# Patient Record
Sex: MALE | Race: WHITE | NOT HISPANIC OR LATINO | Employment: OTHER | URBAN - METROPOLITAN AREA
[De-identification: names, ages, dates, MRNs, and addresses within clinical notes are randomized per-mention and may not be internally consistent; named-entity substitution may affect disease eponyms.]

---

## 2019-10-16 ENCOUNTER — OFFICE VISIT (OUTPATIENT)
Dept: NEUROLOGY | Facility: CLINIC | Age: 49
End: 2019-10-16

## 2019-10-16 VITALS
DIASTOLIC BLOOD PRESSURE: 80 MMHG | SYSTOLIC BLOOD PRESSURE: 110 MMHG | HEIGHT: 64 IN | WEIGHT: 145 LBS | BODY MASS INDEX: 24.75 KG/M2 | OXYGEN SATURATION: 100 % | HEART RATE: 84 BPM

## 2019-10-16 DIAGNOSIS — Z46.2 ENCOUNTER FOR INTERROGATION OF NEUROSTIMULATOR: ICD-10-CM

## 2019-10-16 DIAGNOSIS — G40.119 PARTIAL SYMPTOMATIC EPILEPSY WITH SIMPLE PARTIAL SEIZURES, INTRACTABLE, WITHOUT STATUS EPILEPTICUS (HCC): Primary | ICD-10-CM

## 2019-10-16 PROBLEM — G40.909 EPILEPSY: Status: ACTIVE | Noted: 2019-10-16

## 2019-10-16 PROCEDURE — 95977 ALYS CPLX CN NPGT PRGRMG: CPT | Performed by: PSYCHIATRY & NEUROLOGY

## 2019-10-16 PROCEDURE — 99204 OFFICE O/P NEW MOD 45 MIN: CPT | Performed by: PSYCHIATRY & NEUROLOGY

## 2019-10-16 RX ORDER — MONTELUKAST SODIUM 10 MG/1
10 TABLET ORAL NIGHTLY
COMMUNITY
End: 2020-06-22 | Stop reason: SDUPTHER

## 2019-10-16 RX ORDER — LEVOCETIRIZINE DIHYDROCHLORIDE 5 MG/1
5 TABLET, FILM COATED ORAL EVERY EVENING
COMMUNITY
End: 2020-02-20 | Stop reason: SDUPTHER

## 2019-10-16 RX ORDER — ZONISAMIDE 100 MG/1
100 CAPSULE ORAL DAILY
COMMUNITY
End: 2020-05-05 | Stop reason: SDUPTHER

## 2019-10-16 RX ORDER — LAMOTRIGINE 100 MG/1
100 TABLET ORAL DAILY
COMMUNITY
End: 2020-03-19 | Stop reason: DRUGHIGH

## 2019-10-16 RX ORDER — LORAZEPAM 1 MG/1
1 TABLET ORAL EVERY 8 HOURS PRN
COMMUNITY
End: 2020-02-20 | Stop reason: SDUPTHER

## 2019-10-16 RX ORDER — PHENYTOIN SODIUM 100 MG/1
CAPSULE, EXTENDED RELEASE ORAL 2 TIMES DAILY
COMMUNITY
End: 2019-11-08

## 2019-10-16 NOTE — PROGRESS NOTES
Subjective:     Patient ID: Sreedhar Raines is a 49 y.o. male.    Kalpesh is a 49-year-old male with a history of allergies, developmental delay, epilepsy, and cerebral palsy who presents to the neurology clinic today as a new patient for the evaluation of seizures.  The patient has a vagus nerve stimulator.  I reviewed the patient's records.  I reviewed a neurology note from February 7, 2018.  He reports the patient has a history of hemiplegia.  His seizures went from 3/week to 1 every 3 to 4 weeks.  This was improved with zonisamide.  He is also on name brand only Dilantin and Lamictal.  He had an EEG done in 2017 that showed left disorganized cortical activity.  He has allergies to Depakote and Tegretol.  Is in this in mind was increased at the last visit.  The patient was seen by Dr. Morfin.  There is another note from May 2018.  There was not much change in his seizure frequency with the increase in the sonogram.  The plan was to refer the patient for a VNS.  He was last seen in March 2019.  No changes to his medications were made.  I reviewed the patient's labs.  On February 7, 2018 his Dilantin level was 22.2 I reviewed a report from an MRI of the brain from 2017.  It showed severe volume loss throughout the entire left cerebral hemisphere with associated ex vacuo dilatation Tatian of the atrium and occipital horn of the left lateral ventricle.  He was stable from 2013.  Cortical loss and gliosis superiorly within the right frontal lobe consistent with nonspecific insult.  Increased signal within the left mesial temporal region raising suspicion of mesial temporal sclerosis.    The patient is accompanied by his mother today who is also his legal guardian.  She reports that her delivery was complicated.  Her  was in the  and they were in [place] at the time.  She carried the patient for 10 months.  She had prolonged labor and apparently he got stuck and had a traumatic forceps removal.  He had his  first seizure 6 hours after delivery and was in a coma.  He is essentially in the NICU for about 6 days.  He was initially on phenobarbital.  At 14 days he was then flown back to the [place].  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  At the age of 7 he had status epilepticus and Dilantin was added.  His VNS was placed last year and has been very helpful.  He was seizure-free for 3 months and this is the longest he is ever been seizure-free.  His last seizure was a week ago.  The magnet swipes generally abort the seizure.  His last VNS change was in April.  He last got blood work in April.  He generally has seizures between 3 and 8 PM.  He does have some voice change with the VNS.  He is on name brand only Dilantin 300 mg at night.  He had more seizures with the generic.  He has got toxic on this in the past.  He has been on this medication since he was 7.  He is on lamotrigine 200 mg twice a day for the past 3 years and zonisamide 100 mg twice a day was added a year and half ago.  They deny any side effects to these medications.  He was taking over-the-counter CBD oil but not since the VNS was planted.  He also uses Ativan as needed for agitation.  They moved from [place] to [place].  He had hyperammonemia to Depakote, mouth blisters with carbamazepine, and continued seizures with levetiracetam and topiramate.  He usually feels his seizures coming on.  He will have focal motor seizures with right arm shaking lasting about 5 to 10 seconds.  The last one was about 90 seconds.  Sometimes he has difficulty breathing.  This may progress into a generalized tonic-clonic seizures.  They can be triggered by flashing lights.  3 times in his life he is had seizure clusters but the last time was 11 years ago.  Afterwards it takes him a while to perk up.  He apparently had a DEXA scan done 2 years ago that was normal but previously it showed osteoporosis and he was treated with Reclast.  He is also  on calcium with vitamin D.  He last had blood work done in April for drug monitoring.      The following portions of the patient's history were reviewed and updated as appropriate: allergies, current medications, past family history, past medical history, past social history, past surgical history and problem list.    Review of Systems   Eyes: Positive for photophobia.   Endocrine: Positive for heat intolerance.   Musculoskeletal: Positive for gait problem.   Allergic/Immunologic: Positive for environmental allergies.   Neurological: Positive for seizures, speech difficulty and weakness. Negative for dizziness, tremors, syncope, facial asymmetry, light-headedness, numbness and headaches.   Hematological: Does not bruise/bleed easily.   Psychiatric/Behavioral: Positive for agitation and behavioral problems. Negative for confusion, decreased concentration, dysphoric mood, hallucinations, self-injury, sleep disturbance and suicidal ideas. The patient is not nervous/anxious and is not hyperactive.     I reviewed the ROS documented by the MA.  All other systems negative.      Objective:    Neurologic Exam    Physical Exam   Constitutional:  Vital signs reviewed.  No apparent distress.  Well groomed.  Eyes:  No injection, no icterus.    Respiratory:  Normal effort.  Clear to auscultation bilaterally.  Cardiovascular:  Regular rate and rhythm.  No murmurs.  No carotid bruits. Symmetric radial pulses.  Musculoskeletal: Normal station.  Gait steady.  The patient has increased tone in his right upper extremity as well as decreased bulk.  Tone and bulk is normal in the left upper extremity and bilateral lower extremities.  There is a mild right hemiparesis in the right upper extremity more than the right lower extremity.  Skin:  No rashes.  Warm, dry, and intact.  Psychiatric:  Good mood.  Normal affect.    Neurologic:  Mental status-  The patient is alert and oriented to person only. Attention/concentration is decreased.   Speech is fluent without dysarthria.    Cranial nerves- Pupils equally round and reactive to light with intact accomodation.   Extraocular movements intact.  Facial sensation intact.  Smile symmetric.  Hearing intact to finger-rub bilaterally.  Palate elevates symmetrically.  SCM and trapezius are 5/5 bilaterally.  Tongue is midline.  Motor-  See musculoskeletal above.  No tremor.  Reflexes-reflexes are brisker in the right upper extremity compared to the left.  2+ in the bilateral lower extremities proximally and distally.  Sensation- Intact to light touch in the bilateral upper extremities.  Coordination- Intact to finger to nose and heel knee shin bilaterally.   Gait- See musculoskeletal exam above.     The patient's VNS was interrogated today.  Programming changes were made.  A total of 5 changes were made.  Below are his current settings.  He tolerated the procedure without any adverse side effects.          Assessment/Plan:  Kalpesh is a 49-year-old male with a history of allergies, developmental delay, cerebral palsy, and intractable epilepsy status post VNS who presents to the neurology clinic today as a new patient for evaluation.    1.  Intractable epilepsy-The patient likely has structural epilepsy with focal aware seizures with motor onset (clonic movements of the right upper extremity).  Sometimes these progressed to bilateral tonic-clonic seizures.  He has gotten a lot of benefit from his VNS.  I increased his duty cycle both during the daytime as well as during his nighttime settings.  I also lowered the auto stim threshold.  In the future, we can consider increasing the frequency and pulse width however this may have more impact on his voice.  I will try to request the records from his most recent blood work from drug monitoring.  I will continue his current medications with no changes.  In the future we may consider weaning off the phenytoin.  If that is done we may need to increase the zonisamide or  may be replacing phenytoin with Vimpat.     Problems Addressed this Visit        Nervous and Auditory    Epilepsy (CMS/HCC) - Primary    Relevant Medications    phenytoin (DILANTIN) 100 MG ER capsule    zonisamide (ZONEGRAN) 100 MG capsule    lamoTRIgine (LaMICtal) 100 MG tablet       Other    Encounter for interrogation of neurostimulator

## 2019-10-16 NOTE — PATIENT INSTRUCTIONS
[facility]  Payton Harrington MD  Neurology clinic  [phone]    With anti-seizure medications, you may initially notice side effects of fatigue, drowsiness, unsteadiness, and dizziness.  Other possible side effects include nausea, abdominal pain, headache, blurry or double vision, slurred speech and mood changes.  Generally, patients will noticed these symptoms when the medication is first started or with higher doses and will go away with time.    It is import to consistently take your medication every day.  Missing just one dose may put you at risk for a breakthrough seizure.  Consider using reminders on your phone or a pill box.    If you develop a rash, please call the neurology clinic immediately or notify another healthcare professional, as this may be potentially life-threatening.  If you are unable to reach a healthcare professional, go to the emergency room immediately for further evaluation.    If you develop thoughts of wanting to hurt yourself or others, please call the neurology clinic immediately to notify another healthcare professional.  If you are unable to reach a healthcare professional, go to the emergency room immediately for further evaluation.    Taking anti-seizure medications may increase the risk of birth defects.  If you are a female of child-bearing potential, it is recommended that you take folic acid (1-4 mg) daily.  This may reduce the risk of birth defects while pregnant and taking seizure medication.  If you become pregnant, contact our office immediately. You will need to be followed very closely (at least monthly appointments).  I also recommend contacting The North American Antiepileptic Drug Pregnancy Registry at www.aedpregnancyregistry.org or 1-148.549.8592.    It is the [place] state law that you cannot drive within 90 days of a seizure.    You should avoid certain activities that if you were to have a seizure, you could harm yourself or others. In  general, it is recommended that you avoid operating heavy machinery or power tools, swimming or taking baths by yourself (showers are ok), don't stand over open flames, don't get on high ladders or the roof.  I also recommend to avoid sleeping on your stomach.    For further information on epilepsy and resources available to patients and their families, please visit the [facility] at www.efky.org or call [phone].

## 2019-11-01 ENCOUNTER — TELEPHONE (OUTPATIENT)
Dept: NEUROLOGY | Facility: CLINIC | Age: 49
End: 2019-11-01

## 2019-11-01 NOTE — TELEPHONE ENCOUNTER
----- Message from Payton Harrington MD sent at 10/16/2019  5:55 PM EDT -----  Regarding: Labs  This patient reports that he had blood work done in April of last year by the referring provider.  Can we try to get those results?  Thanks!

## 2019-11-08 ENCOUNTER — TELEPHONE (OUTPATIENT)
Dept: NEUROLOGY | Facility: CLINIC | Age: 49
End: 2019-11-08

## 2019-11-08 RX ORDER — PHENYTOIN SODIUM 100 MG/1
100 CAPSULE, EXTENDED RELEASE ORAL 2 TIMES DAILY
Qty: 90 CAPSULE | Refills: 11 | OUTPATIENT
Start: 2019-11-08

## 2019-11-08 NOTE — TELEPHONE ENCOUNTER
Please call this medication in.  I want us to make sure that we refill the correct formulation of the drug.  I'm not sure if he is on ER.

## 2019-11-08 NOTE — TELEPHONE ENCOUNTER
----- Message from Viviana Washington sent at 11/7/2019 12:14 PM EST -----  Contact: Liudmila  Mother called requesting a refill of patients Dialantin. Patient uses [facility] in [place].  [phone] is the Store number, mom did not have the pharmacy direct number. PLEASE ADVISE PATIENT HAS ONLY ENOUGH UNTIL Saturday EVENING MOM ASKS WE CALL IN THE SCRIPT ASAP AS HE WILL HAVE NOTHING FOR Sunday. Thank you.

## 2019-11-08 NOTE — TELEPHONE ENCOUNTER
Patient is on name brand only (medically necessary) Dilantin 100 mg, sig: 3 tabs a day, #90, refills 11.  Can you call in?

## 2019-12-30 ENCOUNTER — TELEPHONE (OUTPATIENT)
Dept: NEUROLOGY | Facility: CLINIC | Age: 49
End: 2019-12-30

## 2019-12-30 ENCOUNTER — APPOINTMENT (OUTPATIENT)
Dept: LAB | Facility: HOSPITAL | Age: 49
End: 2019-12-30

## 2019-12-30 DIAGNOSIS — R56.9 GENERALIZED CONVULSIVE SEIZURES (HCC): Primary | ICD-10-CM

## 2019-12-30 PROCEDURE — 80186 ASSAY OF PHENYTOIN FREE: CPT | Performed by: PSYCHIATRY & NEUROLOGY

## 2019-12-30 PROCEDURE — 80185 ASSAY OF PHENYTOIN TOTAL: CPT | Performed by: PSYCHIATRY & NEUROLOGY

## 2019-12-30 PROCEDURE — 36415 COLL VENOUS BLD VENIPUNCTURE: CPT

## 2019-12-30 NOTE — TELEPHONE ENCOUNTER
Liudmila will take him to the lab to get his Dilantin Level checked. I informed her that the best way to do this is an hour before his dose. She stated understanding.

## 2019-12-30 NOTE — TELEPHONE ENCOUNTER
Liudmila (mom) was calling because around 11 the patient balance seemed to be off. She is wondering if it can be from his Dilantin. Please advise.

## 2019-12-30 NOTE — TELEPHONE ENCOUNTER
If she thinks that he is a fall risk, I recommend going to the ER.  We can check a Dilantin level.  The best way to do this is to get it drawn an hour before the dose.  I can place the orders in and they can have it done wherever is convenient.

## 2020-01-02 ENCOUNTER — TELEPHONE (OUTPATIENT)
Dept: NEUROLOGY | Facility: CLINIC | Age: 50
End: 2020-01-02

## 2020-01-02 LAB
PHENYTOIN FREE SERPL-MCNC: 1.6 UG/ML (ref 1–2)
PHENYTOIN SERPL-MCNC: 27.7 UG/ML (ref 10–20)

## 2020-01-02 NOTE — TELEPHONE ENCOUNTER
Can you let this patient's mom know that we got the results from his Dilantin level and it looks okay?  I want to make sure that he got the level drawn an hour before the dose.  Can we also confirm with them what he takes exactly?  I would be hesitant to make any changes to his dosing if he has been on a stable dosing for a long time.  I am not sure why his medication would all of a sudden be causing side effects.

## 2020-01-02 NOTE — TELEPHONE ENCOUNTER
Informed patients mom of lab results and she stated understanding. She stated that he did have the labs drown an hour before the dose. He is currently on 100 mg tablets, he takes 3 tablets at 5:00 PM Daily.  She did state that he is feeling better, still just a little wobbly.

## 2020-01-16 ENCOUNTER — OFFICE VISIT (OUTPATIENT)
Dept: NEUROLOGY | Facility: CLINIC | Age: 50
End: 2020-01-16

## 2020-01-16 VITALS
HEART RATE: 83 BPM | DIASTOLIC BLOOD PRESSURE: 70 MMHG | SYSTOLIC BLOOD PRESSURE: 110 MMHG | BODY MASS INDEX: 22.88 KG/M2 | WEIGHT: 134 LBS | HEIGHT: 64 IN | OXYGEN SATURATION: 98 %

## 2020-01-16 DIAGNOSIS — G40.119 PARTIAL SYMPTOMATIC EPILEPSY WITH SIMPLE PARTIAL SEIZURES, INTRACTABLE, WITHOUT STATUS EPILEPTICUS (HCC): Primary | ICD-10-CM

## 2020-01-16 DIAGNOSIS — Z46.2 ENCOUNTER FOR INTERROGATION OF NEUROSTIMULATOR: ICD-10-CM

## 2020-01-16 PROCEDURE — 95970 ALYS NPGT W/O PRGRMG: CPT | Performed by: PSYCHIATRY & NEUROLOGY

## 2020-01-16 PROCEDURE — 99214 OFFICE O/P EST MOD 30 MIN: CPT | Performed by: PSYCHIATRY & NEUROLOGY

## 2020-01-16 RX ORDER — PHENYTOIN SODIUM 100 MG/1
200 CAPSULE, EXTENDED RELEASE ORAL DAILY
COMMUNITY
Start: 2020-01-06 | End: 2020-11-30

## 2020-01-16 RX ORDER — PHENYTOIN 50 MG/1
50 TABLET, CHEWABLE ORAL DAILY
Qty: 30 TABLET | Refills: 11 | Status: SHIPPED | OUTPATIENT
Start: 2020-01-16 | End: 2020-02-21

## 2020-01-16 NOTE — PROGRESS NOTES
Subjective:     Patient ID: Sreedhar Raines is a 49 y.o. male.    Kalpesh is a 49-year-old male with a history of allergies, developmental delay, epilepsy, and cerebral palsy who presents to the neurology clinic today as an established patient for the evaluation of seizures.  The patient was last seen as a new patient on December 16, 2019.  The patient is accompanied by his mother which is his legal guardian.  They live together in [place].  She had a complicated delivery where she carried the patient for 10 months.  Labor was prolonged and he had a traumatic removal with forceps.  His first seizure was 6 hours after delivery and he was in a coma.  He was in the NICU for about a week.  Initially was on phenobarbital.  He would have eye twitching seizures when he was younger and then at age 7 he had status epilepticus.  After VNS was placed he went seizure-free for 3 months which is the longest he had ever been seizure-free.  The magnet swipes generally abort the seizure.  He generally has seizures between 3 and 8 PM.  He has had some hoarseness with the VNS.  He takes name brand only Dilantin 300 mg at 5 PM.  He also takes lamotrigine 200 mg twice a day and zonisamide 100 mg twice a day.  He also uses Ativan as needed for agitation.  Mom uses it once or twice a day.  Rarely 3 times.  He had hyperammonemia to Depakote in the past, mouth blisters with carbamazepine and he continued to have seizures with Keppra and Topamax.  He will have focal motor seizures involving his right arm lasting 5 to 10 seconds.  These may progress to generalized tonic-clonic seizures.  They may be triggered by flashing lights.  3 times in his life he had seizure clusters but this has not occurred in 11 years.  He had a DEXA scan 2 years ago that was okay but he previously had one that showed osteoporosis and he was treated.  Since our last visit the patient has had some intermittent unsteadiness.  It started 2 weeks ago.  The last time was  Tuesday.  He will look like he is weak in the knees and have to hold onto the walls.  The first time it happened it lasted all day but since then has been intermittent lasting just moments.  It is occurred 4 times.  It is generally upon standing.  Since his last visit he felt one seizure, on but it aborted prior to the magnet swipe.  We checked a Dilantin level on December 30 his total level was 27.7 his free level is 1.6.  He had a level done on February 7, 2018 that was 22.2.    The following portions of the patient's history were reviewed and updated as appropriate: allergies, current medications, past family history, past medical history, past social history, past surgical history and problem list.    Review of Systems     Objective:    Neurologic Exam    Physical Exam   The patient's VNS was interrogated.  No changes were made.  He tolerated the procedure well without any adverse side effects.  Below are his current settings.        Assessment/Plan:  Kalpesh is a 41-year-old male with history of allergies, developmental delay, intractable epilepsy status post VNS and cerebral palsy who presents to the neurology clinic today for follow-up.    1.  Intractable epilepsy- fortunately he had one small seizure since his last visit that aborted before he could even swiped his magnet.  Due to these episodes of unsteadiness is possibly due to Dilantin toxicity.  I do recommend decreasing his dose to 250 mg daily.  We will continue lamotrigine as an SMI.  I do not recommend changing his VNS at this time.  A total of 25 minutes of face-to-face time was spent with the patient and his mother and greater than 50% that time was spent on counseling regarding his symptoms and medication management.  If he does have any further seizures, we can repeat a level at that time.  I am happy to continue to prescribe Ativan as needed for agitation.  He has been on stable doses for 8 years and it seems to work well.  If it stops working  well for him then we may consider referral to psychiatry.     Problems Addressed this Visit        Nervous and Auditory    Epilepsy (CMS/HCC) - Primary    Relevant Medications    DILANTIN 100 MG ER capsule    phenytoin (DILANTIN INFATABS) 50 MG chewable tablet       Other    Encounter for interrogation of neurostimulator

## 2020-02-20 ENCOUNTER — OFFICE VISIT (OUTPATIENT)
Dept: FAMILY MEDICINE CLINIC | Facility: CLINIC | Age: 50
End: 2020-02-20

## 2020-02-20 VITALS
SYSTOLIC BLOOD PRESSURE: 116 MMHG | TEMPERATURE: 98.8 F | WEIGHT: 137.4 LBS | OXYGEN SATURATION: 99 % | HEIGHT: 65 IN | HEART RATE: 83 BPM | BODY MASS INDEX: 22.89 KG/M2 | DIASTOLIC BLOOD PRESSURE: 75 MMHG

## 2020-02-20 DIAGNOSIS — G40.119 PARTIAL SYMPTOMATIC EPILEPSY WITH SIMPLE PARTIAL SEIZURES, INTRACTABLE, WITHOUT STATUS EPILEPTICUS (HCC): ICD-10-CM

## 2020-02-20 DIAGNOSIS — J30.89 ENVIRONMENTAL AND SEASONAL ALLERGIES: ICD-10-CM

## 2020-02-20 DIAGNOSIS — R45.1 AGITATION: ICD-10-CM

## 2020-02-20 DIAGNOSIS — F39 MOOD DISORDER (HCC): ICD-10-CM

## 2020-02-20 DIAGNOSIS — G80.2 SPASTIC HEMIPLEGIC CEREBRAL PALSY (HCC): Primary | Chronic | ICD-10-CM

## 2020-02-20 PROCEDURE — 99204 OFFICE O/P NEW MOD 45 MIN: CPT | Performed by: FAMILY MEDICINE

## 2020-02-20 RX ORDER — LEVOCETIRIZINE DIHYDROCHLORIDE 5 MG/1
5 TABLET, FILM COATED ORAL EVERY EVENING
Qty: 30 TABLET | Refills: 5 | Status: SHIPPED | OUTPATIENT
Start: 2020-02-20 | End: 2020-02-21

## 2020-02-20 RX ORDER — LORAZEPAM 1 MG/1
1 TABLET ORAL EVERY 8 HOURS PRN
Qty: 86 TABLET | Refills: 0 | Status: SHIPPED | OUTPATIENT
Start: 2020-02-20 | End: 2020-07-21

## 2020-02-20 RX ORDER — RISPERIDONE 0.5 MG/1
0.5 TABLET ORAL NIGHTLY
Qty: 30 TABLET | Refills: 1 | Status: SHIPPED | OUTPATIENT
Start: 2020-02-20 | End: 2020-04-13

## 2020-02-21 RX ORDER — LEVOCETIRIZINE DIHYDROCHLORIDE 5 MG/1
5 TABLET, FILM COATED ORAL EVERY EVENING
Qty: 90 TABLET | Refills: 1 | Status: SHIPPED | OUTPATIENT
Start: 2020-02-21 | End: 2020-08-31

## 2020-02-21 RX ORDER — PHENYTOIN 50 MG/1
TABLET, CHEWABLE ORAL
Qty: 90 TABLET | Refills: 10 | Status: SHIPPED | OUTPATIENT
Start: 2020-02-21 | End: 2021-01-13

## 2020-03-16 ENCOUNTER — TELEPHONE (OUTPATIENT)
Dept: NEUROLOGY | Facility: CLINIC | Age: 50
End: 2020-03-16

## 2020-03-16 RX ORDER — LAMOTRIGINE 100 MG/1
TABLET ORAL
Qty: 450 TABLET | Refills: 3 | Status: SHIPPED | OUTPATIENT
Start: 2020-03-16 | End: 2021-02-24 | Stop reason: SDUPTHER

## 2020-03-19 ENCOUNTER — OFFICE VISIT (OUTPATIENT)
Dept: FAMILY MEDICINE CLINIC | Facility: CLINIC | Age: 50
End: 2020-03-19

## 2020-03-19 VITALS
BODY MASS INDEX: 22.16 KG/M2 | HEART RATE: 73 BPM | DIASTOLIC BLOOD PRESSURE: 74 MMHG | HEIGHT: 65 IN | SYSTOLIC BLOOD PRESSURE: 115 MMHG | OXYGEN SATURATION: 99 % | TEMPERATURE: 98.7 F | WEIGHT: 133 LBS

## 2020-03-19 DIAGNOSIS — Z11.59 NEED FOR HEPATITIS C SCREENING TEST: ICD-10-CM

## 2020-03-19 DIAGNOSIS — R63.4 UNINTENTIONAL WEIGHT LOSS: ICD-10-CM

## 2020-03-19 DIAGNOSIS — G80.2 SPASTIC HEMIPLEGIC CEREBRAL PALSY (HCC): Chronic | ICD-10-CM

## 2020-03-19 DIAGNOSIS — G40.119 PARTIAL SYMPTOMATIC EPILEPSY WITH SIMPLE PARTIAL SEIZURES, INTRACTABLE, WITHOUT STATUS EPILEPTICUS (HCC): Chronic | ICD-10-CM

## 2020-03-19 DIAGNOSIS — F39 MOOD DISORDER (HCC): Primary | ICD-10-CM

## 2020-03-19 DIAGNOSIS — H61.21 IMPACTED CERUMEN OF RIGHT EAR: ICD-10-CM

## 2020-03-19 PROCEDURE — 69210 REMOVE IMPACTED EAR WAX UNI: CPT | Performed by: FAMILY MEDICINE

## 2020-03-19 PROCEDURE — 99214 OFFICE O/P EST MOD 30 MIN: CPT | Performed by: FAMILY MEDICINE

## 2020-03-24 LAB
ALBUMIN SERPL-MCNC: 4.4 G/DL (ref 3.5–5.2)
ALBUMIN/GLOB SERPL: 1.8 G/DL
ALP SERPL-CCNC: 69 U/L (ref 39–117)
ALT SERPL W P-5'-P-CCNC: 13 U/L (ref 1–41)
ANION GAP SERPL CALCULATED.3IONS-SCNC: 11.3 MMOL/L (ref 5–15)
AST SERPL-CCNC: 11 U/L (ref 1–40)
BASOPHILS # BLD AUTO: 0.02 10*3/MM3 (ref 0–0.2)
BASOPHILS NFR BLD AUTO: 0.4 % (ref 0–1.5)
BILIRUB SERPL-MCNC: <0.2 MG/DL (ref 0.2–1.2)
BUN BLD-MCNC: 11 MG/DL (ref 6–20)
BUN/CREAT SERPL: 11 (ref 7–25)
CALCIUM SPEC-SCNC: 9.3 MG/DL (ref 8.6–10.5)
CHLORIDE SERPL-SCNC: 104 MMOL/L (ref 98–107)
CO2 SERPL-SCNC: 24.7 MMOL/L (ref 22–29)
CREAT BLD-MCNC: 1 MG/DL (ref 0.76–1.27)
DEPRECATED RDW RBC AUTO: 40.3 FL (ref 37–54)
EOSINOPHIL # BLD AUTO: 0.15 10*3/MM3 (ref 0–0.4)
EOSINOPHIL NFR BLD AUTO: 3.4 % (ref 0.3–6.2)
ERYTHROCYTE [DISTWIDTH] IN BLOOD BY AUTOMATED COUNT: 12.1 % (ref 12.3–15.4)
GFR SERPL CREATININE-BSD FRML MDRD: 80 ML/MIN/1.73
GLOBULIN UR ELPH-MCNC: 2.5 GM/DL
GLUCOSE BLD-MCNC: 83 MG/DL (ref 65–99)
HCT VFR BLD AUTO: 38.2 % (ref 37.5–51)
HCV AB SER DONR QL: NORMAL
HGB BLD-MCNC: 13.3 G/DL (ref 13–17.7)
IMM GRANULOCYTES # BLD AUTO: 0 10*3/MM3 (ref 0–0.05)
IMM GRANULOCYTES NFR BLD AUTO: 0 % (ref 0–0.5)
LYMPHOCYTES # BLD AUTO: 2.15 10*3/MM3 (ref 0.7–3.1)
LYMPHOCYTES NFR BLD AUTO: 48.2 % (ref 19.6–45.3)
MCH RBC QN AUTO: 32 PG (ref 26.6–33)
MCHC RBC AUTO-ENTMCNC: 34.8 G/DL (ref 31.5–35.7)
MCV RBC AUTO: 92 FL (ref 79–97)
MONOCYTES # BLD AUTO: 0.52 10*3/MM3 (ref 0.1–0.9)
MONOCYTES NFR BLD AUTO: 11.7 % (ref 5–12)
NEUTROPHILS # BLD AUTO: 1.62 10*3/MM3 (ref 1.7–7)
NEUTROPHILS NFR BLD AUTO: 36.3 % (ref 42.7–76)
NRBC BLD AUTO-RTO: 0 /100 WBC (ref 0–0.2)
PHENYTOIN SERPL-MCNC: 16.9 MCG/ML (ref 10–20)
PLATELET # BLD AUTO: 202 10*3/MM3 (ref 140–450)
PMV BLD AUTO: 10 FL (ref 6–12)
POTASSIUM BLD-SCNC: 3.8 MMOL/L (ref 3.5–5.2)
PROT SERPL-MCNC: 6.9 G/DL (ref 6–8.5)
RBC # BLD AUTO: 4.15 10*6/MM3 (ref 4.14–5.8)
SODIUM BLD-SCNC: 140 MMOL/L (ref 136–145)
TSH SERPL DL<=0.05 MIU/L-ACNC: 2.58 UIU/ML (ref 0.27–4.2)
WBC NRBC COR # BLD: 4.46 10*3/MM3 (ref 3.4–10.8)

## 2020-03-24 PROCEDURE — 80186 ASSAY OF PHENYTOIN FREE: CPT | Performed by: FAMILY MEDICINE

## 2020-03-24 PROCEDURE — 36415 COLL VENOUS BLD VENIPUNCTURE: CPT | Performed by: FAMILY MEDICINE

## 2020-03-24 PROCEDURE — 84305 ASSAY OF SOMATOMEDIN: CPT | Performed by: FAMILY MEDICINE

## 2020-03-24 PROCEDURE — 85025 COMPLETE CBC W/AUTO DIFF WBC: CPT | Performed by: FAMILY MEDICINE

## 2020-03-24 PROCEDURE — 86803 HEPATITIS C AB TEST: CPT | Performed by: FAMILY MEDICINE

## 2020-03-24 PROCEDURE — 80053 COMPREHEN METABOLIC PANEL: CPT | Performed by: FAMILY MEDICINE

## 2020-03-24 PROCEDURE — 84443 ASSAY THYROID STIM HORMONE: CPT | Performed by: FAMILY MEDICINE

## 2020-03-24 PROCEDURE — 80185 ASSAY OF PHENYTOIN TOTAL: CPT | Performed by: FAMILY MEDICINE

## 2020-03-25 LAB — IGF-I SERPL-MCNC: 111 NG/ML (ref 67–205)

## 2020-03-31 LAB — PHENYTOIN FREE SERPL-MCNC: 1.1 UG/ML (ref 1–2)

## 2020-04-12 DIAGNOSIS — F39 MOOD DISORDER (HCC): ICD-10-CM

## 2020-04-12 DIAGNOSIS — R45.1 AGITATION: ICD-10-CM

## 2020-04-13 RX ORDER — RISPERIDONE 0.5 MG/1
0.5 TABLET ORAL NIGHTLY
Qty: 30 TABLET | Refills: 5 | Status: SHIPPED | OUTPATIENT
Start: 2020-04-13 | End: 2020-09-30

## 2020-05-05 RX ORDER — ZONISAMIDE 100 MG/1
CAPSULE ORAL
Qty: 180 CAPSULE | Refills: 0 | Status: SHIPPED | OUTPATIENT
Start: 2020-05-05 | End: 2020-08-03

## 2020-05-05 RX ORDER — ZONISAMIDE 100 MG/1
100 CAPSULE ORAL 2 TIMES DAILY
Qty: 60 CAPSULE | Refills: 3 | Status: SHIPPED | OUTPATIENT
Start: 2020-05-05 | End: 2020-05-05

## 2020-06-22 ENCOUNTER — LAB (OUTPATIENT)
Dept: FAMILY MEDICINE CLINIC | Facility: CLINIC | Age: 50
End: 2020-06-22

## 2020-06-22 ENCOUNTER — OFFICE VISIT (OUTPATIENT)
Dept: FAMILY MEDICINE CLINIC | Facility: CLINIC | Age: 50
End: 2020-06-22

## 2020-06-22 VITALS
HEART RATE: 71 BPM | SYSTOLIC BLOOD PRESSURE: 128 MMHG | DIASTOLIC BLOOD PRESSURE: 85 MMHG | WEIGHT: 136 LBS | BODY MASS INDEX: 22.66 KG/M2 | TEMPERATURE: 98.6 F | OXYGEN SATURATION: 100 % | HEIGHT: 65 IN

## 2020-06-22 DIAGNOSIS — R41.89 IMPAIRMENT OF COGNITIVE FUNCTION: ICD-10-CM

## 2020-06-22 DIAGNOSIS — J30.89 ENVIRONMENTAL AND SEASONAL ALLERGIES: ICD-10-CM

## 2020-06-22 DIAGNOSIS — Z00.00 ENCOUNTER FOR MEDICARE ANNUAL WELLNESS EXAM: ICD-10-CM

## 2020-06-22 DIAGNOSIS — G40.119 PARTIAL SYMPTOMATIC EPILEPSY WITH SIMPLE PARTIAL SEIZURES, INTRACTABLE, WITHOUT STATUS EPILEPTICUS (HCC): ICD-10-CM

## 2020-06-22 DIAGNOSIS — G80.2 SPASTIC HEMIPLEGIC CEREBRAL PALSY (HCC): Primary | ICD-10-CM

## 2020-06-22 DIAGNOSIS — Z13.6 ENCOUNTER FOR SCREENING FOR CARDIOVASCULAR DISORDERS: ICD-10-CM

## 2020-06-22 DIAGNOSIS — F39 MOOD DISORDER (HCC): ICD-10-CM

## 2020-06-22 PROCEDURE — G0439 PPPS, SUBSEQ VISIT: HCPCS | Performed by: FAMILY MEDICINE

## 2020-06-22 PROCEDURE — 80061 LIPID PANEL: CPT | Performed by: FAMILY MEDICINE

## 2020-06-22 PROCEDURE — 36415 COLL VENOUS BLD VENIPUNCTURE: CPT | Performed by: FAMILY MEDICINE

## 2020-06-22 RX ORDER — MONTELUKAST SODIUM 10 MG/1
10 TABLET ORAL NIGHTLY
Qty: 90 TABLET | Refills: 1 | Status: SHIPPED | OUTPATIENT
Start: 2020-06-22 | End: 2020-12-28

## 2020-06-22 NOTE — PATIENT INSTRUCTIONS
Preventive Care 40-64 Years Old, Male  Preventive care refers to lifestyle choices and visits with your health care provider that can promote health and wellness. This includes:  · A yearly physical exam. This is also called an annual well check.  · Regular dental and eye exams.  · Immunizations.  · Screening for certain conditions.  · Healthy lifestyle choices, such as eating a healthy diet, getting regular exercise, not using drugs or products that contain nicotine and tobacco, and limiting alcohol use.  What can I expect for my preventive care visit?  Physical exam  Your health care provider will check:  · Height and weight. These may be used to calculate body mass index (BMI), which is a measurement that tells if you are at a healthy weight.  · Heart rate and blood pressure.  · Your skin for abnormal spots.  Counseling  Your health care provider may ask you questions about:  · Alcohol, tobacco, and drug use.  · Emotional well-being.  · Home and relationship well-being.  · Sexual activity.  · Eating habits.  · Work and work environment.  What immunizations do I need?    Influenza (flu) vaccine  · This is recommended every year.  Tetanus, diphtheria, and pertussis (Tdap) vaccine  · You may need a Td booster every 10 years.  Varicella (chickenpox) vaccine  · You may need this vaccine if you have not already been vaccinated.  Zoster (shingles) vaccine  · You may need this after age 60.  Measles, mumps, and rubella (MMR) vaccine  · You may need at least one dose of MMR if you were born in 1957 or later. You may also need a second dose.  Pneumococcal conjugate (PCV13) vaccine  · You may need this if you have certain conditions and were not previously vaccinated.  Pneumococcal polysaccharide (PPSV23) vaccine  · You may need one or two doses if you smoke cigarettes or if you have certain conditions.  Meningococcal conjugate (MenACWY) vaccine  · You may need this if you have certain conditions.  Hepatitis A  vaccine  · You may need this if you have certain conditions or if you travel or work in places where you may be exposed to hepatitis A.  Hepatitis B vaccine  · You may need this if you have certain conditions or if you travel or work in places where you may be exposed to hepatitis B.  Haemophilus influenzae type b (Hib) vaccine  · You may need this if you have certain risk factors.  Human papillomavirus (HPV) vaccine  · If recommended by your health care provider, you may need three doses over 6 months.  You may receive vaccines as individual doses or as more than one vaccine together in one shot (combination vaccines). Talk with your health care provider about the risks and benefits of combination vaccines.  What tests do I need?  Blood tests  · Lipid and cholesterol levels. These may be checked every 5 years, or more frequently if you are over 50 years old.  · Hepatitis C test.  · Hepatitis B test.  Screening  · Lung cancer screening. You may have this screening every year starting at age 55 if you have a 30-pack-year history of smoking and currently smoke or have quit within the past 15 years.  · Prostate cancer screening. Recommendations will vary depending on your family history and other risks.  · Colorectal cancer screening. All adults should have this screening starting at age 50 and continuing until age 75. Your health care provider may recommend screening at age 45 if you are at increased risk. You will have tests every 1-10 years, depending on your results and the type of screening test.  · Diabetes screening. This is done by checking your blood sugar (glucose) after you have not eaten for a while (fasting). You may have this done every 1-3 years.  · Sexually transmitted disease (STD) testing.  Follow these instructions at home:  Eating and drinking  · Eat a diet that includes fresh fruits and vegetables, whole grains, lean protein, and low-fat dairy products.  · Take vitamin and mineral supplements as  recommended by your health care provider.  · Do not drink alcohol if your health care provider tells you not to drink.  · If you drink alcohol:  ? Limit how much you have to 0-2 drinks a day.  ? Be aware of how much alcohol is in your drink. In the [place] one drink equals one 12 oz bottle of beer (355 mL), one 5 oz glass of wine (148 mL), or one 1½ oz glass of hard liquor (44 mL).  Lifestyle  · Take daily care of your teeth and gums.  · Stay active. Exercise for at least 30 minutes on 5 or more days each week.  · Do not use any products that contain nicotine or tobacco, such as cigarettes, e-cigarettes, and chewing tobacco. If you need help quitting, ask your health care provider.  · If you are sexually active, practice safe sex. Use a condom or other form of protection to prevent STIs (sexually transmitted infections).  · Talk with your health care provider about taking a low-dose aspirin every day starting at age 50.  What's next?  · Go to your health care provider once a year for a well check visit.  · Ask your health care provider how often you should have your eyes and teeth checked.  · Stay up to date on all vaccines.  This information is not intended to replace advice given to you by your health care provider. Make sure you discuss any questions you have with your health care provider.  Document Released: 01/13/2017 Document Revised: 12/12/2019 Document Reviewed: 12/12/2019  [employer] Patient Education © 2020 [employer]      Mediterranean Diet  A Mediterranean diet refers to food and lifestyle choices that are based on the traditions of countries located on the [place]. This way of eating has been shown to help prevent certain conditions and improve outcomes for people who have chronic diseases, like kidney disease and heart disease.  What are tips for following this plan?  Lifestyle  · Cook and eat meals together with your family, when possible.  · Drink enough fluid to keep your urine clear or  pale yellow.  · Be physically active every day. This includes:  ? Aerobic exercise like running or swimming.  ? Leisure activities like gardening, walking, or housework.  · Get 7-8 hours of sleep each night.  · If recommended by your health care provider, drink red wine in moderation. This means 1 glass a day for nonpregnant women and 2 glasses a day for men. A glass of wine equals 5 oz (150 mL).  Reading food labels    · Check the serving size of packaged foods. For foods such as rice and pasta, the serving size refers to the amount of cooked product, not dry.  · Check the total fat in packaged foods. Avoid foods that have saturated fat or trans fats.  · Check the ingredients list for added sugars, such as corn syrup.  Shopping  · At the grocery store, buy most of your food from the areas near the walls of the store. This includes:  ? Fresh fruits and vegetables (produce).  ? Grains, beans, nuts, and seeds. Some of these may be available in unpackaged forms or large amounts (in bulk).  ? Fresh seafood.  ? Poultry and eggs.  ? Low-fat dairy products.  · Buy whole ingredients instead of prepackaged foods.  · Buy fresh fruits and vegetables in-season from local [employer] markets.  · Buy frozen fruits and vegetables in resealable bags.  · If you do not have access to quality fresh seafood, buy precooked frozen shrimp or canned fish, such as tuna, salmon, or sardines.  · Buy small amounts of raw or cooked vegetables, salads, or olives from the deli or salad bar at your store.  · Stock your pantry so you always have certain foods on hand, such as olive oil, canned tuna, canned tomatoes, rice, pasta, and beans.  Cooking  · Cook foods with extra-virgin olive oil instead of using butter or other vegetable oils.  · Have meat as a side dish, and have vegetables or grains as your main dish. This means having meat in small portions or adding small amounts of meat to foods like pasta or stew.  · Use beans or vegetables instead of  meat in common dishes like chili or lasagna.  · [place] with different cooking methods. Try roasting or broiling vegetables instead of steaming or sautéeing them.  · Add frozen vegetables to soups, stews, pasta, or rice.  · Add nuts or seeds for added healthy fat at each meal. You can add these to yogurt, salads, or vegetable dishes.  · Marinate fish or vegetables using olive oil, lemon juice, garlic, and fresh herbs.  Meal planning    · Plan to eat 1 vegetarian meal one day each week. Try to work up to 2 vegetarian meals, if possible.  · Eat seafood 2 or more times a week.  · Have healthy snacks readily available, such as:  ? Vegetable sticks with hummus.  ? Greek yogurt.  ? Fruit and nut trail mix.  · Eat balanced meals throughout the week. This includes:  ? Fruit: 2-3 servings a day  ? Vegetables: 4-5 servings a day  ? Low-fat dairy: 2 servings a day  ? Fish, poultry, or lean meat: 1 serving a day  ? Beans and legumes: 2 or more servings a week  ? Nuts and seeds: 1-2 servings a day  ? Whole grains: 6-8 servings a day  ? Extra-virgin olive oil: 3-4 servings a day  · Limit red meat and sweets to only a few servings a month  What are my food choices?  · Mediterranean diet  ? Recommended  § Grains: Whole-grain pasta. Brown rice. Bulgar wheat. Polenta. Couscous. Whole-wheat bread. Oatmeal. Quinoa.  § Vegetables: Artichokes. Beets. Broccoli. Cabbage. Carrots. Eggplant. Green beans. Chard. Kale. Spinach. Onions. Leeks. Peas. Squash. Tomatoes. Peppers. Radishes.  § Fruits: Apples. Apricots. Avocado. Berries. Bananas. Cherries. Dates. Figs. Grapes. Isaías. Melon. Oranges. Peaches. Plums. Pomegranate.  § Meats and other protein foods: Beans. Almonds. Sunflower seeds. Pine nuts. Peanuts. Cod. [place]. Scallops. Shrimp. Tuna. Tilapia. Clams. Oysters. Eggs.  § Dairy: Low-fat milk. Cheese. Greek yogurt.  § Beverages: Water. Red wine. Herbal tea.  § Fats and oils: Extra virgin olive oil. Avocado oil. Grape seed oil.  § Sweets  and desserts: Greek yogurt with honey. Baked apples. Poached pears. Trail mix.  § Seasoning and other foods: Basil. Cilantro. Coriander. Cumin. Mint. Parsley. [place]. Rosemary. Tarragon. Garlic. Oregano. Thyme. Pepper. Balsalmic vinegar. Tahini. Hummus. Tomato sauce. Olives. Mushrooms.  ? Limit these  § Grains: Prepackaged pasta or rice dishes. Prepackaged cereal with added sugar.  § Vegetables: Deep fried potatoes (french fries).  § Fruits: Fruit canned in syrup.  § Meats and other protein foods: Beef. Pork. Lamb. Poultry with skin. Hot dogs. [place].  § Dairy: Ice cream. Sour cream. Whole milk.  § Beverages: Juice. Sugar-sweetened soft drinks. Beer. Liquor and spirits.  § Fats and oils: Butter. Canola oil. Vegetable oil. Beef fat (tallow). Lard.  § Sweets and desserts: Cookies. Cakes. Pies. Candy.  § Seasoning and other foods: Mayonnaise. Premade sauces and marinades.  The items listed may not be a complete list. Talk with your dietitian about what dietary choices are right for you.  Summary  · The Mediterranean diet includes both food and lifestyle choices.  · Eat a variety of fresh fruits and vegetables, beans, nuts, seeds, and whole grains.  · Limit the amount of red meat and sweets that you eat.  · Talk with your health care provider about whether it is safe for you to drink red wine in moderation. This means 1 glass a day for nonpregnant women and 2 glasses a day for men. A glass of wine equals 5 oz (150 mL).  This information is not intended to replace advice given to you by your health care provider. Make sure you discuss any questions you have with your health care provider.  Document Released: 08/10/2017 Document Revised: 08/17/2017 Document Reviewed: 08/10/2017  [email or web address] Patient Education © 2020 [employer]      Exercising to Stay Healthy  To become healthy and stay healthy, it is recommended that you do moderate-intensity and vigorous-intensity exercise. You can tell that you are exercising at a moderate  intensity if your heart starts beating faster and you start breathing faster but can still hold a conversation. You can tell that you are exercising at a vigorous intensity if you are breathing much harder and faster and cannot hold a conversation while exercising.  Exercising regularly is important. It has many health benefits, such as:  · Improving overall fitness, flexibility, and endurance.  · Increasing bone density.  · Helping with weight control.  · Decreasing body fat.  · Increasing muscle strength.  · Reducing stress and tension.  · Improving overall health.  How often should I exercise?  Choose an activity that you enjoy, and set realistic goals. Your health care provider can help you make an activity plan that works for you.  Exercise regularly as told by your health care provider. This may include:  · Doing strength training two times a week, such as:  ? Lifting weights.  ? Using resistance bands.  ? Push-ups.  ? Sit-ups.  ? Yoga.  · Doing a certain intensity of exercise for a given amount of time. Choose from these options:  ? A total of 150 minutes of moderate-intensity exercise every week.  ? A total of 75 minutes of vigorous-intensity exercise every week.  ? A mix of moderate-intensity and vigorous-intensity exercise every week.  Children, pregnant women, people who have not exercised regularly, people who are overweight, and older adults may need to talk with a health care provider about what activities are safe to do. If you have a medical condition, be sure to talk with your health care provider before you start a new exercise program.  What are some exercise ideas?  Moderate-intensity exercise ideas include:  · Walking 1 mile (1.6 km) in about 15 minutes.  · Biking.  · Hiking.  · Golfing.  · Dancing.  · Water aerobics.  Vigorous-intensity exercise ideas include:  · Walking 4.5 miles (7.2 km) or more in about 1 hour.  · Jogging or running 5 miles (8 km) in about 1 hour.  · Biking 10 miles (16.1  km) or more in about 1 hour.  · Lap swimming.  · Roller-skating or in-line skating.  · Cross-country skiing.  · Vigorous competitive sports, such as football, basketball, and soccer.  · Jumping rope.  · Aerobic dancing.  What are some everyday activities that can help me to get exercise?  · Yard work, such as:  ? Pushing a .  ? Raking and bagging leaves.  · Washing your car.  · Pushing a stroller.  · Shoveling snow.  · Gardening.  · Washing windows or floors.  How can I be more active in my day-to-day activities?  · Use stairs instead of an elevator.  · Take a walk during your lunch break.  · If you drive, park your car farther away from your work or school.  · If you take public transportation, get off one stop early and walk the rest of the way.  · Stand up or walk around during all of your indoor phone calls.  · Get up, stretch, and walk around every 30 minutes throughout the day.  · Enjoy exercise with a friend. Support to continue exercising will help you keep a regular routine of activity.  What guidelines can I follow while exercising?  · Before you start a new exercise program, talk with your health care provider.  · Do not exercise so much that you hurt yourself, feel dizzy, or get very short of breath.  · Wear comfortable clothes and wear shoes with good support.  · Drink plenty of water while you exercise to prevent dehydration or heat stroke.  · Work out until your breathing and your heartbeat get faster.  Where to find more information  · [place] Department of Health and Human Services: www.hhs.gov  · Centers for Disease Control and Prevention (CDC): www.cdc.gov  Summary  · Exercising regularly is important. It will improve your overall fitness, flexibility, and endurance.  · Regular exercise also will improve your overall health. It can help you control your weight, reduce stress, and improve your bone density.  · Do not exercise so much that you hurt yourself, feel dizzy, or get very short of  breath.  · Before you start a new exercise program, talk with your health care provider.  This information is not intended to replace advice given to you by your health care provider. Make sure you discuss any questions you have with your health care provider.  Document Released: 01/20/2012 Document Revised: 11/30/2018 Document Reviewed: 11/08/2018  [employer] Patient Education © 2020 [employer]       ADVANCE CARE PLANNING  Conversations that Matter  PLANNING GUIDE    LOOKING BACK ...  Who we are, what we believe, and what we value are all shaped by experiences we have had. Worship, family traditions, jobs and friends affect us deeply.    Has anything happened in your past that shaped your feelings about medical treatment?    Think about an experience you may have had with a family member or friend who was faced with a decision about medical care near the end of life. What was positive about that experience? What do you wish would have been done differently?    HERE AND NOW ...  Do you have any significant health problems now? What kinds of things bring you such doreen that, should a health problem prevent you from doing them any more, life would have little meaning? What short- or long-term goals do you have? How might medical treatment help you or hinder you in accomplishing those goals?    WHAT ABOUT TOMORROW?  What significant health problems do you fear may affect you in the future? How do you feel about the possibility of having to go to a nursing home? How would decisions be made if you could not make them?    WHO SHOULD MAKE DECISIONS?  An important part of planning is to consider whether or not you could appoint someone to make your healthcare decisions if you could not make them yourself. Many people select a close family member, but you are free to pick anyone you think could best represent you. Whoever you appoint should have all of the following qualifications:    • Can be trusted.  • Is willing to  "accept this responsibility.  • Is willing to follow the values and instructions you have discussed.  • Is able to make complex, difficult decisions.    It is helpful, but not required, to appoint one or more alternate persons in case your first choice becomes unable or unwilling to represent you. It is best if only one person has authority at a time, but you can instruct your representatives to discuss decisions together if time permits.    WHAT FUTURE DECISIONS NEED TO BE CONSIDERED?  Providing instructions for future healthcare decisions may seem like an impossible task. How can anyone plan for all the possibilities? You cannot … but you do not have to.    You need to plan for situations where you:  1. Become unexpectedly incapable of making your own decisions  2. It is clear you will have little or no recovery, and  3. The injury or loss of function is significant.    Such a situation might arise because of an injury to the brain from an accident, a stroke, or a slowly progressive disease such as Alzheimer's.    To plan for this type of situation, many people state, \"If I'm going to be a vegetable, let me go,\" or \"No heroics,\" or \"Don't keep me alive on machines.\" While these remarks are a beginning, they simply are too vague to guide decision-making.    You need to completely describe under what circumstances your goals for medical care should be changed from attempting to prolong life to being allowed to die. In some situations, certain treatments may not make sense because they will not help, but other treatments will be of important benefit.    Consider these three questions:  1. When would it make sense to continue certain treatments in an effort to prolong life and seek recovery?  2. When would it make sense to stop or withhold certain treatments and accept death when it comes?  3. Under any circumstance, what kind of comfort care would you want, including medication, spiritual and environmental " options?    Making these choices requires understanding the information, weighing the benefits and burdens from your perspective, and then discussing your choices with those closest to you.    WHAT'S NEXT?  How do you make sure that your choices are respected? First talk, about them with your family, friends, clergy and physician, then put your choices in writing. Information about putting your plans into writing -- in an advance directive -- is available from your healthcare organization or .    Do you have any significant health problems? What health problems do you fear in the future?     Consider what frightens you most about medical treatment. What role does Moravian, adali or spirituality play in how you live your life? How does cost influence your decisions about medical care?   In terms of future medical care, under what circumstances would you want the goals of medical treatment to switch from attempting to prolong life to focusing on comfort?     Describe these circumstances in as much detail as possible.   Ask yourself, what will most help me live well at this point in my life?     Share your views with the person or people who would make your medical decisions if you could not make them.     THERE'S NO EASY WAY TO PLAN FOR FUTURE HEALTHCARE CHOICES.  It's a process that involves thinking and talking about complex and sensitive issues.    The questions that follow will help in the advance care planning process. This is a guide for your own benefit; it's not a test, and there are no right or wrong answers. It does not need to be completed all at once. You may use it to share your feelings with healthcare providers, your family and your friends. The answers to these questions will help those you love make choices for you when you cannot make them yourself.    These are things I need to tell my loved ones:  What is your idea of comfort care? Describe how you would want medications to be used to  provide comfort. What type of spiritual care would you want?     I need to learn about: ____________________________  I need to ask my healthcare provider: ________________

## 2020-06-22 NOTE — PROGRESS NOTES
The ABCs of the Annual Wellness Visit  Subsequent Medicare Wellness Visit    Chief Complaint   Patient presents with   • Medicare Wellness-subsequent       Subjective   History of Present Illness:  Sreedhar Raines is a 50 y.o. male with a concurrent medical history of cerebral palsy and intellectual disability who presents for a Subsequent Medicare Wellness Visit.  History is provided by patient's mother who serves as patient's primary caregiver.  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      Patient has a concurrent medical history of epilepsy that is currently treated with a vagal nerve stimulator, Lamictal 250 mg twice daily, Zonegran 100 mg twice daily, and phenytoin 250 mg daily.  Patient is followed by neurology.  Mother reports for seizures since his last visit in March 2020 that were ceased by his vagal nerve stimulator.      HEALTH RISK ASSESSMENT    Recent Hospitalizations:  No hospitalization(s) within the last year.    Current Medical Providers:  Patient Care Team:  Concepción Sandra MD as PCP - General (Family Medicine)    Smoking Status:  Social History     Tobacco Use   Smoking Status Never Smoker   Smokeless Tobacco Never Used       Alcohol Consumption:  Social History     Substance and Sexual Activity   Alcohol Use Never   • Frequency: Never       Depression Screen:   PHQ-2/PHQ-9 Depression Screening 6/22/2020   Little interest or pleasure in doing things 0   Feeling down, depressed, or hopeless 1   Trouble falling or staying asleep, or sleeping too much 0   Feeling tired or having little energy 0   Poor appetite or overeating 0   Feeling bad about yourself - or that you are a failure or have let yourself or your family down 1   Trouble concentrating on things, such as reading the newspaper or watching television 0   Moving or speaking so slowly that other people could have noticed. Or the opposite - being so  fidgety or restless that you have been moving around a lot more than usual 0   Thoughts that you would be better off dead, or of hurting yourself in some way 0   Total Score 2   If you checked off any problems, how difficult have these problems made it for you to do your work, take care of things at home, or get along with other people? Not difficult at all       Fall Risk Screen:  KATELYN Fall Risk Assessment has not been completed.    Health Habits and Functional and Cognitive Screening:  Functional & Cognitive Status 6/22/2020   Do you have difficulty preparing food and eating? Yes   Do you have difficulty bathing yourself, getting dressed or grooming yourself? No   Do you have difficulty using the toilet? No   Do you have difficulty moving around from place to place? No   Do you have trouble with steps or getting out of a bed or a chair? Yes   Current Diet Well Balanced Diet   Dental Exam Not up to date   Eye Exam Not up to date   Exercise (times per week) 0 times per week   Current Exercise Activities Include None   Do you need help using the phone?  Yes   Are you deaf or do you have serious difficulty hearing?  No   Do you need help with transportation? Yes   Do you need help shopping? Yes   Do you need help preparing meals?  Yes   Do you need help with housework?  No   Do you need help with laundry? Yes   Do you need help taking your medications? Yes   Do you need help managing money? Yes   Do you ever drive or ride in a car without wearing a seat belt? No   Have you felt unusual stress, anger or loneliness in the last month? Yes   Who do you live with? Other   If you need help, do you have trouble finding someone available to you? No   Have you been bothered in the last four weeks by sexual problems? No   Do you have difficulty concentrating, remembering or making decisions? No         Does the patient have evidence of cognitive impairment? Yes    Asprin use counseling:Does not need ASA (and currently is not  on it)    Age-appropriate Screening Schedule:  Refer to the list below for future screening recommendations based on patient's age, sex and/or medical conditions. Orders for these recommended tests are listed in the plan section. The patient has been provided with a written plan.    Health Maintenance   Topic Date Due   • TDAP/TD VACCINES (1 - Tdap) 06/18/1982   • COLONOSCOPY  10/16/2019   • INFLUENZA VACCINE  08/01/2020          The following portions of the patient's history were reviewed and updated as appropriate: allergies, current medications, past family history, past medical history, past social history, past surgical history and problem list.    Outpatient Medications Prior to Visit   Medication Sig Dispense Refill   • DILANTIN 100 MG ER capsule Take 200 mg by mouth Daily.     • lamoTRIgine (LaMICtal) 100 MG tablet Take 2.5 tabs in the morning and 2 at night for 2 weeks, then increase to 2.5 tabs twice daily 450 tablet 3   • levocetirizine (XYZAL) 5 MG tablet Take 1 tablet by mouth Every Evening. 90 tablet 1   • LORazepam (ATIVAN) 1 MG tablet Take 1 tablet by mouth Every 8 (Eight) Hours As Needed for Anxiety (agitation). 86 tablet 0   • montelukast (SINGULAIR) 10 MG tablet Take 10 mg by mouth Every Night.     • PHENYTOIN INFATABS 50 MG chewable tablet CHEW 1 TABLET BY MOUTH EVERY DAY 90 tablet 10   • risperiDONE (risperDAL) 0.5 MG tablet Take 1 tablet by mouth Every Night. 30 tablet 5   • zonisamide (ZONEGRAN) 100 MG capsule TAKE ONE CAPSULE BY MOUTH TWICE DAILY 180 capsule 0     No facility-administered medications prior to visit.        Patient Active Problem List   Diagnosis   • Epilepsy (CMS/HCC)   • Encounter for interrogation of neurostimulator   • Spastic hemiplegic cerebral palsy (CMS/HCC)   • Mood disorder (CMS/HCC)   • Agitation   • Environmental and seasonal allergies       Advanced Care Planning:  ACP discussion was held with the patient during this visit. Patient does not have an advance  "directive, information provided.    Review of Systems    Compared to one year ago, the patient feels his physical health is better.  Compared to one year ago, the patient feels his mental health is better.    Reviewed chart for potential of high risk medication in the elderly: not applicable  Reviewed chart for potential of harmful drug interactions in the elderly:not applicable    Objective         Vitals:    06/22/20 1359   BP: 128/85   BP Location: Left arm   Patient Position: Sitting   Cuff Size: Small Adult   Pulse: 71   Temp: 98.6 °F (37 °C)   TempSrc: Infrared   SpO2: 100%   Weight: 61.7 kg (136 lb)   Height: 165.1 cm (65\")       Body mass index is 22.63 kg/m².  Discussed the patient's BMI with him. The BMI is in the acceptable range.    Physical Exam   Constitutional: He is oriented to person, place, and time. He appears well-developed and well-nourished. No distress.   HENT:   Head: Normocephalic and atraumatic.   Right Ear: Tympanic membrane and ear canal normal.   Left Ear: Tympanic membrane and ear canal normal.   Nose: Nose normal.   Mouth/Throat: Oropharynx is clear and moist and mucous membranes are normal.   Eyes: Pupils are equal, round, and reactive to light. Conjunctivae and EOM are normal. No scleral icterus.   Neck: Normal range of motion. Neck supple. No tracheal deviation present. No thyromegaly present.   Cardiovascular: Normal rate, regular rhythm, normal heart sounds and intact distal pulses.   Pulmonary/Chest: Effort normal and breath sounds normal. He has no wheezes. He has no rales.   Abdominal: Soft. Bowel sounds are normal.   Musculoskeletal: Normal range of motion. He exhibits no edema or tenderness.   Neurological: He is alert and oriented to person, place, and time.   Skin: Skin is warm and dry. No rash noted. He is not diaphoretic.   Psychiatric: He has a normal mood and affect. His behavior is normal. His speech is delayed.   Vitals reviewed.        Lab Results   Component Value " Date    WBC 4.46 03/24/2020    HGB 13.3 03/24/2020    HCT 38.2 03/24/2020    MCV 92.0 03/24/2020     03/24/2020     Lab Results   Component Value Date    GLUCOSE 83 03/24/2020    BUN 11 03/24/2020    CREATININE 1.00 03/24/2020    EGFRIFNONA 80 03/24/2020    BCR 11.0 03/24/2020    K 3.8 03/24/2020    CO2 24.7 03/24/2020    CALCIUM 9.3 03/24/2020    ALBUMIN 4.40 03/24/2020    AST 11 03/24/2020    ALT 13 03/24/2020     No results found for: CHOL, CHLPL, TRIG, HDL, LDL, LDLDIRECT    Assessment/Plan   Medicare Risks and Personalized Health Plan  CMS Preventative Services Quick Reference  Advance Directive Discussion  Cardiovascular risk  Colon Cancer Screening  Dementia/Memory   Depression/Dysphoria  Immunizations Discussed/Encouraged (specific immunizations; Td )  Inadequate Social Support, Isolation, Loneliness, Lack of Transportation, Financial Difficulties, or Caregiver Stress   Inactivity/Sedentary    The above risks/problems have been discussed with the patient.  Pertinent information has been shared with the patient in the After Visit Summary.  Follow up plans and orders are seen below in the Assessment/Plan Section.    Diagnoses and all orders for this visit:    1. Spastic hemiplegic cerebral palsy (CMS/HCC) (Primary)  Comments:  Associated with intellectual disability.  Does not have decision-making capacity.  Mother has guardianship.  Records provided in office.  Discussed ACP.    2. Partial symptomatic epilepsy with simple partial seizures, intractable, without status epilepticus (CMS/[facility])  Comments:  S/p vagal nerve stimulator implant.  Followed by neurology.  Lamictal 250 mg BID, dilantin 250 mg, & Zonesamide 100 mg BID.    3. Mood disorder (CMS/HCC)  Comments:  Associated with cerebral palsy.  Well-controlled with risperidone 0.5 mg p.o. nightly.  Using Ativan 1 mg PRN, approximately 2x/week.    4. Impairment of cognitive function  Comments:  Mother has guardianship.  Discussed advance care  planning.  Educational material provided in AVS.    5. Environmental and seasonal allergies  -     montelukast (SINGULAIR) 10 MG tablet; Take 1 tablet by mouth Every Night.  Dispense: 90 tablet; Refill: 1    6. Encounter for screening for cardiovascular disorders  -     Lipid panel    7. Encounter for Medicare annual wellness exam  -     Lipid panel      Follow Up:  Return in about 6 months (around 12/22/2020) for Recheck mood & epilepsy..     An After Visit Summary and PPPS were given to the patient.     Signature    Concepción Sandra MD  Family Medicine  [facility]        This document has been electronically signed by Concepción Sandra MD on June 22, 2020 14:39

## 2020-06-23 LAB
CHOLEST SERPL-MCNC: 210 MG/DL (ref 0–200)
HDLC SERPL-MCNC: 62 MG/DL (ref 40–60)
LDLC SERPL CALC-MCNC: 109 MG/DL (ref 0–100)
LDLC/HDLC SERPL: 1.76 {RATIO}
TRIGL SERPL-MCNC: 193 MG/DL (ref 0–150)
VLDLC SERPL-MCNC: 38.6 MG/DL (ref 5–40)

## 2020-07-20 ENCOUNTER — OFFICE VISIT (OUTPATIENT)
Dept: NEUROLOGY | Facility: CLINIC | Age: 50
End: 2020-07-20

## 2020-07-20 VITALS
HEIGHT: 65 IN | HEART RATE: 78 BPM | OXYGEN SATURATION: 99 % | BODY MASS INDEX: 22.66 KG/M2 | SYSTOLIC BLOOD PRESSURE: 122 MMHG | WEIGHT: 136 LBS | DIASTOLIC BLOOD PRESSURE: 80 MMHG

## 2020-07-20 DIAGNOSIS — G40.119 PARTIAL SYMPTOMATIC EPILEPSY WITH SIMPLE PARTIAL SEIZURES, INTRACTABLE, WITHOUT STATUS EPILEPTICUS (HCC): Primary | ICD-10-CM

## 2020-07-20 DIAGNOSIS — Z46.2 ENCOUNTER FOR INTERROGATION OF NEUROSTIMULATOR: ICD-10-CM

## 2020-07-20 PROCEDURE — 99213 OFFICE O/P EST LOW 20 MIN: CPT | Performed by: PSYCHIATRY & NEUROLOGY

## 2020-07-20 PROCEDURE — 95977 ALYS CPLX CN NPGT PRGRMG: CPT | Performed by: PSYCHIATRY & NEUROLOGY

## 2020-07-20 NOTE — PATIENT INSTRUCTIONS
[facility]  Payton Harrington MD  Neurology clinic  [phone]    With anti-seizure medications, you may initially notice side effects of fatigue, drowsiness, unsteadiness, and dizziness.  Other possible side effects include nausea, abdominal pain, headache, blurry or double vision, slurred speech and mood changes.  Generally, patients will noticed these symptoms when the medication is first started or with higher doses and will go away with time.    It is import to consistently take your medication every day.  Missing just one dose may put you at risk for a breakthrough seizure.  Consider using reminders on your phone or a pill box.    If you develop a rash, please call the neurology clinic immediately or notify another healthcare professional, as this may be potentially life-threatening.  If you are unable to reach a healthcare professional, go to the emergency room immediately for further evaluation.    If you develop thoughts of wanting to hurt yourself or others, please call the neurology clinic immediately to notify another healthcare professional.  If you are unable to reach a healthcare professional, go to the emergency room immediately for further evaluation.    It is the [place] state law that you cannot drive within 90 days of a seizure.    You should avoid certain activities that if you were to have a seizure, you could harm yourself or others. In general, it is recommended that you avoid operating heavy machinery or power tools, swimming or taking baths by yourself (showers are ok), don't stand over open flames, don't get on high ladders or the roof.  I also recommend to avoid sleeping on your stomach.    For further information on epilepsy and resources available to patients and their families, please visit the [facility] at www.efky.org or call [phone].    **Check out the [facility]'s monthly Art Group Gathering.  They are  located at [facility], [address] [place] [address].  Call Molly Hebert at [phone] or email her at [email or web address] for the dates of future gatherings.**      **If you have having memory problems, consider HOBSCOTCH (Home-Based Self-management and Cognitive Training Changes lives).  It is an 8 week self-management program for adults with epilepsy and memory problems.  The program is free at the [facility].  Contact Love Rojas at [phone] or [email or web address].**

## 2020-07-20 NOTE — PROGRESS NOTES
Subjective:     Patient ID: Sreedhar Raines is a 50 y.o. male.      Kalpesh is a 50-year-old male with history of allergies, developmental delay, intractable epilepsy status post VNS, and cerebral palsy who presents to the neurology clinic today as an established patient for follow-up for seizures.  The patient was a new patient back in December 2019.  He was last seen in January.  For details regarding his history please refer that note.  He has failed Depakote, carbamazepine, Keppra, and Topamax in the past.  He had mouth blisters with carbamazepine.  He is currently on name brand only Dilantin 250 mg at night, lamotrigine 250 mg twice a day, and zonisamide at 100 mg twice a day.  We had decreased his Dilantin due to episodic unsteadiness.  That seemed to help his symptoms.  Back in March we increase his Lamictal because of an increase in seizures.  Since then he has had 6 whole-body convulsions.  He also is having near daily right shoulder jerking.  It started initially in October of last year.  It occurs 8-10 times per day.  He also reports he has a hard time catching his breath and they feel like it is been more frequent the last 3 months.    The following portions of the patient's history were reviewed and updated as appropriate: allergies, current medications, past family history, past medical history, past social history, past surgical history and problem list.    Review of Systems   Respiratory: Negative for cough, chest tightness and shortness of breath.    Cardiovascular: Negative for chest pain, palpitations and leg swelling.   Neurological: Positive for tremors (tic in right shoulder pretty constant) and seizures (9 seizures since last visit). Negative for dizziness, syncope, facial asymmetry, speech difficulty, weakness, light-headedness, numbness and headaches.   Hematological: Negative for adenopathy. Does not bruise/bleed easily.   Psychiatric/Behavioral: Positive for sleep disturbance (due to tic in  shoulder). Negative for agitation, behavioral problems, confusion, decreased concentration, dysphoric mood, hallucinations, self-injury and suicidal ideas. The patient is not nervous/anxious and is not hyperactive.         Objective:    Neurologic Exam    Physical Exam     I interrogated the patient's VNS today.  They tolerated the procedure well without any adverse side effects.  Below are the current settings.        Assessment/Plan:    Kalpesh is a 50-year-old male with history of allergies, developmental delay, intractable epilepsy status post VNS, and cerebral palsy who presents today for follow-up.    1.  Intractable epilepsy-Unfortunately he has had 6 seizures since we increased his Lamictal and is also having more right shoulder jerking.  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  I did increase his VNS today.  I will contact mom in 2 weeks.  If the shoulder jerking continues we may want to increase his Zonegran.  He may also benefit from taking a little Onfi at night.  Briviact, Fycompa, or Vimpat may be also options.  Mom needs me to fill out paperwork to transition his guardianship from [place] to [place].    A total of 20 minutes of face-to-face time was spent with the patient and his mother greater than 50% that time was spent on counseling regarding VNS and medication management.       Problems Addressed this Visit        Nervous and Auditory    Epilepsy (CMS/HCC) - Primary       Other    Encounter for interrogation of neurostimulator

## 2020-07-21 ENCOUNTER — TELEPHONE (OUTPATIENT)
Dept: NEUROLOGY | Facility: CLINIC | Age: 50
End: 2020-07-21

## 2020-07-21 DIAGNOSIS — R45.1 AGITATION: ICD-10-CM

## 2020-07-21 DIAGNOSIS — F39 MOOD DISORDER (HCC): ICD-10-CM

## 2020-07-21 RX ORDER — LORAZEPAM 1 MG/1
1 TABLET ORAL EVERY 8 HOURS PRN
Qty: 60 TABLET | Refills: 0 | Status: SHIPPED | OUTPATIENT
Start: 2020-07-21 | End: 2020-10-26

## 2020-07-21 NOTE — TELEPHONE ENCOUNTER
Patient with a concurrent medical history of cerebral palsy and mood disorder requesting refill of Ativan 1 mg p.o. 3 times daily PRN for agitation and aggressive behavior.  Medication is controlled by patient's guardian, mother.  Prescription last filled on February 20, 2020 per inspect report.

## 2020-07-21 NOTE — TELEPHONE ENCOUNTER
Patient Name:  BLADE KILGORE   :  [id number]    MRN:  [id number]     Hub staff attempted to follow warm transfer process and was unsuccessful. Patient is needing: FRANCISCO J WAS RETURNING BRUCE'S CALL.     Best call back number: [phone]

## 2020-08-03 ENCOUNTER — PATIENT MESSAGE (OUTPATIENT)
Dept: NEUROLOGY | Facility: CLINIC | Age: 50
End: 2020-08-03

## 2020-08-03 RX ORDER — ZONISAMIDE 100 MG/1
CAPSULE ORAL
Qty: 180 CAPSULE | Refills: 0 | Status: SHIPPED | OUTPATIENT
Start: 2020-08-03 | End: 2020-10-28

## 2020-08-03 NOTE — TELEPHONE ENCOUNTER
From: Payton Harrington MD  To: Sreedhar Raines  Sent: 8/3/2020 10:06 AM EDT  Subject: Neurology follow up    I wanted to follow-up and see how Kalpesh's right shoulder jerking is doing. Is it any better?    Sincerely,  Payton Harrington MD  [facility] Neurology  [phone]

## 2020-08-04 RX ORDER — ZONISAMIDE 50 MG/1
CAPSULE ORAL
Qty: 180 CAPSULE | Refills: 4 | Status: SHIPPED | OUTPATIENT
Start: 2020-08-04 | End: 2021-02-11

## 2020-08-04 RX ORDER — ZONISAMIDE 50 MG/1
50 CAPSULE ORAL 2 TIMES DAILY
Qty: 60 CAPSULE | Refills: 11 | Status: SHIPPED | OUTPATIENT
Start: 2020-08-04 | End: 2020-08-04

## 2020-08-29 DIAGNOSIS — J30.89 ENVIRONMENTAL AND SEASONAL ALLERGIES: ICD-10-CM

## 2020-08-31 RX ORDER — LEVOCETIRIZINE DIHYDROCHLORIDE 5 MG/1
5 TABLET, FILM COATED ORAL EVERY EVENING
Qty: 90 TABLET | Refills: 1 | Status: SHIPPED | OUTPATIENT
Start: 2020-08-31 | End: 2021-02-19 | Stop reason: SDUPTHER

## 2020-09-29 ENCOUNTER — FLU SHOT (OUTPATIENT)
Dept: FAMILY MEDICINE CLINIC | Facility: CLINIC | Age: 50
End: 2020-09-29

## 2020-09-29 DIAGNOSIS — Z23 IMMUNIZATION DUE: Primary | ICD-10-CM

## 2020-09-29 PROCEDURE — 90686 IIV4 VACC NO PRSV 0.5 ML IM: CPT | Performed by: FAMILY MEDICINE

## 2020-09-29 PROCEDURE — G0008 ADMIN INFLUENZA VIRUS VAC: HCPCS | Performed by: FAMILY MEDICINE

## 2020-09-30 DIAGNOSIS — R45.1 AGITATION: ICD-10-CM

## 2020-09-30 DIAGNOSIS — F39 MOOD DISORDER (HCC): ICD-10-CM

## 2020-09-30 RX ORDER — RISPERIDONE 0.5 MG/1
0.5 TABLET ORAL NIGHTLY
Qty: 30 TABLET | Refills: 5 | Status: SHIPPED | OUTPATIENT
Start: 2020-09-30 | End: 2021-03-28

## 2020-10-25 DIAGNOSIS — R45.1 AGITATION: ICD-10-CM

## 2020-10-25 DIAGNOSIS — G80.2 SPASTIC HEMIPLEGIC CEREBRAL PALSY (HCC): Primary | ICD-10-CM

## 2020-10-25 DIAGNOSIS — F39 MOOD DISORDER (HCC): ICD-10-CM

## 2020-10-26 ENCOUNTER — OFFICE VISIT (OUTPATIENT)
Dept: NEUROLOGY | Facility: CLINIC | Age: 50
End: 2020-10-26

## 2020-10-26 ENCOUNTER — TELEPHONE (OUTPATIENT)
Dept: NEUROLOGY | Facility: CLINIC | Age: 50
End: 2020-10-26

## 2020-10-26 VITALS
OXYGEN SATURATION: 99 % | HEART RATE: 78 BPM | DIASTOLIC BLOOD PRESSURE: 84 MMHG | BODY MASS INDEX: 22.66 KG/M2 | SYSTOLIC BLOOD PRESSURE: 120 MMHG | WEIGHT: 136 LBS | HEIGHT: 65 IN

## 2020-10-26 DIAGNOSIS — G40.814 INTRACTABLE LENNOX-GASTAUT SYNDROME WITHOUT STATUS EPILEPTICUS (HCC): ICD-10-CM

## 2020-10-26 DIAGNOSIS — R56.9 GENERALIZED CONVULSIVE SEIZURES (HCC): Primary | ICD-10-CM

## 2020-10-26 PROCEDURE — 95970 ALYS NPGT W/O PRGRMG: CPT | Performed by: PSYCHIATRY & NEUROLOGY

## 2020-10-26 PROCEDURE — 99214 OFFICE O/P EST MOD 30 MIN: CPT | Performed by: PSYCHIATRY & NEUROLOGY

## 2020-10-26 RX ORDER — LORAZEPAM 1 MG/1
1 TABLET ORAL EVERY 8 HOURS PRN
Qty: 60 TABLET | Refills: 0 | Status: SHIPPED | OUTPATIENT
Start: 2020-10-26 | End: 2021-02-04

## 2020-10-26 RX ORDER — CLOBAZAM 10 MG/1
5 TABLET ORAL 2 TIMES DAILY
Qty: 30 TABLET | Refills: 3 | Status: SHIPPED | OUTPATIENT
Start: 2020-10-26 | End: 2021-01-13 | Stop reason: SINTOL

## 2020-10-26 NOTE — PROGRESS NOTES
Subjective:     Patient ID: Sreedhar Raines is a 50 y.o. male.    Kalpesh is a 50-year-old male with history of allergies, developmental delay, intractable epilepsy status post VNS, and cerebral palsy who presents to the neurology clinic today as an established patient for follow-up for seizures.  The patient was last seen on July 20, 2020.  The patient was a new patient back in December 2019.  For details regarding his history please refer to that note.  He has failed Depakote, carbamazepine, Keppra, and Topamax in the past.  He had mouth blisters with carbamazepine.  He is currently on name brand only Dilantin 250 mg at night, lamotrigine 250 mg twice a day, and zonisamide 100 mg twice a day.  We decreased Dilantin due to episodic unsteadiness to increase his Lamictal due to increase in seizures.  Starting last year he began having some right shoulder jerking that I felt was likely from myoclonus.  Mom feels like his seizures are better since his last visit.  He is only had 5.  The shoulder jerking is worse.  It can occur anytime of day.  Unfortunately they have been having trouble getting him an [place] license.    The following portions of the patient's history were reviewed and updated as appropriate: allergies, current medications, past family history, past medical history, past social history, past surgical history and problem list.    Review of Systems   Respiratory: Negative for cough, chest tightness and shortness of breath.    Cardiovascular: Negative for chest pain, palpitations and leg swelling.   Neurological: Positive for seizures.        Objective:    Neurologic Exam    Physical Exam     I interrogated the patient's VNS today.  They tolerated the procedure well without any adverse side effects.  Below are the current settings.        Assessment/Plan:    Kalpesh is a 50-year-old male with a history of allergies, developmental delay, intractable epilepsy status post VNS and cerebral palsy who presents today  for follow-up.    1.  Intractable epilepsy-although his seizures are better he continues to have what sounds like myoclonic jerking of his shoulder.  I would like to add on Onfi 5 mg twice a day.  We discussed trying to limit the use of lorazepam with Onfi.  If she needs to use it for behavior I recommend trying the lowest dose of 0.5 mg and monitoring for sedation.  We will see the patient back in 3 months time or sooner if needed.  His Onfi can be further titrated in the future.    A total of 25 minutes of face-to-face time was spent with the patient and his mother and greater than 50% of time spent on counseling regarding his symptoms and medication management.     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

## 2020-10-26 NOTE — TELEPHONE ENCOUNTER
Patient with CMHx of CP and mood disorder manifesting as agitation and violent outbursts requesting refill of Ativan 1 mg PO TID PRN. Medication administered by mother, primary caregiver. Outbursts well controlled since starting neuroleptic. Prescription last filled on 07/21/2020 per INSPECT report. Health risks associated with chronic sedative use discussed with mother.

## 2020-10-28 RX ORDER — ZONISAMIDE 100 MG/1
CAPSULE ORAL
Qty: 180 CAPSULE | Refills: 3 | Status: SHIPPED | OUTPATIENT
Start: 2020-10-28 | End: 2021-02-11 | Stop reason: SDUPTHER

## 2020-11-30 RX ORDER — PHENYTOIN SODIUM 100 MG/1
CAPSULE, EXTENDED RELEASE ORAL
Qty: 90 CAPSULE | Refills: 3 | Status: SHIPPED | OUTPATIENT
Start: 2020-11-30 | End: 2021-03-23

## 2020-12-10 ENCOUNTER — TELEPHONE (OUTPATIENT)
Dept: NEUROLOGY | Facility: CLINIC | Age: 50
End: 2020-12-10

## 2020-12-10 ENCOUNTER — PATIENT MESSAGE (OUTPATIENT)
Dept: NEUROLOGY | Facility: CLINIC | Age: 50
End: 2020-12-10

## 2020-12-10 NOTE — TELEPHONE ENCOUNTER
PT'S MOTHER FRANCISCO J IS CALLING SHE STATES SHE SPOKE WITH DR JONES EARLIER TODAY  ABOUT NEW SEIZURES . pT IS SCHEDULED FOR F/U ON 2/3/21 AT 1PM HOWEVER MOTHER IS CONCERNED THIS IS TOO FAR OUT SINCE PT HAS BEEN HAVING NEW SEIZURES    PLEASE ADVISE  CALL BACK @ [phone]

## 2020-12-10 NOTE — TELEPHONE ENCOUNTER
From: Sreedhar Raines  To: Payton Harrington MD  Sent: 12/10/2020 9:38 AM EST  Subject: Prescription Question    It's been one week since decreasing Kalpesh's Clobazam. He continues to have side effects but now he is also fearful and sarah everyday. PLUS, now the shoulder is jerking again. He is pretty miserable and we can't go anywhere because he can't walk without holding on to something. He only gets about two hours per day of feeling somewhat normal. HELP?

## 2020-12-14 ENCOUNTER — OFFICE VISIT (OUTPATIENT)
Dept: NEUROLOGY | Facility: CLINIC | Age: 50
End: 2020-12-14

## 2020-12-14 VITALS
SYSTOLIC BLOOD PRESSURE: 120 MMHG | WEIGHT: 137 LBS | BODY MASS INDEX: 22.82 KG/M2 | DIASTOLIC BLOOD PRESSURE: 82 MMHG | OXYGEN SATURATION: 99 % | HEIGHT: 65 IN | HEART RATE: 74 BPM

## 2020-12-14 DIAGNOSIS — G40.814 INTRACTABLE LENNOX-GASTAUT SYNDROME WITHOUT STATUS EPILEPTICUS (HCC): ICD-10-CM

## 2020-12-14 DIAGNOSIS — G40.119 PARTIAL SYMPTOMATIC EPILEPSY WITH SIMPLE PARTIAL SEIZURES, INTRACTABLE, WITHOUT STATUS EPILEPTICUS (HCC): Primary | ICD-10-CM

## 2020-12-14 DIAGNOSIS — Z46.2 ENCOUNTER FOR INTERROGATION OF NEUROSTIMULATOR: ICD-10-CM

## 2020-12-14 PROCEDURE — 99214 OFFICE O/P EST MOD 30 MIN: CPT | Performed by: PSYCHIATRY & NEUROLOGY

## 2020-12-14 PROCEDURE — 95976 ALYS SMPL CN NPGT PRGRMG: CPT | Performed by: PSYCHIATRY & NEUROLOGY

## 2020-12-14 NOTE — PROGRESS NOTES
Subjective:     Patient ID: Sreedhar Raines is a 50 y.o. male.    Kalpesh is a 50-year-old male with history of allergies, developmental delay, intractable epilepsy status post VNS, and cerebral palsy who presents today as established patient for follow-up for seizures.  The patient was a new patient back in December 2019 and was last seen in October 2020.  He has failed Depakote, carbamazepine, Keppra, Topamax in the past.  He is currently on name brand only Dilantin 250 mg at night, lamotrigine 250 mg twice a day, and Zonegran 100 mg twice a day.  In the past we decreased Dilantin due to episodic unsteadiness.  His seizures are better but he has continued to have some right shoulder jerking.  At his last visit we start Onfi 5 mg twice a day.  Within a week the jerking stopped but after 4 weeks he started having side effects including feeling wobbly, unsteady, speech slurring, anxiety, and depression.  When we decreased it once a day the side effects persisted but the jerking return.  He is back up to 5 mg twice a day for the past few days.  He has not had a big seizure since his last visit.    The following portions of the patient's history were reviewed and updated as appropriate: allergies, current medications, past family history, past medical history, past social history, past surgical history and problem list.    Review of Systems   Respiratory: Negative for cough, chest tightness and shortness of breath.    Cardiovascular: Negative for chest pain, palpitations and leg swelling.   Neurological: Positive for seizures.        Objective:    Neurologic Exam    Physical Exam   I interrogated the patient's VNS today.  They tolerated the procedure well without any adverse side effects.  Below are the current settings.        Assessment/Plan:    The patient is a 50-year-old male with history of allergies, developmental delay, intractable epilepsy status post VNS and cerebral palsy who presents today for follow-up.    1.   Intractable epilepsy-the patient seizures are better however he continues to have myoclonic jerking with side effects to Onfi.  He is on such a low dose I suspect that it may be a combination of his total medication burden.  I would like to decrease Dilantin to 200 mg nightly.  After 1 to 2 weeks if he is not having seizures but persistent side effects we can further decrease that to 150 mg.  Ultimately, we may be able to wean the medication off.  If he continues to have side effects to Onfi then we may want to try something like Briviact.    A total of 25 minutes of face-to-face time was spent with the patient and his mother and greater than 50% of time was spent on counseling regarding his symptoms and medication management.  We also adjusted his VNS as well.       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

## 2020-12-27 DIAGNOSIS — J30.89 ENVIRONMENTAL AND SEASONAL ALLERGIES: ICD-10-CM

## 2020-12-28 ENCOUNTER — PATIENT MESSAGE (OUTPATIENT)
Dept: NEUROLOGY | Facility: CLINIC | Age: 50
End: 2020-12-28

## 2020-12-28 DIAGNOSIS — G40.119 PARTIAL SYMPTOMATIC EPILEPSY WITH SIMPLE PARTIAL SEIZURES, INTRACTABLE, WITHOUT STATUS EPILEPTICUS (HCC): Primary | ICD-10-CM

## 2020-12-28 RX ORDER — BRIVARACETAM 50 MG/1
50 TABLET, FILM COATED ORAL 2 TIMES DAILY
Qty: 60 TABLET | Refills: 5 | Status: SHIPPED | OUTPATIENT
Start: 2020-12-28 | End: 2021-01-06

## 2020-12-28 RX ORDER — MONTELUKAST SODIUM 10 MG/1
10 TABLET ORAL NIGHTLY
Qty: 90 TABLET | Refills: 1 | Status: SHIPPED | OUTPATIENT
Start: 2020-12-28 | End: 2021-06-22

## 2020-12-28 NOTE — TELEPHONE ENCOUNTER
From: Payton Harrington MD  To: Sreedhar Raines  Sent: 12/28/2020 8:24 AM EST  Subject: medication follow up    Good morning! I wanted to see how Kalpesh is doing on onfi 5 mg twice daily and decreased dilantin. How are his seizures and jerking? How are his side effects?    Sincerely,  Payton Harrington MD  [facility]rology  [phone]

## 2020-12-30 ENCOUNTER — TELEPHONE (OUTPATIENT)
Dept: NEUROLOGY | Facility: CLINIC | Age: 50
End: 2020-12-30

## 2020-12-30 NOTE — TELEPHONE ENCOUNTER
PT'S MOTHER, FRANCISCO J KILGORE, CALLING TO CHECK ON STATUS OF BRIVIACT 50MG MEDICATION. SHE STATES THAT [employer] HAD FAXED OVER A PA REQUEST IN ORDER TO FILL PRESCRIPTION. PT IS EXPERIENCING SEIZURES WITHOUT THE MEDICATION AND NEEDS THE MEDICATION WITH THE HOLIDAY COMING UP.    PLEASE REACH OUT TO PT'S MOTHER TO CONFIRM THAT PA REQUEST WAS RECEIVED AND HAS BEEN APPROVED. HER NUMBER IS [phone].    PLEASE ADVISE.

## 2020-12-30 NOTE — TELEPHONE ENCOUNTER
Mother notified that PA has been completed and sent to plan. I will notify her as soon as insurance has sent back a determination.

## 2021-01-06 ENCOUNTER — PATIENT MESSAGE (OUTPATIENT)
Dept: NEUROLOGY | Facility: CLINIC | Age: 51
End: 2021-01-06

## 2021-01-06 DIAGNOSIS — G40.119 PARTIAL SYMPTOMATIC EPILEPSY WITH SIMPLE PARTIAL SEIZURES, INTRACTABLE, WITHOUT STATUS EPILEPTICUS (HCC): Primary | ICD-10-CM

## 2021-01-06 RX ORDER — BRIVARACETAM 25 MG/1
25 TABLET, FILM COATED ORAL 2 TIMES DAILY
Qty: 60 TABLET | Refills: 5 | Status: SHIPPED | OUTPATIENT
Start: 2021-01-06 | End: 2021-01-13 | Stop reason: SINTOL

## 2021-01-06 NOTE — TELEPHONE ENCOUNTER
From: Sreedhar Raines  To: Payton Harrington MD  Sent: 1/6/2021 8:52 AM EST  Subject: Visit Follow-Up Question    Kalpesh has been on the Briviact for seven days. He is back to his old self and no apparent side effects but the shoulder jerking continues and at times more intense.

## 2021-01-13 ENCOUNTER — OFFICE VISIT (OUTPATIENT)
Dept: FAMILY MEDICINE CLINIC | Facility: CLINIC | Age: 51
End: 2021-01-13

## 2021-01-13 VITALS
TEMPERATURE: 98.2 F | DIASTOLIC BLOOD PRESSURE: 74 MMHG | OXYGEN SATURATION: 100 % | BODY MASS INDEX: 22.16 KG/M2 | HEIGHT: 65 IN | WEIGHT: 133 LBS | HEART RATE: 79 BPM | SYSTOLIC BLOOD PRESSURE: 118 MMHG

## 2021-01-13 DIAGNOSIS — F39 MOOD DISORDER (HCC): Primary | ICD-10-CM

## 2021-01-13 DIAGNOSIS — M24.541 CONTRACTURE OF RIGHT HAND: ICD-10-CM

## 2021-01-13 DIAGNOSIS — G40.119 PARTIAL SYMPTOMATIC EPILEPSY WITH SIMPLE PARTIAL SEIZURES, INTRACTABLE, WITHOUT STATUS EPILEPTICUS (HCC): ICD-10-CM

## 2021-01-13 DIAGNOSIS — G80.2 SPASTIC HEMIPLEGIC CEREBRAL PALSY (HCC): ICD-10-CM

## 2021-01-13 PROCEDURE — 99213 OFFICE O/P EST LOW 20 MIN: CPT | Performed by: FAMILY MEDICINE
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
## 2021-01-14 ENCOUNTER — TELEPHONE (OUTPATIENT)
Dept: NEUROLOGY | Facility: CLINIC | Age: 51
End: 2021-01-14

## 2021-01-14 DIAGNOSIS — R56.9 GENERALIZED CONVULSIVE SEIZURES (HCC): Primary | ICD-10-CM

## 2021-01-14 NOTE — TELEPHONE ENCOUNTER
FRANCISCO J KILGORE (MOTHER)  [phone]    THE PT'S MOTHER CALLED IN BECAUSE SHE STATES SHE WAS MESSAGING DR JONES THROUGH MY CHART REGARDING HER SON'S HEALTH AND SHE HAS NOT HEARD ANYTHING BACK IN THE PAST COUPLE OF DAYS. THE LAST NOTE I SAW FROM THE DISCUSSION WAS FOR SOMEONE FROM THE OFFICE TO REACH OUT TO HER REGARDING IF THE PT'S INSURANCE WILL COVER AN EMU STAY. PLEASE GIVE HER A CALL TO DISCUSS THIS.

## 2021-01-15 NOTE — TELEPHONE ENCOUNTER
Spoke to pt's mother. Relayed Alexandria's information. She is okay with going forward with referral to EMU.     Please review.  Thank you

## 2021-01-19 ENCOUNTER — PATIENT MESSAGE (OUTPATIENT)
Dept: NEUROLOGY | Facility: CLINIC | Age: 51
End: 2021-01-19

## 2021-01-19 NOTE — TELEPHONE ENCOUNTER
From: Sreedhar Raines  To: Payton Harrington MD  Sent: 1/19/2021 11:18 AM EST  Subject: Non-Urgent Medical Question    Dr. Harrington just want to give you an update on Kalpesh's seizures. No shoulder/arm jerking for five days now (strange) but has had four grandmals in last six days at various times during the day.   
No

## 2021-02-03 DIAGNOSIS — G80.2 SPASTIC HEMIPLEGIC CEREBRAL PALSY (HCC): ICD-10-CM

## 2021-02-03 DIAGNOSIS — R45.1 AGITATION: ICD-10-CM

## 2021-02-03 DIAGNOSIS — F39 MOOD DISORDER (HCC): ICD-10-CM

## 2021-02-04 RX ORDER — LORAZEPAM 1 MG/1
1 TABLET ORAL EVERY 8 HOURS PRN
Qty: 60 TABLET | Refills: 0 | Status: SHIPPED | OUTPATIENT
Start: 2021-02-04 | End: 2021-06-09

## 2021-02-11 ENCOUNTER — OFFICE VISIT (OUTPATIENT)
Dept: NEUROLOGY | Facility: CLINIC | Age: 51
End: 2021-02-11

## 2021-02-11 VITALS
WEIGHT: 131 LBS | SYSTOLIC BLOOD PRESSURE: 126 MMHG | DIASTOLIC BLOOD PRESSURE: 80 MMHG | HEIGHT: 65 IN | HEART RATE: 74 BPM | BODY MASS INDEX: 21.83 KG/M2 | OXYGEN SATURATION: 99 %

## 2021-02-11 DIAGNOSIS — G40.814 INTRACTABLE LENNOX-GASTAUT SYNDROME WITHOUT STATUS EPILEPTICUS (HCC): Primary | ICD-10-CM

## 2021-02-11 DIAGNOSIS — Z46.2 ENCOUNTER FOR INTERROGATION OF NEUROSTIMULATOR: ICD-10-CM

## 2021-02-11 PROCEDURE — 95970 ALYS NPGT W/O PRGRMG: CPT | Performed by: PSYCHIATRY & NEUROLOGY

## 2021-02-11 PROCEDURE — 99214 OFFICE O/P EST MOD 30 MIN: CPT | Performed by: PSYCHIATRY & NEUROLOGY

## 2021-02-11 RX ORDER — ZONISAMIDE 100 MG/1
200 CAPSULE ORAL 2 TIMES DAILY
Qty: 120 CAPSULE | Refills: 11 | Status: SHIPPED | OUTPATIENT
Start: 2021-02-11 | End: 2021-02-15

## 2021-02-11 NOTE — PROGRESS NOTES
Subjective:     Patient ID: Sreedhar Raines is a 50 y.o. male.    Kalpesh is a 50-year-old male with history of allergies, developmental delay, intractable epilepsy status post VNS, and cerebral palsy who presents today as an established patient for follow-up for seizures.  The patient was last seen on December 14, 2020.  He was a new patient back in December 2019.  He has failed Depakote, carbon may be CPM, Keppra, and Topamax in the past.  He is currently on name brand only Dilantin 200 mg at night, lamotrigine 250 mg twice a day and senna some eyed 150 mg twice a day.  The patient had side effects to Onfi and Briviact since I last saw him.  Unfortunately his seizures have worsened.  He has had 12 generalized tonic-clonic seizures in the past month.  The magnet for his VNS use to stop the seizures but is no longer working.  His baseline frequency is 1 or 2 every 3 months.  He was having some shoulder jerking that we wanted to characterize in the epilepsy monitoring unit however that has stopped.  He had major behavior problems with Briviact.  The patient has not tried felbamate, Fycompa, Vimpat, or Epidiolex.          The following portions of the patient's history were reviewed and updated as appropriate: allergies, current medications, past family history, past medical history, past social history, past surgical history and problem list.    Review of Systems   Respiratory: Negative for cough, chest tightness and shortness of breath.    Cardiovascular: Negative for chest pain, palpitations and leg swelling.   Neurological: Positive for seizures.        Objective:    Neurologic Exam    Physical Exam   I interrogated the patient's VNS today.  They tolerated the procedure well without any adverse side effects.  Below are the current settings.        Assessment/Plan:    Kalpesh is a 50-year-old male with a history of allergies, developmental delay, intractable epilepsy status post VNS, and cerebral palsy who presents today  for follow-up.    1.  Intractable epilepsy-the patient does have multiple seizure types with developmental delay.  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  His seizures have worsened.  We decided to increase the Zonegran up to 200 mg twice a day.  Mom would like to avoid increasing Dilantin in the future.  Fycompa may be helpful but we are concerned about mood side effects.  Vimpat would also be nice however I had be concerned about drug drug interactions with other sodium channel modulators on board.  We may want to try Epidiolex.  Mom was given information about that today and seemed interested.    A total of 30 minutes of time was spent on this encounter today.  This includes chart review, face-to-face time, and documentation.       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

## 2021-02-11 NOTE — PATIENT INSTRUCTIONS
[facility]  Payton Harrington MD  Neurology clinic  [phone]    With anti-seizure medications, you may initially notice side effects of fatigue, drowsiness, unsteadiness, and dizziness.  Other possible side effects include nausea, abdominal pain, headache, blurry or double vision, slurred speech and mood changes.  Generally, patients will noticed these symptoms when the medication is first started or with higher doses and will go away with time.    It is import to consistently take your medication every day.  Missing just one dose may put you at risk for a breakthrough seizure.  Consider using reminders on your phone or a pill box.    If you develop a rash, please call the neurology clinic immediately or notify another healthcare professional, as this may be potentially life-threatening.  If you are unable to reach a healthcare professional, go to the emergency room immediately for further evaluation.    If you develop thoughts of wanting to hurt yourself or others, please call the neurology clinic immediately to notify another healthcare professional.  If you are unable to reach a healthcare professional, go to the emergency room immediately for further evaluation.    It is the [place] state law that you cannot drive within 90 days of a seizure.    You should avoid certain activities that if you were to have a seizure, you could harm yourself or others. In general, it is recommended that you avoid operating heavy machinery or power tools, swimming or taking baths by yourself (showers are ok), don't stand over open flames, don't get on high ladders or the roof.  I also recommend to avoid sleeping on your stomach.    For further information on epilepsy and resources available to patients and their families, please visit the [facility] \[facility]\"" at www.efky.org or call [phone].    **Check out the [facility] \[facility]\""'s monthly Art Group Gathering.  They are  located at [facility], [address] [place] [address].  Call Molly Hebert at [phone] or email her at [email or web address] for the dates of future gatherings.**      **If you have having memory problems, consider HOBSCOTCH (Home-Based Self-management and Cognitive Training Changes lives).  It is an 8 week self-management program for adults with epilepsy and memory problems.  The program is free at the [facility].  Contact Love Rojas at [phone] or [email or web address].**

## 2021-02-15 RX ORDER — ZONISAMIDE 100 MG/1
CAPSULE ORAL
Qty: 360 CAPSULE | Refills: 3 | Status: SHIPPED | OUTPATIENT
Start: 2021-02-15 | End: 2022-03-23

## 2021-02-19 ENCOUNTER — TELEPHONE (OUTPATIENT)
Dept: FAMILY MEDICINE CLINIC | Facility: CLINIC | Age: 51
End: 2021-02-19

## 2021-02-19 DIAGNOSIS — J30.89 ENVIRONMENTAL AND SEASONAL ALLERGIES: ICD-10-CM

## 2021-02-19 RX ORDER — LEVOCETIRIZINE DIHYDROCHLORIDE 5 MG/1
5 TABLET, FILM COATED ORAL EVERY EVENING
Qty: 90 TABLET | Refills: 1 | Status: SHIPPED | OUTPATIENT
Start: 2021-02-19 | End: 2021-08-20

## 2021-02-19 RX ORDER — LEVOCETIRIZINE DIHYDROCHLORIDE 5 MG/1
5 TABLET, FILM COATED ORAL EVERY EVENING
Qty: 90 TABLET | Refills: 1 | Status: SHIPPED | OUTPATIENT
Start: 2021-02-19 | End: 2021-02-19 | Stop reason: SDUPTHER

## 2021-02-19 NOTE — TELEPHONE ENCOUNTER
Caller: [id number] - [place], [place] - [address] OF [address] [place] [address] & [address] - [phone]  - [phone] FX    Relationship: Pharmacy    Best call back number:  [phone]    Medication needed:   Requested Prescriptions     Pending Prescriptions Disp Refills   • levocetirizine (XYZAL) 5 MG tablet 90 tablet 1     Sig: Take 1 tablet by mouth Every Evening.       When do you need the refill by: ASAP    What details did the patient provide when requesting the medication: [facility] SEND IN REFILL REQUEST, PHARMACY SAID OTHER ONE WAS ACCIDENTALLY DELETED.     Does the patient have less than a 3 day supply:  [x] Yes  [] No    What is the patient's preferred pharmacy: [facility] #[id number] - [place], [place] - [address] [address] & [address] - [phone] PH - [phone] FX

## 2021-02-24 RX ORDER — LAMOTRIGINE 100 MG/1
250 TABLET ORAL 2 TIMES DAILY
Qty: 450 TABLET | Refills: 3 | Status: SHIPPED | OUTPATIENT
Start: 2021-02-24 | End: 2022-02-16

## 2021-03-11 ENCOUNTER — LAB (OUTPATIENT)
Dept: LAB | Facility: HOSPITAL | Age: 51
End: 2021-03-11

## 2021-03-11 ENCOUNTER — OFFICE VISIT (OUTPATIENT)
Dept: NEUROLOGY | Facility: CLINIC | Age: 51
End: 2021-03-11

## 2021-03-11 VITALS
OXYGEN SATURATION: 99 % | HEART RATE: 76 BPM | SYSTOLIC BLOOD PRESSURE: 120 MMHG | DIASTOLIC BLOOD PRESSURE: 76 MMHG | BODY MASS INDEX: 21.99 KG/M2 | WEIGHT: 132 LBS | HEIGHT: 65 IN

## 2021-03-11 DIAGNOSIS — G40.814 INTRACTABLE LENNOX-GASTAUT SYNDROME WITHOUT STATUS EPILEPTICUS (HCC): Primary | ICD-10-CM

## 2021-03-11 DIAGNOSIS — Z79.899 HIGH RISK MEDICATION USE: ICD-10-CM

## 2021-03-11 DIAGNOSIS — G40.814 INTRACTABLE LENNOX-GASTAUT SYNDROME WITHOUT STATUS EPILEPTICUS (HCC): ICD-10-CM

## 2021-03-11 LAB
ALT SERPL W P-5'-P-CCNC: 11 U/L (ref 1–41)
AST SERPL-CCNC: 11 U/L (ref 1–40)
BILIRUB SERPL-MCNC: <0.2 MG/DL (ref 0–1.2)

## 2021-03-11 PROCEDURE — 84450 TRANSFERASE (AST) (SGOT): CPT | Performed by: PSYCHIATRY & NEUROLOGY

## 2021-03-11 PROCEDURE — 36415 COLL VENOUS BLD VENIPUNCTURE: CPT | Performed by: PSYCHIATRY & NEUROLOGY

## 2021-03-11 PROCEDURE — 82247 BILIRUBIN TOTAL: CPT

## 2021-03-11 PROCEDURE — 84460 ALANINE AMINO (ALT) (SGPT): CPT | Performed by: PSYCHIATRY & NEUROLOGY

## 2021-03-11 PROCEDURE — 99214 OFFICE O/P EST MOD 30 MIN: CPT | Performed by: PSYCHIATRY & NEUROLOGY

## 2021-03-11 PROCEDURE — 95970 ALYS NPGT W/O PRGRMG: CPT | Performed by: PSYCHIATRY & NEUROLOGY

## 2021-03-11 NOTE — PROGRESS NOTES
Subjective:     Patient ID: Sreedhar Raines is a 50 y.o. male.    Kalpesh is a 50-year-old male with history of allergies, developmental delay, intractable epilepsy status post VNS and cerebral palsy who presents to the neurology clinic today as an established patient for follow-up for seizures.  The patient was last seen on February 11, 2021.  He was a new patient back in December 2019.  The patient failed Depakote, carbamazepine, Keppra, and Topamax.  He is currently on name brand only Dilantin 200 mg at night, lamotrigine 250 mg twice a day, and Zonegran 200 mg twice a day.  He had side effects to Briviact and Onfi.  Due to multiple seizures types an epileptic encephalopathy is suspected like Lennox-Gastaut syndrome.  Since I last saw him he had 4 seizures in 4 weeks.  These are generalized tonic-clonic seizures.  He is also having some sporadic shoulder jerking that is a little better.  Swiping the magnet may have worked once.  All the seizures last less than 5 minutes.  They deny any side effects to the increase in Zonegran.    The following portions of the patient's history were reviewed and updated as appropriate: allergies, current medications, past family history, past medical history, past social history, past surgical history and problem list.    Review of Systems   Respiratory: Negative for cough, chest tightness and shortness of breath.    Cardiovascular: Negative for chest pain, palpitations and leg swelling.   Neurological: Positive for seizures.        Objective:    Neurologic Exam    Physical Exam     I interrogated the patient's VNS today.  They tolerated the procedure well without any adverse side effects.  Below are the current settings.        Assessment/Plan:    Kalpesh is a 50-year-old male with history of allergies, developmental delay, intractable epilepsy status post VNS and cerebral palsy who presents today for follow-up.    1.  Intractable Lennox-Gastaut syndrome-unfortunately the patient  continues to have a lot of seizures.  Mom is interested in trying Epidiolex.  We will get liver enzymes and total bilirubin at baseline and check at 1 months, 3 months, and 6 months.  We discussed side effects including drowsiness, diarrhea, and weight loss.  I recommend starting at 1.5 mL twice a day for a week and then going up to 3 mL twice a day.  Mom is to let us know how he does after 2 weeks.  After that, we may want to increase further to 4.5 mL twice a day for a week and then a maximum of 6 mL twice a day.  We will see the patient back in 6 weeks or sooner if needed.    A total of 30 minutes of time was spent on this encounter today.  This included reviewing the patient's records, face-to-face time, and documentation.       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

## 2021-03-11 NOTE — PATIENT INSTRUCTIONS
Start Epidiolex at 1.5 mL twice daily for a week.  Then increase to 3 mL twice daily for a week.  After being on the medication for 2 weeks, call and give update.

## 2021-03-12 ENCOUNTER — TELEPHONE (OUTPATIENT)
Dept: NEUROLOGY | Facility: CLINIC | Age: 51
End: 2021-03-12

## 2021-03-12 NOTE — TELEPHONE ENCOUNTER
Provider:   Caller: FRANCISCO J  Relationship to Patient: MOTHER  Pharmacy: [id number]  Phone Number: [phone]    Reason for Call: PT'S MOTHER CALLED IN STATING THE PHARMACY NEVER RECEIVED THE PRESCRIPTION FOR Epidiolex at 1.5 mL twice daily for a week.  Then increase to 3 mL twice daily for a week., PLEASE REVIEW AND ADVISE.    THANK YOU.

## 2021-03-22 NOTE — TELEPHONE ENCOUNTER
Provider: DR JONES    Caller: FRANCISCO J KILGORE    Relationship to Patient: MOTHER    Pharmacy: [facility]    Phone Number: [phone]    Reason for Call: THE PT'S MOTHER CALLED IN TODAY BECAUSE SHE RECEIVED A LETTER FROM [facility] STATING THAT EITHER SHE HAS TO SUBMIT AN ONLINE ORDER OR SHE NEEDS DR JONES TO SEND IN A PRESCRIPTION BEFORE THEY CAN FILL THE SCRIPT. SHE IS WANTING TO SPEAK TO BRUCE REGARDING THIS. PLEASE GIVE HER A CALL BACK TO DISCUSS THIS.

## 2021-03-23 RX ORDER — PHENYTOIN SODIUM 100 MG/1
200 CAPSULE, EXTENDED RELEASE ORAL DAILY
Qty: 60 CAPSULE | Refills: 5 | Status: SHIPPED | OUTPATIENT
Start: 2021-03-23 | End: 2021-12-21

## 2021-03-25 NOTE — TELEPHONE ENCOUNTER
.DELETE AFTER REVIEWING: Telephone encounter to be sent to the  pool     Caller: KILGOREFRANCISCO J    Relationship: Mother    Best call back number: [phone]    What is the best time to reach you: ANYTIME TODAY     Who are you requesting to speak with (clinical staff, provider,  specific staff member): MASON    Do you know the name of the person who called:  BRUCE    What was the call regarding:  RX  FOR EPIDILEX CALLER STATES SHE CHECKED WITH PHARMACY ORDER IS STILL NOT BEEN SENT OVER  TO [place] [id number] LISTED  CALLER STATES THEY HAVE BEEN WAITING FOR TWO WEEKS FOR THIS MEDICATION  PT NEEDS TO START THIS MEDICATION     Do you require a callback:YES       PLEASE ADVISE

## 2021-03-25 NOTE — TELEPHONE ENCOUNTER
Spoke to patient's mother. [facility]in states they do not dispense medication. We are currently both looking for alternative pharmacies.

## 2021-03-28 DIAGNOSIS — F39 MOOD DISORDER (HCC): ICD-10-CM

## 2021-03-28 DIAGNOSIS — R45.1 AGITATION: ICD-10-CM

## 2021-03-28 RX ORDER — RISPERIDONE 0.5 MG/1
0.5 TABLET ORAL NIGHTLY
Qty: 30 TABLET | Refills: 5 | Status: SHIPPED | OUTPATIENT
Start: 2021-03-28 | End: 2021-10-04

## 2021-03-31 NOTE — TELEPHONE ENCOUNTER
Attempted to call patient's mother. No answer. LM letting her know that the ppw was received and we will get it signed and faxed back today.

## 2021-03-31 NOTE — TELEPHONE ENCOUNTER
Provider: KAREN  Caller: FRANCISCO J KILGORE  Relationship to Patient: MOTHER  Phone Number: [phone]  Reason for Call: HAS FAX FROM [facility] BEEN RECEIVED RE: CO-PAY ASSISTANCE? NEEDS TO BE DONE QUICKLY.    PLEASE CALL & ADVISE.    THANK YOU.

## 2021-04-13 ENCOUNTER — TELEPHONE (OUTPATIENT)
Dept: NEUROLOGY | Facility: CLINIC | Age: 51
End: 2021-04-13

## 2021-04-13 NOTE — TELEPHONE ENCOUNTER
Caller: FRANCISCO J KILGORE    Relationship to patient: SELF    Best call back number: [phone]    Patient is needing: PT'S MOTHER RETURNING BRUCE'S PHONE CALL TO DISCUSS RESCHEDULING. UNABLE TO WARM TRANSFER AS BRUCE WAS ROOMING A NP AT THE TIME OF FRANCISCO J'S CALL.    PLEASE CONTACT PT AT EARLIEST CONVENIENCE.

## 2021-04-13 NOTE — TELEPHONE ENCOUNTER
Attempted to call patient's mother back. No answer. Left msg for her to return my call to discuss and reschedule.

## 2021-04-13 NOTE — TELEPHONE ENCOUNTER
Provider: DR JONES    Caller: FRANCISCO J    Relationship to Patient: MOTHER    Phone Number: [phone]    Reason for Call: THE PT'S MOTHER CALLED IN TODAY BECAUSE SHE WANTED TO LEAVE A MESSAGE FOR BRUCE. SHE WANTED TO SEE IF SHE SHOULD GO AHEAD AND KEEP HER SON'S APPT FOR 4/21 OR IF SHE SHOULD GO AHEAD AND SCHEDULE ANOTHER APPT DUE TO THE FACT THAT THE PT HAS NOT RECEIVE HIS [employer] CARD FOR THE EPIDIOLEX. PLEASE GIVE THEM A CALL TO DISCUSS THIS WITH HER.

## 2021-04-13 NOTE — TELEPHONE ENCOUNTER
Please review and advise. Patient has been waiting on [facility] to start epiodiolex. They are hoping to get the card and get it started with in the next week or so. Patient's mother would like to know if they should move out their 4/21 follow up about 6 weeks since he has been unable to start it yet?    Thank you

## 2021-05-07 ENCOUNTER — PATIENT MESSAGE (OUTPATIENT)
Dept: NEUROLOGY | Facility: CLINIC | Age: 51
End: 2021-05-07

## 2021-05-10 RX ORDER — CANNABIDIOL 100 MG/ML
4.5 SOLUTION ORAL 2 TIMES DAILY
Qty: 270 ML | Refills: 5 | Status: SHIPPED | OUTPATIENT
Start: 2021-05-10 | End: 2021-05-10 | Stop reason: SDUPTHER

## 2021-05-10 RX ORDER — CANNABIDIOL 100 MG/ML
4.5 SOLUTION ORAL 2 TIMES DAILY
Qty: 270 ML | Refills: 5 | Status: SHIPPED | OUTPATIENT
Start: 2021-05-10 | End: 2021-09-09

## 2021-05-10 NOTE — TELEPHONE ENCOUNTER
Medication was sent to [facility] pharmacy. Pharmacy updated to [facility] specialty pharmacy in [place].     Please review.   Thank you

## 2021-06-02 ENCOUNTER — LAB (OUTPATIENT)
Dept: LAB | Facility: HOSPITAL | Age: 51
End: 2021-06-02

## 2021-06-02 ENCOUNTER — OFFICE VISIT (OUTPATIENT)
Dept: NEUROLOGY | Facility: CLINIC | Age: 51
End: 2021-06-02

## 2021-06-02 VITALS
HEART RATE: 66 BPM | HEIGHT: 65 IN | DIASTOLIC BLOOD PRESSURE: 82 MMHG | WEIGHT: 131 LBS | OXYGEN SATURATION: 99 % | SYSTOLIC BLOOD PRESSURE: 120 MMHG | BODY MASS INDEX: 21.83 KG/M2

## 2021-06-02 DIAGNOSIS — Z79.899 HIGH RISK MEDICATION USE: ICD-10-CM

## 2021-06-02 DIAGNOSIS — G40.814 INTRACTABLE LENNOX-GASTAUT SYNDROME WITHOUT STATUS EPILEPTICUS (HCC): Primary | ICD-10-CM

## 2021-06-02 DIAGNOSIS — G40.814 INTRACTABLE LENNOX-GASTAUT SYNDROME WITHOUT STATUS EPILEPTICUS (HCC): ICD-10-CM

## 2021-06-02 DIAGNOSIS — Z46.2 ENCOUNTER FOR INTERROGATION OF NEUROSTIMULATOR: ICD-10-CM

## 2021-06-02 LAB
ALT SERPL W P-5'-P-CCNC: 17 U/L (ref 1–41)
AST SERPL-CCNC: 14 U/L (ref 1–40)
BILIRUB SERPL-MCNC: 0.3 MG/DL (ref 0–1.2)

## 2021-06-02 PROCEDURE — 95970 ALYS NPGT W/O PRGRMG: CPT | Performed by: PSYCHIATRY & NEUROLOGY

## 2021-06-02 PROCEDURE — 36415 COLL VENOUS BLD VENIPUNCTURE: CPT | Performed by: PSYCHIATRY & NEUROLOGY

## 2021-06-02 PROCEDURE — 84460 ALANINE AMINO (ALT) (SGPT): CPT | Performed by: PSYCHIATRY & NEUROLOGY

## 2021-06-02 PROCEDURE — 99214 OFFICE O/P EST MOD 30 MIN: CPT | Performed by: PSYCHIATRY & NEUROLOGY

## 2021-06-02 PROCEDURE — 82247 BILIRUBIN TOTAL: CPT

## 2021-06-02 PROCEDURE — 84450 TRANSFERASE (AST) (SGOT): CPT | Performed by: PSYCHIATRY & NEUROLOGY

## 2021-06-02 NOTE — PATIENT INSTRUCTIONS
[facility]  Payton Harrington MD  Neurology clinic  [phone]    With anti-seizure medications, you may initially notice side effects of fatigue, drowsiness, unsteadiness, and dizziness.  Other possible side effects include nausea, abdominal pain, headache, blurry or double vision, slurred speech and mood changes.  Generally, patients will noticed these symptoms when the medication is first started or with higher doses and will go away with time.    It is import to consistently take your medication every day.  Missing just one dose may put you at risk for a breakthrough seizure.  Consider using reminders on your phone or a pill box.    If you develop a rash, please call the neurology clinic immediately or notify another healthcare professional, as this may be potentially life-threatening.  If you are unable to reach a healthcare professional, go to the emergency room immediately for further evaluation.    If you develop thoughts of wanting to hurt yourself or others, please call the neurology clinic immediately to notify another healthcare professional.  If you are unable to reach a healthcare professional, go to the emergency room immediately for further evaluation.    It is the [place] state law that you cannot drive within 90 days of a seizure.    You should avoid certain activities that if you were to have a seizure, you could harm yourself or others. In general, it is recommended that you avoid operating heavy machinery or power tools, swimming or taking baths by yourself (showers are ok), don't stand over open flames, don't get on high ladders or the roof.  I also recommend to avoid sleeping on your stomach.    For further information on epilepsy and resources available to patients and their families, please visit the [facility] at www.efky.org or call [phone].    **Check out the [facility]'s monthly Art Group Gathering.  They are  located at [facility], [address] [place] [address].  Call Molly Hebert at [phone] or email her at [email or web address] for the dates of future gatherings.**      **If you have having memory problems, consider HOBSCOTCH (Home-Based Self-management and Cognitive Training Changes lives).  It is an 8 week self-management program for adults with epilepsy and memory problems.  The program is free at the [facility].  Contact Love Rojas at [phone] or [email or web address].**

## 2021-06-02 NOTE — PROGRESS NOTES
Subjective:     Patient ID: Sreedhar Raines is a 50 y.o. male.    Kalpesh is a 50-year-old male with history of allergies, developmental delay, intractable epilepsy status post VNS and cerebral palsy who presents to the neurology clinic today as an established patient for follow-up for seizures.  The patient was last seen on March 11, 2021.  He is a new patient back in December 2019.  The patient has failed Depakote, carbamazepine, Keppra, Topamax, Briviact, and Onfi.  He is currently on name brand only Dilantin 200 mg at night, lamotrigine 250 mg twice a day and Zonegran 200 mg twice a day.  The patient started Epidiolex in the end of April.  Since starting the medication he is only had 2 seizures.  He typically averages 3-5 seizures a month.  He has been on the higher dose of 4.5 mL for the past 3 weeks.  He had one episode of diarrhea.  He may be acting a little more spacey.  His last seizure was on May 24.  He had a generalized tonic-clonic seizure however swiping the magnet stop the seizure and he had less of a postictal period.  His shoulder jerking stopped however sometimes he still feels it.  Mom is very comfortable where he is right now.  She feels like he is so much better and is resting better at night as well.  At his last visit we did check baseline liver enzymes that were normal.    The following portions of the patient's history were reviewed and updated as appropriate: allergies, current medications, past family history, past medical history, past social history, past surgical history and problem list.    Review of Systems     Objective:    Neurologic Exam    Physical Exam   I interrogated the patient's VNS today.  They tolerated the procedure well without any adverse side effects.  Below are the current settings.        Assessment/Plan:    Kalpesh is a 50-year-old male with history of allergies, developmental delay, intractable epilepsy status post VNS and cerebral palsy who presents today for  follow-up.    1.  Intractable Lennox-Gastaut syndrome status post VNS-fortunately his seizures are much improved.  Mom is satisfied with his current regimen.  I do not recommend making any changes for now.  I would like to check his liver enzymes at this visit and at his next follow-up visit in 3 months and then we need to check them again in 6 months.  In the future we could optimize Epidiolex to a total of 6 mL twice a day if needed.  I will see the patient back in 3 months time or sooner if he is having any issues.    A total of 30 minutes of time was spent on this encounter today.  This includes reviewing the patient's records, face-to-face time, and documentation.       Problems Addressed this Visit        Neuro    Epilepsy (CMS/HCC) - Primary    Relevant Orders    ALT    AST    Bilirubin, Total    Encounter for interrogation of neurostimulator      Other Visit Diagnoses     High risk medication use        Relevant Orders    ALT    AST    Bilirubin, Total      Diagnoses       Codes Comments    Intractable Lennox-Gastaut syndrome without status epilepticus (CMS/HCC)    -  Primary ICD-10-CM: G40.814  ICD-9-CM: 345.10     High risk medication use     ICD-10-CM: Z79.899  ICD-9-CM: V58.69     Encounter for interrogation of neurostimulator     ICD-10-CM: Z46.2  ICD-9-CM: V53.02

## 2021-06-08 DIAGNOSIS — G80.2 SPASTIC HEMIPLEGIC CEREBRAL PALSY (HCC): ICD-10-CM

## 2021-06-08 DIAGNOSIS — F39 MOOD DISORDER (HCC): ICD-10-CM

## 2021-06-08 DIAGNOSIS — R45.1 AGITATION: ICD-10-CM

## 2021-06-09 RX ORDER — LORAZEPAM 1 MG/1
1 TABLET ORAL EVERY 8 HOURS PRN
Qty: 60 TABLET | Refills: 0 | Status: SHIPPED | OUTPATIENT
Start: 2021-06-09 | End: 2021-12-17

## 2021-06-21 DIAGNOSIS — J30.89 ENVIRONMENTAL AND SEASONAL ALLERGIES: ICD-10-CM

## 2021-06-22 RX ORDER — MONTELUKAST SODIUM 10 MG/1
10 TABLET ORAL NIGHTLY
Qty: 90 TABLET | Refills: 1 | Status: SHIPPED | OUTPATIENT
Start: 2021-06-22 | End: 2021-12-21

## 2021-08-10 ENCOUNTER — PATIENT MESSAGE (OUTPATIENT)
Dept: FAMILY MEDICINE CLINIC | Facility: CLINIC | Age: 51
End: 2021-08-10

## 2021-08-10 ENCOUNTER — HOSPITAL ENCOUNTER (OUTPATIENT)
Dept: GENERAL RADIOLOGY | Facility: HOSPITAL | Age: 51
Discharge: HOME | End: 2021-08-10

## 2021-08-10 ENCOUNTER — OFFICE VISIT (OUTPATIENT)
Dept: FAMILY MEDICINE CLINIC | Facility: CLINIC | Age: 51
End: 2021-08-10

## 2021-08-10 ENCOUNTER — LAB (OUTPATIENT)
Dept: FAMILY MEDICINE CLINIC | Facility: CLINIC | Age: 51
End: 2021-08-10

## 2021-08-10 VITALS
HEART RATE: 71 BPM | SYSTOLIC BLOOD PRESSURE: 153 MMHG | BODY MASS INDEX: 21.99 KG/M2 | TEMPERATURE: 97.3 F | HEIGHT: 65 IN | OXYGEN SATURATION: 99 % | DIASTOLIC BLOOD PRESSURE: 79 MMHG | WEIGHT: 132 LBS

## 2021-08-10 DIAGNOSIS — R10.32 LLQ ABDOMINAL PAIN: ICD-10-CM

## 2021-08-10 DIAGNOSIS — R10.32 LLQ ABDOMINAL PAIN: Primary | ICD-10-CM

## 2021-08-10 DIAGNOSIS — K59.00 CONSTIPATION, UNSPECIFIED CONSTIPATION TYPE: Primary | ICD-10-CM

## 2021-08-10 DIAGNOSIS — Z12.11 ENCOUNTER FOR SCREENING FOR MALIGNANT NEOPLASM OF COLON: ICD-10-CM

## 2021-08-10 DIAGNOSIS — G80.2 SPASTIC HEMIPLEGIC CEREBRAL PALSY (HCC): ICD-10-CM

## 2021-08-10 LAB
ALBUMIN SERPL-MCNC: 4.5 G/DL (ref 3.5–5.2)
ALBUMIN/GLOB SERPL: 1.5 G/DL
ALP SERPL-CCNC: 67 U/L (ref 39–117)
ALT SERPL W P-5'-P-CCNC: 15 U/L (ref 1–41)
ANION GAP SERPL CALCULATED.3IONS-SCNC: 13 MMOL/L (ref 5–15)
AST SERPL-CCNC: 15 U/L (ref 1–40)
BILIRUB SERPL-MCNC: 0.3 MG/DL (ref 0–1.2)
BUN SERPL-MCNC: 13 MG/DL (ref 6–20)
BUN/CREAT SERPL: 8.1 (ref 7–25)
CALCIUM SPEC-SCNC: 9.1 MG/DL (ref 8.6–10.5)
CHLORIDE SERPL-SCNC: 103 MMOL/L (ref 98–107)
CO2 SERPL-SCNC: 22 MMOL/L (ref 22–29)
CREAT SERPL-MCNC: 1.6 MG/DL (ref 0.76–1.27)
CRP SERPL-MCNC: 4.33 MG/DL (ref 0–0.5)
GFR SERPL CREATININE-BSD FRML MDRD: 46 ML/MIN/1.73
GLOBULIN UR ELPH-MCNC: 3.1 GM/DL
GLUCOSE SERPL-MCNC: 89 MG/DL (ref 65–99)
POTASSIUM SERPL-SCNC: 3.7 MMOL/L (ref 3.5–5.2)
PROT SERPL-MCNC: 7.6 G/DL (ref 6–8.5)
SODIUM SERPL-SCNC: 138 MMOL/L (ref 136–145)

## 2021-08-10 PROCEDURE — 74018 RADEX ABDOMEN 1 VIEW: CPT

## 2021-08-10 PROCEDURE — 87086 URINE CULTURE/COLONY COUNT: CPT | Performed by: FAMILY MEDICINE

## 2021-08-10 PROCEDURE — 86140 C-REACTIVE PROTEIN: CPT | Performed by: FAMILY MEDICINE

## 2021-08-10 PROCEDURE — 36415 COLL VENOUS BLD VENIPUNCTURE: CPT | Performed by: FAMILY MEDICINE

## 2021-08-10 PROCEDURE — 81001 URINALYSIS AUTO W/SCOPE: CPT | Performed by: FAMILY MEDICINE

## 2021-08-10 PROCEDURE — 73502 X-RAY EXAM HIP UNI 2-3 VIEWS: CPT

## 2021-08-10 PROCEDURE — 80053 COMPREHEN METABOLIC PANEL: CPT | Performed by: FAMILY MEDICINE

## 2021-08-10 PROCEDURE — 99213 OFFICE O/P EST LOW 20 MIN: CPT | Performed by: FAMILY MEDICINE

## 2021-08-10 RX ORDER — POLYETHYLENE GLYCOL 3350 17 G/17G
17 POWDER, FOR SOLUTION ORAL DAILY
Qty: 850 G | Refills: 1 | Status: SHIPPED | OUTPATIENT
Start: 2021-08-10

## 2021-08-10 RX ORDER — DICYCLOMINE HYDROCHLORIDE 10 MG/1
10 CAPSULE ORAL 3 TIMES DAILY PRN
Qty: 30 CAPSULE | Refills: 0 | Status: SHIPPED | OUTPATIENT
Start: 2021-08-10 | End: 2023-02-08

## 2021-08-10 NOTE — TELEPHONE ENCOUNTER
From: Sreedhar Raines  To: Concepción Sandra MD  Sent: 8/10/2021 5:27 PM EDT  Subject: Prescription Question    Is the miralax OTC? Please send escript to [facility].

## 2021-08-10 NOTE — PROGRESS NOTES
Subjective:     Sreedhar Raines is a 51 y.o. male who presents for  Chief Complaint   Patient presents with   • Abdominal Pain     6 days. getting worse since Sunday. lower left close to belly button      Present with mother who provides history.      male with CMHx of CP & epilepsy presents with complaints of LLQ abdominal pain, left of the umbilicus, for the last week. Patient has a high pain tolerance but approached mother last week with concerns. Mother reports diminished appetite though increased burping. Normally has one BM per day. Had an episode of diarrhea last week and episodic constipation that improved with greens. Exacerbated by flexion and weight bearing on left foot though he did shout out in pain while supine a few nights ago. No therapy initiated at home. Denies any recent travel or new foods. Denies any associated fever.     Past Medical Hx:  Past Medical History:   Diagnosis Date   • Cerebral palsy (CMS/HCC)    • Hiatal hernia    • Mood disorder (CMS/HCC)    • Seizures (CMS/HCC)        Past Surgical Hx:  Past Surgical History:   Procedure Laterality Date   • FOOT SURGERY Right    • PLANTAR FASCIA SURGERY Left    • VAGUS NERVE STIMULATOR IMPLANTATION         Family Hx:  Family History   Problem Relation Age of Onset   • Hypertension Mother    • Hyperlipidemia Mother    • Cancer Father         stomach   • Diabetes Maternal Uncle    • Cancer Maternal Grandmother         multiple myeloma   • Dementia Maternal Grandmother    • Hyperlipidemia Maternal Grandmother    • Alzheimer's disease Paternal Grandfather 70        Social History:  Social History     Socioeconomic History   • Marital status:      Spouse name: Not on file   • Number of children: Not on file   • Years of education: Not on file   • Highest education level: Not on file   Tobacco Use   • Smoking status: Never Smoker   • Smokeless tobacco: Never Used   Vaping Use   • Vaping Use: Never used   Substance and Sexual Activity   •  "Alcohol use: Never   • Drug use: Never   • Sexual activity: Defer       Allergies:  Briviact [brivaracetam], Clobazam, Depakote [valproic acid], and Tegretol [carbamazepine]    Current Meds:    Current Outpatient Medications:   •  Cannabidiol (Epidiolex) 100 MG/ML solution, Take 4.5 mL by mouth 2 (two) times a day., Disp: 270 mL, Rfl: 5  •  Dilantin 100 MG ER capsule, Take 2 capsules by mouth Daily., Disp: 60 capsule, Rfl: 5  •  lamoTRIgine (LaMICtal) 100 MG tablet, Take 2.5 tablets by mouth 2 (Two) Times a Day., Disp: 450 tablet, Rfl: 3  •  levocetirizine (XYZAL) 5 MG tablet, Take 1 tablet by mouth Every Evening., Disp: 90 tablet, Rfl: 1  •  LORazepam (ATIVAN) 1 MG tablet, Take 1 tablet by mouth Every 8 (Eight) Hours As Needed for Anxiety (Agitation)., Disp: 60 tablet, Rfl: 0  •  montelukast (SINGULAIR) 10 MG tablet, Take 1 tablet by mouth Every Night., Disp: 90 tablet, Rfl: 1  •  risperiDONE (risperDAL) 0.5 MG tablet, TAKE 1 TABLET BY MOUTH EVERY NIGHT, Disp: 30 tablet, Rfl: 5  •  zonisamide (ZONEGRAN) 100 MG capsule, TAKE 2 CAPSULES BY MOUTH TWICE DAILY, Disp: 360 capsule, Rfl: 3    The following portions of the patient's history were reviewed and updated as appropriate: allergies, current medications, past family history, past medical history, past social history, past surgical history and problem list.    Review of Systems  Review of Systems   Unable to perform ROS: Acuity of condition       Objective:     /79 (BP Location: Left arm, Patient Position: Sitting, Cuff Size: Small Adult)   Pulse 71   Temp 97.3 °F (36.3 °C) (Infrared)   Ht 165.1 cm (65\")   Wt 59.9 kg (132 lb)   SpO2 99%   BMI 21.97 kg/m²       Physical Exam   Constitutional: He appears well-developed and well-nourished. No distress.   Normally talkative.  Reserved today.   HENT:   Head: Normocephalic and atraumatic.   Cardiovascular: Normal rate, regular rhythm and normal heart sounds.   Pulmonary/Chest: Effort normal and breath sounds " normal. He has no wheezes.   Abdominal: Soft. Bowel sounds are normal. There is no abdominal tenderness.   Musculoskeletal: Deformity (right wrist & elbow contracture) present.      Right hip: He exhibits normal range of motion and normal strength.      Left hip: He exhibits normal range of motion and normal strength.      Comments: Right wrist drop.   Right foot smaller than left foot.   Neurological: He is alert.   Skin: Skin is warm and dry. He is not diaphoretic.   Psychiatric: His speech is delayed. He is slowed.   Vitals reviewed.    Lab Results   Component Value Date    WBC 4.46 03/24/2020    HGB 13.3 03/24/2020    HCT 38.2 03/24/2020    MCV 92.0 03/24/2020     03/24/2020     Lab Results   Component Value Date    GLUCOSE 83 03/24/2020    BUN 11 03/24/2020    CREATININE 1.00 03/24/2020    EGFRIFNONA 80 03/24/2020    BCR 11.0 03/24/2020    K 3.8 03/24/2020    CO2 24.7 03/24/2020    CALCIUM 9.3 03/24/2020    ALBUMIN 4.40 03/24/2020    AST 14 06/02/2021    ALT 17 06/02/2021        Assessment/Plan:     Problem List Items Addressed This Visit        Neuro    Spastic hemiplegic cerebral palsy (CMS/HCC)    Overview     Right sided hemiplegia.  Under the full time care of mother, guardian.  Followed by neurology for associated seizures.           Other Visit Diagnoses     LLQ abdominal pain    -  Primary    GI vs MSK etiology. Difficult to acertain given patient's underlying CP.  Will obtain labs & imaging.  Consider antispasmodic vs NSAIDs.    Relevant Orders    XR Abdomen KUB    XR Hip With or Without Pelvis 2 - 3 View Left    Urinalysis With Culture If Indicated - Urine, Clean Catch    C-reactive protein    Comprehensive metabolic panel    Encounter for screening for malignant neoplasm of colon        Relevant Orders    Ambulatory Referral For Screening Colonoscopy (Completed)        Follow-up:     Return in about 4 weeks (around 9/7/2021) for Medicare Wellness.      Signature    Concepción Sandra MD  [facility]  [facility]

## 2021-08-11 ENCOUNTER — PATIENT MESSAGE (OUTPATIENT)
Dept: FAMILY MEDICINE CLINIC | Facility: CLINIC | Age: 51
End: 2021-08-11

## 2021-08-11 DIAGNOSIS — K59.00 CONSTIPATION, UNSPECIFIED CONSTIPATION TYPE: Primary | ICD-10-CM

## 2021-08-11 LAB
BACTERIA UR QL AUTO: ABNORMAL /HPF
BILIRUB UR QL STRIP: NEGATIVE
CLARITY UR: CLEAR
COD CRY URNS QL: ABNORMAL /HPF
COLOR UR: YELLOW
GLUCOSE UR STRIP-MCNC: NEGATIVE MG/DL
HGB UR QL STRIP.AUTO: NEGATIVE
HYALINE CASTS UR QL AUTO: ABNORMAL /LPF
KETONES UR QL STRIP: NEGATIVE
LEUKOCYTE ESTERASE UR QL STRIP.AUTO: ABNORMAL
NITRITE UR QL STRIP: NEGATIVE
PH UR STRIP.AUTO: 6.5 [PH] (ref 5–8)
PROT UR QL STRIP: ABNORMAL
RBC # UR: ABNORMAL /HPF
REF LAB TEST METHOD: ABNORMAL
SP GR UR STRIP: 1.02 (ref 1–1.03)
SQUAMOUS #/AREA URNS HPF: ABNORMAL /HPF
UROBILINOGEN UR QL STRIP: ABNORMAL
WBC UR QL AUTO: ABNORMAL /HPF

## 2021-08-12 ENCOUNTER — TELEPHONE (OUTPATIENT)
Dept: FAMILY MEDICINE CLINIC | Facility: CLINIC | Age: 51
End: 2021-08-12

## 2021-08-12 DIAGNOSIS — R10.32 LLQ ABDOMINAL PAIN: ICD-10-CM

## 2021-08-12 DIAGNOSIS — R82.998 CALCIUM OXALATE CRYSTALS IN URINE: Primary | ICD-10-CM

## 2021-08-12 LAB — BACTERIA SPEC AEROBE CULT: NO GROWTH

## 2021-08-12 NOTE — TELEPHONE ENCOUNTER
Caller: FRANCISCO J KILGORE    Relationship: Mother    Best call back number: [phone]    What is the best time to reach you: ANY     Who are you requesting to speak with (clinical staff, provider,  specific staff member): N/A    What was the call regarding: PATIENTS MOTHER WOULD LIKE TO KNOW IF THE CT SCAN OF PATIENTS ABDOMEN HAS BEEN APPROVED YET AND IF SO WHEN SHE WILL HEAR FROM SOMEONE TO HAVE IT SCHEDULED     Do you require a callback: YES

## 2021-08-13 ENCOUNTER — PATIENT MESSAGE (OUTPATIENT)
Dept: FAMILY MEDICINE CLINIC | Facility: CLINIC | Age: 51
End: 2021-08-13

## 2021-08-13 RX ORDER — LACTULOSE 10 G/15ML
20 SOLUTION ORAL 2 TIMES DAILY PRN
Qty: 473 ML | Refills: 1 | Status: SHIPPED | OUTPATIENT
Start: 2021-08-13

## 2021-08-13 RX ORDER — TAMSULOSIN HYDROCHLORIDE 0.4 MG/1
1 CAPSULE ORAL DAILY
Qty: 30 CAPSULE | Refills: 0 | Status: SHIPPED | OUTPATIENT
Start: 2021-08-13 | End: 2021-09-15 | Stop reason: SDUPTHER

## 2021-08-13 NOTE — TELEPHONE ENCOUNTER
From: Sreedhar Raines  To: Concepción Sandra MD  Sent: 8/11/2021 5:28 PM EDT  Subject: Test Results Question    Dr. Beebe I am concerned about Kalpesh's lab results. Would you please explain what these two high results mean. Thank you.

## 2021-08-13 NOTE — TELEPHONE ENCOUNTER
From: Sreedhar Raines  To: Concepción Sandra MD  Sent: 8/10/2021 9:40 PM EDT  Subject: Test Results Question    What does this mean? Is this blood or urine?

## 2021-08-13 NOTE — TELEPHONE ENCOUNTER
From: Sreedhar Raines  To: Concepción Sandra MD  Sent: 8/13/2021 12:34 PM EDT  Subject: Prescription Question    Pharmacy has one prescription ready which is for his bowels. Are you also sending script for urinary tract? I only want to make one trip to [facility]. Ty

## 2021-08-16 RX ORDER — TAMSULOSIN HYDROCHLORIDE 0.4 MG/1
1 CAPSULE ORAL DAILY
Qty: 90 CAPSULE | OUTPATIENT
Start: 2021-08-16

## 2021-08-16 NOTE — TELEPHONE ENCOUNTER
Please call patient's mother and let her know that we received a 90-day refill request for tamsulosin.  I do not anticipate him needing the medication for longer than 30 days if he does not fact have a stone small enough that he can pass on his own.

## 2021-08-17 ENCOUNTER — APPOINTMENT (OUTPATIENT)
Dept: CT IMAGING | Facility: HOSPITAL | Age: 51
End: 2021-08-17

## 2021-08-19 ENCOUNTER — HOSPITAL ENCOUNTER (OUTPATIENT)
Dept: CT IMAGING | Facility: HOSPITAL | Age: 51
Discharge: HOME | End: 2021-08-19
Admitting: FAMILY MEDICINE

## 2021-08-19 DIAGNOSIS — R82.998 CALCIUM OXALATE CRYSTALS IN URINE: ICD-10-CM

## 2021-08-19 DIAGNOSIS — R10.32 LLQ ABDOMINAL PAIN: ICD-10-CM

## 2021-08-19 PROCEDURE — 74176 CT ABD & PELVIS W/O CONTRAST: CPT

## 2021-08-20 ENCOUNTER — PATIENT MESSAGE (OUTPATIENT)
Dept: FAMILY MEDICINE CLINIC | Facility: CLINIC | Age: 51
End: 2021-08-20

## 2021-08-20 DIAGNOSIS — J30.89 ENVIRONMENTAL AND SEASONAL ALLERGIES: ICD-10-CM

## 2021-08-20 DIAGNOSIS — N13.2 HYDRONEPHROSIS CONCURRENT WITH AND DUE TO CALCULI OF KIDNEY AND URETER: Primary | ICD-10-CM

## 2021-08-20 DIAGNOSIS — Q62.11 HYDRONEPHROSIS WITH URETEROPELVIC JUNCTION (UPJ) OBSTRUCTION: ICD-10-CM

## 2021-08-20 RX ORDER — LEVOCETIRIZINE DIHYDROCHLORIDE 5 MG/1
5 TABLET, FILM COATED ORAL EVERY EVENING
Qty: 90 TABLET | Refills: 1 | Status: SHIPPED | OUTPATIENT
Start: 2021-08-20 | End: 2022-02-16

## 2021-08-20 NOTE — TELEPHONE ENCOUNTER
From: Sreedhar Raines  To: Concepción Sandra MD  Sent: 8/20/2021 7:23 AM EDT  Subject: Test Results Question    Omg! Yes please make the referral! Is this an emergency? Do we need a Nephrologist because of the pooling of urine in the kidney from the blockage?

## 2021-08-26 ENCOUNTER — TRANSCRIBE ORDERS (OUTPATIENT)
Dept: ADMINISTRATIVE | Facility: HOSPITAL | Age: 51
End: 2021-08-26

## 2021-08-26 ENCOUNTER — LAB (OUTPATIENT)
Dept: LAB | Facility: HOSPITAL | Age: 51
End: 2021-08-26

## 2021-08-26 DIAGNOSIS — Z01.818 PREOP TESTING: ICD-10-CM

## 2021-08-26 DIAGNOSIS — Z01.818 PREOP TESTING: Primary | ICD-10-CM

## 2021-08-26 PROCEDURE — U0004 COV-19 TEST NON-CDC HGH THRU: HCPCS

## 2021-08-26 PROCEDURE — C9803 HOPD COVID-19 SPEC COLLECT: HCPCS

## 2021-08-27 ENCOUNTER — TELEPHONE (OUTPATIENT)
Dept: FAMILY MEDICINE CLINIC | Facility: CLINIC | Age: 51
End: 2021-08-27

## 2021-08-27 LAB — SARS-COV-2 ORF1AB RESP QL NAA+PROBE: DETECTED

## 2021-08-27 NOTE — TELEPHONE ENCOUNTER
Caller: MAGUIFRANCISCO J    Relationship to patient: Mother    Best call back number: [phone]    Patient is needing: PATIENT'S MOTHER FRANCISCO J IS CALLING TO ASK FOR ADVICE.  SHE STATES PATIENT TESTED POSITIVE FOR COVID YESTERDAY.  SHE IS WANTING TO KNOW WHAT SHE IS NEEDS TO DO FOR THE PATIENT.  SHE STATES PATIENT WAS SUPPOSED TO HAVE SURGERY FOR A KIDNEY STONE, BUT THAT HAS BEEN CANCELED.  SHE STATES SHE HAS A NUMBER OF QUESTIONS AND WOULD LIKE A CALL TO DISCUSS.    PLEASE ADVISE.

## 2021-09-07 NOTE — PRE-PROCEDURE INSTRUCTIONS
Patient is mentally challenged, mother will need to be brought in as soon as procedure is over to assist with his post op, he has communication issues

## 2021-09-09 ENCOUNTER — LAB (OUTPATIENT)
Dept: LAB | Facility: HOSPITAL | Age: 51
End: 2021-09-09

## 2021-09-09 ENCOUNTER — OFFICE VISIT (OUTPATIENT)
Dept: NEUROLOGY | Facility: CLINIC | Age: 51
End: 2021-09-09

## 2021-09-09 VITALS
WEIGHT: 133 LBS | OXYGEN SATURATION: 99 % | HEART RATE: 60 BPM | DIASTOLIC BLOOD PRESSURE: 82 MMHG | BODY MASS INDEX: 22.71 KG/M2 | HEIGHT: 64 IN | SYSTOLIC BLOOD PRESSURE: 136 MMHG

## 2021-09-09 DIAGNOSIS — G40.814 INTRACTABLE LENNOX-GASTAUT SYNDROME WITHOUT STATUS EPILEPTICUS (HCC): ICD-10-CM

## 2021-09-09 DIAGNOSIS — Z46.2 ENCOUNTER FOR INTERROGATION OF NEUROSTIMULATOR: ICD-10-CM

## 2021-09-09 DIAGNOSIS — Z79.899 HIGH RISK MEDICATION USE: ICD-10-CM

## 2021-09-09 DIAGNOSIS — G40.814 INTRACTABLE LENNOX-GASTAUT SYNDROME WITHOUT STATUS EPILEPTICUS (HCC): Primary | ICD-10-CM

## 2021-09-09 LAB
ALT SERPL W P-5'-P-CCNC: 12 U/L (ref 1–41)
AST SERPL-CCNC: 12 U/L (ref 1–40)
BILIRUB SERPL-MCNC: <0.2 MG/DL (ref 0–1.2)

## 2021-09-09 PROCEDURE — 84460 ALANINE AMINO (ALT) (SGPT): CPT | Performed by: PSYCHIATRY & NEUROLOGY

## 2021-09-09 PROCEDURE — 99214 OFFICE O/P EST MOD 30 MIN: CPT | Performed by: PSYCHIATRY & NEUROLOGY

## 2021-09-09 PROCEDURE — 84450 TRANSFERASE (AST) (SGOT): CPT | Performed by: PSYCHIATRY & NEUROLOGY

## 2021-09-09 PROCEDURE — 82247 BILIRUBIN TOTAL: CPT

## 2021-09-09 PROCEDURE — 36415 COLL VENOUS BLD VENIPUNCTURE: CPT | Performed by: PSYCHIATRY & NEUROLOGY

## 2021-09-09 PROCEDURE — 95977 ALYS CPLX CN NPGT PRGRMG: CPT | Performed by: PSYCHIATRY & NEUROLOGY

## 2021-09-09 RX ORDER — CANNABIDIOL 100 MG/ML
6 SOLUTION ORAL 2 TIMES DAILY
Qty: 360 ML | Refills: 5 | Status: SHIPPED | OUTPATIENT
Start: 2021-09-09 | End: 2022-03-21

## 2021-09-09 NOTE — PATIENT INSTRUCTIONS
[facility]  Payton Harrington MD  Neurology clinic  [phone]    With anti-seizure medications, you may initially notice side effects of fatigue, drowsiness, unsteadiness, and dizziness.  Other possible side effects include nausea, abdominal pain, headache, blurry or double vision, slurred speech and mood changes.  Generally, patients will noticed these symptoms when the medication is first started or with higher doses and will go away with time.    It is import to consistently take your medication every day.  Missing just one dose may put you at risk for a breakthrough seizure.  Consider using reminders on your phone or a pill box.    If you develop a rash, please call the neurology clinic immediately or notify another healthcare professional, as this may be potentially life-threatening.  If you are unable to reach a healthcare professional, go to the emergency room immediately for further evaluation.    If you develop thoughts of wanting to hurt yourself or others, please call the neurology clinic immediately to notify another healthcare professional.  If you are unable to reach a healthcare professional, go to the emergency room immediately for further evaluation.    It is the [place] state law that you cannot drive within 90 days of a seizure.    You should avoid certain activities that if you were to have a seizure, you could harm yourself or others. In general, it is recommended that you avoid operating heavy machinery or power tools, swimming or taking baths by yourself (showers are ok), don't stand over open flames, don't get on high ladders or the roof.  I also recommend to avoid sleeping on your stomach.    For further information on epilepsy and resources available to patients and their families, please visit the [facility] at www.efky.org or call [phone].    **Check out the [facility]'s monthly Art Group Gathering.  They are  located at [facility], [address] [place] [address].  Call Molly Hebert at [phone] or email her at [email or web address] for the dates of future gatherings.**      **If you have having memory problems, consider HOBSCOTCH (Home-Based Self-management and Cognitive Training Changes lives).  It is an 8 week self-management program for adults with epilepsy and memory problems.  The program is free at the [facility].  Contact Love Rojas at [phone] or [email or web address].**

## 2021-09-09 NOTE — PROGRESS NOTES
Subjective:     Patient ID: Sreedhar Raines is a 51 y.o. male.    Kalpesh is a 51-year-old male with a history of allergies, developmental delay, intractable epilepsy status post VNS, and cerebral palsy who presents to the neurology clinic today as established patient for follow-up for seizures.  The patient was last seen on June 2 of 2021.  Is a new patient back in October 2019.  He has failed Depakote, carbamazepine, Keppra, Topamax, Briviact, and Onfi.  He is currently on name brand only Dilantin 200 mg at night.  He was on 300 mg already tried to decrease the dose to get him off the medication.  Reportedly has a history of osteoporosis and after receiving 1 dose of an IV infusion it went away.  He is also on lamotrigine 250 mg twice a day and Zonegran 200 mg twice a day.  We started Epidiolex and he is currently on 4.5 mL twice a day.  Initially he got significant relief.  He was previously having 3-5 seizures a month.  He then only had 2 seizures between March and June.  Unfortunately he got Covid since I last saw him.  He was asymptomatic however.  He also had a kidney stone and a bowel blockage last month.  He had a generalized tonic-clonic seizures.  When they swiped the magnet and stops the seizures.  He also had 4 days of arm jerking.  His increase in seizure start about 2 months ago.  His last seizure was on September 7.    The following portions of the patient's history were reviewed and updated as appropriate: allergies, current medications, past family history, past medical history, past social history, past surgical history and problem list.    Review of Systems     Objective:    Neurologic Exam    Physical Exam   I interrogated the patient's VNS today.  They tolerated the procedure well without any adverse side effects.  Below are the current settings.        Assessment/Plan:    Kalpesh is a 51-year-old male with a history of allergies, developmental delay, intractable epilepsy status post VNS, and cerebral  kaley who presents today for follow-up.    1.  Intractable Lennox-Gastaut syndrome status post VNS-I did increase his output current for his nighttime settings.  We will also increase his Epidiolex to 6 mL twice a day.  We will check liver enzymes today and then again in 6 months.  In the future I would like to try to wean off the zonisamide due to the history of kidney stones.  He may also benefit from being off Dilantin long-term as well.  I will see the patient back in 3 months time or sooner if needed.    A total of 30 minutes of time was spent on this encounter today.  This includes reviewing patient's records, face-to-face time, and documentation.       Problems Addressed this Visit        Neuro    Epilepsy (CMS/HCC) - Primary    Relevant Medications    Cannabidiol (Epidiolex) 100 MG/ML solution    Other Relevant Orders    ALT    AST    Bilirubin, Total    Encounter for interrogation of neurostimulator      Other Visit Diagnoses     High risk medication use        Relevant Orders    ALT    AST    Bilirubin, Total      Diagnoses       Codes Comments    Intractable Lennox-Gastaut syndrome without status epilepticus (CMS/HCC)    -  Primary ICD-10-CM: G40.814  ICD-9-CM: 345.10     High risk medication use     ICD-10-CM: Z79.899  ICD-9-CM: V58.69     Encounter for interrogation of neurostimulator     ICD-10-CM: Z46.2  ICD-9-CM: V53.02

## 2021-09-13 ENCOUNTER — ON CAMPUS - OUTPATIENT (OUTPATIENT)
Dept: URBAN - METROPOLITAN AREA HOSPITAL 85 | Facility: HOSPITAL | Age: 51
End: 2021-09-13
Payer: MEDICARE

## 2021-09-13 ENCOUNTER — ANESTHESIA (OUTPATIENT)
Dept: GASTROENTEROLOGY | Facility: HOSPITAL | Age: 51
End: 2021-09-13

## 2021-09-13 ENCOUNTER — ANESTHESIA EVENT (OUTPATIENT)
Dept: GASTROENTEROLOGY | Facility: HOSPITAL | Age: 51
End: 2021-09-13

## 2021-09-13 ENCOUNTER — HOSPITAL ENCOUNTER (OUTPATIENT)
Facility: HOSPITAL | Age: 51
Setting detail: HOSPITAL OUTPATIENT SURGERY
Discharge: HOME | End: 2021-09-13
Attending: INTERNAL MEDICINE | Admitting: INTERNAL MEDICINE

## 2021-09-13 VITALS
HEIGHT: 64 IN | BODY MASS INDEX: 21.91 KG/M2 | WEIGHT: 128.31 LBS | DIASTOLIC BLOOD PRESSURE: 87 MMHG | RESPIRATION RATE: 12 BRPM | OXYGEN SATURATION: 100 % | SYSTOLIC BLOOD PRESSURE: 132 MMHG | TEMPERATURE: 98.8 F | HEART RATE: 54 BPM

## 2021-09-13 DIAGNOSIS — Z12.11 SCREENING FOR COLON CANCER: ICD-10-CM

## 2021-09-13 DIAGNOSIS — Z12.11 ENCOUNTER FOR SCREENING FOR MALIGNANT NEOPLASM OF COLON: ICD-10-CM

## 2021-09-13 DIAGNOSIS — D12.0 BENIGN NEOPLASM OF CECUM: ICD-10-CM

## 2021-09-13 PROCEDURE — 45385 COLONOSCOPY W/LESION REMOVAL: CPT | Mod: 33 | Performed by: INTERNAL MEDICINE

## 2021-09-13 PROCEDURE — 88305 TISSUE EXAM BY PATHOLOGIST: CPT | Performed by: INTERNAL MEDICINE

## 2021-09-13 PROCEDURE — 25010000002 PROPOFOL 10 MG/ML EMULSION: Performed by: ANESTHESIOLOGY

## 2021-09-13 RX ORDER — ONDANSETRON 2 MG/ML
4 INJECTION INTRAMUSCULAR; INTRAVENOUS ONCE AS NEEDED
Status: DISCONTINUED | OUTPATIENT
Start: 2021-09-13 | End: 2021-09-13 | Stop reason: HOSPADM

## 2021-09-13 RX ORDER — SODIUM CHLORIDE 0.9 % (FLUSH) 0.9 %
3 SYRINGE (ML) INJECTION EVERY 12 HOURS SCHEDULED
Status: DISCONTINUED | OUTPATIENT
Start: 2021-09-13 | End: 2021-09-13 | Stop reason: HOSPADM

## 2021-09-13 RX ORDER — MAGNESIUM CARB/ALUMINUM HYDROX 105-160MG
296 TABLET,CHEWABLE ORAL ONCE
Status: DISCONTINUED | OUTPATIENT
Start: 2021-09-13 | End: 2021-09-13 | Stop reason: HOSPADM

## 2021-09-13 RX ORDER — SODIUM CHLORIDE 9 MG/ML
9 INJECTION, SOLUTION INTRAVENOUS ONCE
Status: COMPLETED | OUTPATIENT
Start: 2021-09-13 | End: 2021-09-13

## 2021-09-13 RX ORDER — PROPOFOL 10 MG/ML
VIAL (ML) INTRAVENOUS AS NEEDED
Status: DISCONTINUED | OUTPATIENT
Start: 2021-09-13 | End: 2021-09-13 | Stop reason: SURG

## 2021-09-13 RX ORDER — SODIUM CHLORIDE 0.9 % (FLUSH) 0.9 %
3-10 SYRINGE (ML) INJECTION AS NEEDED
Status: DISCONTINUED | OUTPATIENT
Start: 2021-09-13 | End: 2021-09-13 | Stop reason: HOSPADM

## 2021-09-13 RX ADMIN — SODIUM CHLORIDE 9 ML/HR: 9 INJECTION, SOLUTION INTRAVENOUS at 09:57

## 2021-09-13 RX ADMIN — PROPOFOL 170 MG: 10 INJECTION, EMULSION INTRAVENOUS at 10:23

## 2021-09-13 NOTE — ANESTHESIA PREPROCEDURE EVALUATION
Anesthesia Evaluation     Patient summary reviewed and Nursing notes reviewed   NPO Solid Status: > 8 hours  NPO Liquid Status: > 8 hours           Airway   Mallampati: II  TM distance: >3 FB  Neck ROM: full  No difficulty expected  Dental - normal exam     Pulmonary    Cardiovascular         Neuro/Psych  (+) seizures,     GI/Hepatic/Renal/Endo    (+)  hiatal hernia,  renal disease,     Musculoskeletal     Abdominal    Substance History      OB/GYN          Other        ROS/Med Hx Other: Cerebral palsy                  Anesthesia Plan    ASA 3     MAC     intravenous induction     Anesthetic plan, all risks, benefits, and alternatives have been provided, discussed and informed consent has been obtained with: patient.

## 2021-09-13 NOTE — ANESTHESIA POSTPROCEDURE EVALUATION
Patient: Sreedhar Raines    Procedure Summary     Date: 09/13/21 Room / Location: [facility] ENDOSCOPY 1 / [facility] ENDOSCOPY    Anesthesia Start: 1020 Anesthesia Stop: 1042    Procedure: COLONOSCOPY with polypectomy x 1 (N/A ) Diagnosis:       Screening for colon cancer      (Screening for colon cancer [Z12.11])    Surgeons: Phoenix Melissa MD Provider: Raul Garcia MD    Anesthesia Type: MAC ASA Status: 3          Anesthesia Type: MAC    Vitals  Vitals Value Taken Time   /87 09/13/21 1054   Temp     Pulse 55 09/13/21 1055   Resp 12 09/13/21 1054   SpO2 100 % 09/13/21 1055   Vitals shown include unvalidated device data.        Post Anesthesia Care and Evaluation    Patient location during evaluation: PACU  Patient participation: complete - patient participated  Level of consciousness: awake  Pain scale: See nurse's notes for pain score.  Pain management: adequate  Airway patency: patent  Anesthetic complications: No anesthetic complications  PONV Status: none  Cardiovascular status: acceptable  Respiratory status: acceptable  Hydration status: acceptable    Comments: Patient seen and examined postoperatively; vital signs stable; SpO2 greater than or equal to 90%; cardiopulmonary status stable; nausea/vomiting adequately controlled; pain adequately controlled; no apparent anesthesia complications; patient discharged from anesthesia care when discharge criteria were met

## 2021-09-13 NOTE — OP NOTE
COLONOSCOPY Procedure Report    Patient Name:  Sreedhar Raines  YOB: 1970    Date of Surgery:  9/13/2021     Pre-Op Diagnosis:  Screening for colon cancer [Z12.11]    Post-Op Diagnosis:  1.  Cecal polyp       Procedure/CPT® Codes:      Procedure(s):  COLONOSCOPY    Staff:  Surgeon(s):  Phoenix Melissa MD      Anesthesia: Monitored Anesthesia Care    Description of Procedure:  A description of the procedure as well as risks, benefits and alternative methods were explained to the patient who voiced understanding and signed the corresponding consent form. A physical exam was performed and vital signs were monitored throughout the procedure.    After informed consent was obtained the patient was placed in the left lateral decubitus position and sedated as above.  Digital rectal examination was performed and was normal.  The Olympus video colonoscope was inserted into the rectum and advanced to the cecum without difficulty.  A careful examination of the colon was performed as the colonoscope was slowly withdrawn.  The bowel prep was excellent.  The cecum and appendiceal orifice were identified. The scope was then retroflexed and the distal rectum was visualized. The procedure was not difficult and there were no immediate complications.  There was no blood loss.    Findings:   5 mm cecal polyp removed with cold snare polypectomy.  Otherwise normal examination.    Impression:  5 mm cecal polyp    Recommendations:  1.  High-fiber diet  2.  Repeat colonoscopy in 5 years  3.  Follow my office as needed      Phoenix Melissa MD     Date: 9/13/2021    Time: 10:42 EDT

## 2021-09-13 NOTE — H&P
GI PREOPERATIVE HISTORY AND PHYSICAL:    Referring Provider:    Concepción Sandra MD    Chief complaint: Screening colonoscopy    Subjective .     History of present illness:      Sreedhar Raines is a 51 y.o. male who presents today for Procedure(s):  COLONOSCOPY for the indications listed below.     Screening colonoscopy    The updated Patient Profile was reviewed prior to the procedure, in conjunction with the Physical Exam, including medical conditions, surgical procedures, medications, allergies, family history and social history.     Pre-operatively, I reviewed the indication(s) for the procedure, the risks of the procedure [including but not limited to: unexpected bleeding possibly requiring hospitalization and/or unplanned repeat procedures, perforation possibly requiring surgical treatment, missed lesions and complications of sedation/MAC (also explained by anesthesia staff)].     I have evaluated the patient for risks associated with the planned anesthesia and the procedure to be performed and find the patient an acceptable candidate for IV sedation.    Multiple opportunities were provided for any questions or concerns, and all questions were answered satisfactorily before any anesthesia was administered. We will proceed with the planned procedure.    Past Medical History:  Past Medical History:   Diagnosis Date   • Cerebral palsy (CMS/HCC)    • Hiatal hernia    • Kidney stone    • Mood disorder (CMS/HCC)    • Seizures (CMS/HCC)        Past Surgical History:  Past Surgical History:   Procedure Laterality Date   • FOOT SURGERY Right    • PLANTAR FASCIA SURGERY Left    • VAGUS NERVE STIMULATOR IMPLANTATION         Social History:  Social History     Tobacco Use   • Smoking status: Never Smoker   • Smokeless tobacco: Never Used   Vaping Use   • Vaping Use: Never used   Substance Use Topics   • Alcohol use: Never   • Drug use: Never       Family History:  Family History   Problem Relation Age of Onset   •  "Hypertension Mother    • Hyperlipidemia Mother    • Cancer Father         stomach   • Diabetes Maternal Uncle    • Cancer Maternal Grandmother         multiple myeloma   • Dementia Maternal Grandmother    • Hyperlipidemia Maternal Grandmother    • Alzheimer's disease Paternal Grandfather 70       Medications:  No medications prior to admission.       Scheduled Meds:  Continuous Infusions:No current facility-administered medications for this encounter.    PRN Meds:.    ALLERGIES:  Briviact [brivaracetam], Clobazam, Depakote [valproic acid], and Tegretol [carbamazepine]    ROS:  The following systems were reviewed and negative;   Constitution:  No fevers, chills, no unintentional weight loss  Skin: no rash, no jaundice  Eyes:  No blurry vision, no eye pain  HENT:  No change in hearing or smell  Resp:  No dyspnea or cough  CV:  No chest pain or palpitations  :  No dysuria, hematuria  Musculoskeletal:  No leg cramps or arthralgias  Neuro:  No tremor, no numbness  Psych:  No depression or confsuion    Objective     Vital Signs:   Vitals:    09/07/21 1532   Weight: 59.9 kg (132 lb)   Height: 162.6 cm (64\")       Physical Exam:       General Appearance:    Awake and alert, in no acute distress   Head:    Normocephalic, without obvious abnormality, atraumatic   Throat:   No oral lesions, no thrush, oral mucosa moist   Lungs  Cardiac:  Abdomen:  Extremities:     Respirations regular, even and unlabored    Regular rate and rhythm, no murmur, gallop, rub    Non-distended, good bowel sounds, non tender, no masses     No edema, pulses 2+   Skin:   No rash, no jaundice       Results Review:  Lab Results (last 24 hours)     ** No results found for the last 24 hours. **          Imaging Results (Last 24 Hours)     ** No results found for the last 24 hours. **           I reviewed the patient's labs and imaging.    ASSESSMENT AND PLAN:  Screening colonoscopy    Active Problems:    * No active hospital problems. *   "     Procedure(s):  COLONOSCOPY      I discussed the patients findings and my recommendations with the patient.    Phoenix Melissa MD  09/13/21  07:13 EDT

## 2021-09-13 NOTE — DISCHARGE INSTRUCTIONS
A responsible adult should stay with you and you should rest quietly for the rest of the day.    Do not drink alcohol, drive, operate any heavy machinery or power tools or make any legal/important decisions for the next 24 hours.     Progress your diet as tolerated.  If you begin to experience severe pain, increased shortness of breath, racing heartbeat or a fever above 101 F, seek immediate medical attention.     Follow up with MD as instructed. Call office for results in 3 to 5 days if needed. [phone]    Impression:  5 mm cecal polyp     Recommendations:  1.  High-fiber diet  2.  Repeat colonoscopy in 5 years  3.  Follow my office as needed

## 2021-09-14 LAB
LAB AP CASE REPORT: NORMAL
PATH REPORT.FINAL DX SPEC: NORMAL
PATH REPORT.GROSS SPEC: NORMAL

## 2021-09-15 ENCOUNTER — TRANSCRIBE ORDERS (OUTPATIENT)
Dept: PULMONOLOGY | Facility: HOSPITAL | Age: 51
End: 2021-09-15

## 2021-09-15 ENCOUNTER — OFFICE VISIT (OUTPATIENT)
Dept: FAMILY MEDICINE CLINIC | Facility: CLINIC | Age: 51
End: 2021-09-15

## 2021-09-15 VITALS
SYSTOLIC BLOOD PRESSURE: 154 MMHG | HEART RATE: 57 BPM | TEMPERATURE: 98.4 F | DIASTOLIC BLOOD PRESSURE: 88 MMHG | HEIGHT: 64 IN | BODY MASS INDEX: 22.88 KG/M2 | WEIGHT: 134 LBS | OXYGEN SATURATION: 100 %

## 2021-09-15 DIAGNOSIS — G40.814 INTRACTABLE LENNOX-GASTAUT SYNDROME WITHOUT STATUS EPILEPTICUS (HCC): ICD-10-CM

## 2021-09-15 DIAGNOSIS — G80.2 SPASTIC HEMIPLEGIC CEREBRAL PALSY (HCC): Primary | ICD-10-CM

## 2021-09-15 DIAGNOSIS — R03.0 ELEVATED BP WITHOUT DIAGNOSIS OF HYPERTENSION: ICD-10-CM

## 2021-09-15 DIAGNOSIS — Z01.818 OTHER SPECIFIED PRE-OPERATIVE EXAMINATION: Primary | ICD-10-CM

## 2021-09-15 DIAGNOSIS — R41.89 IMPAIRMENT OF COGNITIVE FUNCTION: ICD-10-CM

## 2021-09-15 DIAGNOSIS — Z13.6 ENCOUNTER FOR LIPID SCREENING FOR CARDIOVASCULAR DISEASE: ICD-10-CM

## 2021-09-15 DIAGNOSIS — Z00.00 ENCOUNTER FOR MEDICARE ANNUAL WELLNESS EXAM: ICD-10-CM

## 2021-09-15 DIAGNOSIS — F39 MOOD DISORDER (HCC): ICD-10-CM

## 2021-09-15 DIAGNOSIS — N20.0 NEPHROLITHIASIS: ICD-10-CM

## 2021-09-15 DIAGNOSIS — Z13.220 ENCOUNTER FOR LIPID SCREENING FOR CARDIOVASCULAR DISEASE: ICD-10-CM

## 2021-09-15 PROCEDURE — 1170F FXNL STATUS ASSESSED: CPT | Performed by: FAMILY MEDICINE

## 2021-09-15 PROCEDURE — G0439 PPPS, SUBSEQ VISIT: HCPCS | Performed by: FAMILY MEDICINE

## 2021-09-15 PROCEDURE — 1160F RVW MEDS BY RX/DR IN RCRD: CPT | Performed by: FAMILY MEDICINE

## 2021-09-15 RX ORDER — TAMSULOSIN HYDROCHLORIDE 0.4 MG/1
1 CAPSULE ORAL DAILY
Qty: 90 CAPSULE | Refills: 1 | Status: SHIPPED | OUTPATIENT
Start: 2021-09-15 | End: 2021-12-17

## 2021-09-15 NOTE — PATIENT INSTRUCTIONS
Mediterranean Diet  A Mediterranean diet refers to food and lifestyle choices that are based on the traditions of countries located on the [place]. This way of eating has been shown to help prevent certain conditions and improve outcomes for people who have chronic diseases, like kidney disease and heart disease.  What are tips for following this plan?  Lifestyle  · Cook and eat meals together with your family, when possible.  · Drink enough fluid to keep your urine clear or pale yellow.  · Be physically active every day. This includes:  ? Aerobic exercise like running or swimming.  ? Leisure activities like gardening, walking, or housework.  · Get 7-8 hours of sleep each night.  · If recommended by your health care provider, drink red wine in moderation. This means 1 glass a day for nonpregnant women and 2 glasses a day for men. A glass of wine equals 5 oz (150 mL).  Reading food labels    · Check the serving size of packaged foods. For foods such as rice and pasta, the serving size refers to the amount of cooked product, not dry.  · Check the total fat in packaged foods. Avoid foods that have saturated fat or trans fats.  · Check the ingredients list for added sugars, such as corn syrup.    Shopping  · At the grocery store, buy most of your food from the areas near the walls of the store. This includes:  ? Fresh fruits and vegetables (produce).  ? Grains, beans, nuts, and seeds. Some of these may be available in unpackaged forms or large amounts (in bulk).  ? Fresh seafood.  ? Poultry and eggs.  ? Low-fat dairy products.  · Buy whole ingredients instead of prepackaged foods.  · Buy fresh fruits and vegetables in-season from local farmers markets.  · Buy frozen fruits and vegetables in resealable bags.  · If you do not have access to quality fresh seafood, buy precooked frozen shrimp or canned fish, such as tuna, salmon, or sardines.  · Buy small amounts of raw or cooked vegetables, salads, or olives from  the deli or salad bar at your store.  · Stock your pantry so you always have certain foods on hand, such as olive oil, canned tuna, canned tomatoes, rice, pasta, and beans.  Cooking  · Cook foods with extra-virgin olive oil instead of using butter or other vegetable oils.  · Have meat as a side dish, and have vegetables or grains as your main dish. This means having meat in small portions or adding small amounts of meat to foods like pasta or stew.  · Use beans or vegetables instead of meat in common dishes like chili or lasagna.  · [place] with different cooking methods. Try roasting or broiling vegetables instead of steaming or sautéeing them.  · Add frozen vegetables to soups, stews, pasta, or rice.  · Add nuts or seeds for added healthy fat at each meal. You can add these to yogurt, salads, or vegetable dishes.  · Marinate fish or vegetables using olive oil, lemon juice, garlic, and fresh herbs.  Meal planning    · Plan to eat 1 vegetarian meal one day each week. Try to work up to 2 vegetarian meals, if possible.  · Eat seafood 2 or more times a week.  · Have healthy snacks readily available, such as:  ? Vegetable sticks with hummus.  ? Greek yogurt.  ? Fruit and nut trail mix.  · Eat balanced meals throughout the week. This includes:  ? Fruit: 2-3 servings a day  ? Vegetables: 4-5 servings a day  ? Low-fat dairy: 2 servings a day  ? Fish, poultry, or lean meat: 1 serving a day  ? Beans and legumes: 2 or more servings a week  ? Nuts and seeds: 1-2 servings a day  ? Whole grains: 6-8 servings a day  ? Extra-virgin olive oil: 3-4 servings a day  · Limit red meat and sweets to only a few servings a month    What are my food choices?  · Mediterranean diet  ? Recommended  § Grains: Whole-grain pasta. Brown rice. Bulgar wheat. Polenta. Couscous. Whole-wheat bread. Oatmeal. Quinoa.  § Vegetables: Artichokes. Beets. Broccoli. Cabbage. Carrots. Eggplant. Green beans. Chard. Kale. Spinach. Onions. Leeks. Peas. Squash.  Tomatoes. Peppers. Radishes.  § Fruits: Apples. Apricots. Avocado. Berries. Bananas. Cherries. Dates. Figs. Grapes. Isaías. Melon. Oranges. Peaches. Plums. Pomegranate.  § Meats and other protein foods: Beans. Almonds. Sunflower seeds. Pine nuts. Peanuts. Cod. [place]. Scallops. Shrimp. Tuna. Tilapia. Clams. Oysters. Eggs.  § Dairy: Low-fat milk. Cheese. Greek yogurt.  § Beverages: Water. Red wine. Herbal tea.  § Fats and oils: Extra virgin olive oil. Avocado oil. Grape seed oil.  § Sweets and desserts: Greek yogurt with honey. Baked apples. Poached pears. Trail mix.  § Seasoning and other foods: Basil. Cilantro. Coriander. Cumin. Mint. Parsley. [place]. Rosemary. Tarragon. Garlic. Oregano. Thyme. Pepper. Balsalmic vinegar. Tahini. Hummus. Tomato sauce. Olives. Mushrooms.  ? Limit these  § Grains: Prepackaged pasta or rice dishes. Prepackaged cereal with added sugar.  § Vegetables: Deep fried potatoes (french fries).  § Fruits: Fruit canned in syrup.  § Meats and other protein foods: Beef. Pork. Lamb. Poultry with skin. Hot dogs. [place].  § Dairy: Ice cream. Sour cream. Whole milk.  § Beverages: Juice. Sugar-sweetened soft drinks. Beer. Liquor and spirits.  § Fats and oils: Butter. Canola oil. Vegetable oil. Beef fat (tallow). Lard.  § Sweets and desserts: Cookies. Cakes. Pies. Candy.  § Seasoning and other foods: Mayonnaise. Premade sauces and marinades.  The items listed may not be a complete list. Talk with your dietitian about what dietary choices are right for you.  Summary  · The Mediterranean diet includes both food and lifestyle choices.  · Eat a variety of fresh fruits and vegetables, beans, nuts, seeds, and whole grains.  · Limit the amount of red meat and sweets that you eat.  · Talk with your health care provider about whether it is safe for you to drink red wine in moderation. This means 1 glass a day for nonpregnant women and 2 glasses a day for men. A glass of wine equals 5 oz (150 mL).  This information  is not intended to replace advice given to you by your health care provider. Make sure you discuss any questions you have with your health care provider.  Document Revised: 08/17/2017 Document Reviewed: 08/10/2017  Elsevier Patient Education © 2020 [employer]       ADVANCE CARE PLANNING  Conversations that Matter  PLANNING GUIDE    LOOKING BACK ...  Who we are, what we believe, and what we value are all shaped by experiences we have had. Taoist, family traditions, jobs and friends affect us deeply.    Has anything happened in your past that shaped your feelings about medical treatment?    Think about an experience you may have had with a family member or friend who was faced with a decision about medical care near the end of life. What was positive about that experience? What do you wish would have been done differently?    HERE AND NOW ...  Do you have any significant health problems now? What kinds of things bring you such doreen that, should a health problem prevent you from doing them any more, life would have little meaning? What short- or long-term goals do you have? How might medical treatment help you or hinder you in accomplishing those goals?    WHAT ABOUT TOMORROW?  What significant health problems do you fear may affect you in the future? How do you feel about the possibility of having to go to a nursing home? How would decisions be made if you could not make them?    WHO SHOULD MAKE DECISIONS?  An important part of planning is to consider whether or not you could appoint someone to make your healthcare decisions if you could not make them yourself. Many people select a close family member, but you are free to pick anyone you think could best represent you. Whoever you appoint should have all of the following qualifications:    • Can be trusted.  • Is willing to accept this responsibility.  • Is willing to follow the values and instructions you have discussed.  • Is able to make complex, difficult  "decisions.    It is helpful, but not required, to appoint one or more alternate persons in case your first choice becomes unable or unwilling to represent you. It is best if only one person has authority at a time, but you can instruct your representatives to discuss decisions together if time permits.    WHAT FUTURE DECISIONS NEED TO BE CONSIDERED?  Providing instructions for future healthcare decisions may seem like an impossible task. How can anyone plan for all the possibilities? You cannot … but you do not have to.    You need to plan for situations where you:  1. Become unexpectedly incapable of making your own decisions  2. It is clear you will have little or no recovery, and  3. The injury or loss of function is significant.    Such a situation might arise because of an injury to the brain from an accident, a stroke, or a slowly progressive disease such as Alzheimer's.    To plan for this type of situation, many people state, \"If I'm going to be a vegetable, let me go,\" or \"No heroics,\" or \"Don't keep me alive on machines.\" While these remarks are a beginning, they simply are too vague to guide decision-making.    You need to completely describe under what circumstances your goals for medical care should be changed from attempting to prolong life to being allowed to die. In some situations, certain treatments may not make sense because they will not help, but other treatments will be of important benefit.    Consider these three questions:  1. When would it make sense to continue certain treatments in an effort to prolong life and seek recovery?  2. When would it make sense to stop or withhold certain treatments and accept death when it comes?  3. Under any circumstance, what kind of comfort care would you want, including medication, spiritual and environmental options?    Making these choices requires understanding the information, weighing the benefits and burdens from your perspective, and then discussing " your choices with those closest to you.    WHAT'S NEXT?  How do you make sure that your choices are respected? First talk, about them with your family, friends, clergy and physician, then put your choices in writing. Information about putting your plans into writing -- in an advance directive -- is available from your healthcare organization or .    Do you have any significant health problems? What health problems do you fear in the future?     Consider what frightens you most about medical treatment. What role does Scientology, adali or spirituality play in how you live your life? How does cost influence your decisions about medical care?   In terms of future medical care, under what circumstances would you want the goals of medical treatment to switch from attempting to prolong life to focusing on comfort?     Describe these circumstances in as much detail as possible.   Ask yourself, what will most help me live well at this point in my life?     Share your views with the person or people who would make your medical decisions if you could not make them.     THERE'S NO EASY WAY TO PLAN FOR FUTURE HEALTHCARE CHOICES.  It's a process that involves thinking and talking about complex and sensitive issues.    The questions that follow will help in the advance care planning process. This is a guide for your own benefit; it's not a test, and there are no right or wrong answers. It does not need to be completed all at once. You may use it to share your feelings with healthcare providers, your family and your friends. The answers to these questions will help those you love make choices for you when you cannot make them yourself.    These are things I need to tell my loved ones:  What is your idea of comfort care? Describe how you would want medications to be used to provide comfort. What type of spiritual care would you want?     I need to learn about: ____________________________  I need to ask my healthcare provider:  ________________

## 2021-09-15 NOTE — PROGRESS NOTES
The ABCs of the Annual Wellness Visit  Subsequent Medicare Wellness Visit    Chief Complaint   Patient presents with   • Medicare Wellness-subsequent      Subjective    History of Present Illness:  Sreedhar Raines is a 51 y.o. male who presents for a Subsequent Medicare Wellness Visit.    Present with mother who serves as historian and primary caregiver.    The following portions of the patient's history were reviewed and   updated as appropriate: allergies, current medications, past family history, past medical history, past social history, past surgical history and problem list.    Compared to one year ago, the patient feels his physical   health is the same.    Compared to one year ago, the patient feels his mental   health is the same.    Recent Hospitalizations:  This patient has had a [facility] admission record on file within the last 365 days.    Current Medical Providers:  Patient Care Team:  Concepción Sandra MD as PCP - General (Family Medicine)    Outpatient Medications Prior to Visit   Medication Sig Dispense Refill   • Cannabidiol (Epidiolex) 100 MG/ML solution Take 6 mL by mouth 2 (two) times a day. 360 mL 5   • dicyclomine (Bentyl) 10 MG capsule Take 1 capsule by mouth 3 (Three) Times a Day As Needed (Abdominal Pain). 30 capsule 0   • Dilantin 100 MG ER capsule Take 2 capsules by mouth Daily. (Patient taking differently: Take 200 mg by mouth Every Night.) 60 capsule 5   • lactulose (CHRONULAC) 10 GM/15ML solution Take 30 mL by mouth 2 (Two) Times a Day As Needed (Constipation). 473 mL 1   • lamoTRIgine (LaMICtal) 100 MG tablet Take 2.5 tablets by mouth 2 (Two) Times a Day. 450 tablet 3   • levocetirizine (XYZAL) 5 MG tablet Take 1 tablet by mouth Every Evening. 90 tablet 1   • LORazepam (ATIVAN) 1 MG tablet Take 1 tablet by mouth Every 8 (Eight) Hours As Needed for Anxiety (Agitation). 60 tablet 0   • montelukast (SINGULAIR) 10 MG tablet Take 1 tablet by mouth Every Night. 90 tablet 1   •  "polyethylene glycol (MIRALAX) 17 GM/SCOOP powder Take 17 g by mouth Daily. 850 g 1   • risperiDONE (risperDAL) 0.5 MG tablet TAKE 1 TABLET BY MOUTH EVERY NIGHT 30 tablet 5   • tamsulosin (FLOMAX) 0.4 MG capsule 24 hr capsule Take 1 capsule by mouth Daily. 30 capsule 0   • zonisamide (ZONEGRAN) 100 MG capsule TAKE 2 CAPSULES BY MOUTH TWICE DAILY 360 capsule 3     No facility-administered medications prior to visit.       No opioid medication identified on active medication list. I have reviewed chart for other potential  high risk medication/s and harmful drug interactions in the elderly.          Aspirin is not on active medication list.  Aspirin use is not indicated based on review of current medical condition/s. Risk of harm outweighs potential benefits.  .    Patient Active Problem List   Diagnosis   • Epilepsy (CMS/HCC)   • Encounter for interrogation of neurostimulator   • Spastic hemiplegic cerebral palsy (CMS/HCC)   • Mood disorder (CMS/HCC)   • Agitation   • Environmental and seasonal allergies   • Impairment of cognitive function     Advance Care Planning  Advance Directive is not on file.  ACP discussion was held with the patient during this visit. Patient has an advance directive (not in EMR), copy requested.          Objective    Vitals:    09/15/21 1242   BP: 154/88   BP Location: Left arm   Patient Position: Sitting   Cuff Size: Small Adult   Pulse: 57   Temp: 98.4 °F (36.9 °C)   TempSrc: Infrared   SpO2: 100%   Weight: 60.8 kg (134 lb)   Height: 162.6 cm (64\")     BMI Readings from Last 1 Encounters:   09/15/21 23.00 kg/m²   BMI is within normal parameters. No follow-up required.    Does the patient have evidence of cognitive impairment? Yes    Physical Exam  Vitals reviewed.   Constitutional:       General: He is not in acute distress.     Appearance: He is well-developed. He is not diaphoretic.   HENT:      Head: Normocephalic and atraumatic.      Right Ear: Tympanic membrane and ear canal normal. "      Left Ear: Tympanic membrane and ear canal normal.      Mouth/Throat:      Mouth: Mucous membranes are moist.      Pharynx: Oropharynx is clear.   Eyes:      Extraocular Movements: Extraocular movements intact.      Right eye: Nystagmus (brief) present.      Left eye: Nystagmus (brief) present.      Pupils: Pupils are equal, round, and reactive to light.   Cardiovascular:      Rate and Rhythm: Normal rate and regular rhythm.      Heart sounds: Normal heart sounds.   Pulmonary:      Effort: Pulmonary effort is normal.      Breath sounds: Normal breath sounds. No wheezing.   Abdominal:      General: Bowel sounds are normal.   Musculoskeletal:         General: Deformity (smaller right foot) present.      Right hand: Deformity (contracture of right hand; wearing brace) present.      Right lower leg: No edema.      Left lower leg: No edema.   Skin:     General: Skin is warm and dry.   Neurological:      Mental Status: He is alert and oriented to person, place, and time. Mental status is at baseline.   Psychiatric:         Mood and Affect: Mood normal.         Behavior: Behavior normal.                 HEALTH RISK ASSESSMENT    Smoking Status:  Social History     Tobacco Use   Smoking Status Never Smoker   Smokeless Tobacco Never Used     Alcohol Consumption:  Social History     Substance and Sexual Activity   Alcohol Use Never     Fall Risk Screen:    [facility] Fall Risk Assessment has not been completed.    Depression Screening:  PHQ-2/PHQ-9 Depression Screening 9/15/2021   Little interest or pleasure in doing things 0   Feeling down, depressed, or hopeless 0   Trouble falling or staying asleep, or sleeping too much -   Feeling tired or having little energy -   Poor appetite or overeating -   Feeling bad about yourself - or that you are a failure or have let yourself or your family down -   Trouble concentrating on things, such as reading the newspaper or watching television -   Moving or speaking so slowly that other  people could have noticed. Or the opposite - being so fidgety or restless that you have been moving around a lot more than usual -   Thoughts that you would be better off dead, or of hurting yourself in some way -   Total Score 0   If you checked off any problems, how difficult have these problems made it for you to do your work, take care of things at home, or get along with other people? -       Health Habits and Functional and Cognitive Screening:  Functional & Cognitive Status 9/15/2021   Do you have difficulty preparing food and eating? Yes   Do you have difficulty bathing yourself, getting dressed or grooming yourself? No   Do you have difficulty using the toilet? No   Do you have difficulty moving around from place to place? No   Do you have trouble with steps or getting out of a bed or a chair? Yes   Current Diet Well Balanced Diet   Dental Exam Up to date   Eye Exam Not up to date   Exercise (times per week) 0 times per week   Current Exercises Include No Regular Exercise   Current Exercise Activities Include -   Do you need help using the phone?  No   Are you deaf or do you have serious difficulty hearing?  No   Do you need help with transportation? Yes   Do you need help shopping? Yes   Do you need help preparing meals?  Yes   Do you need help with housework?  Yes   Do you need help with laundry? Yes   Do you need help taking your medications? Yes   Do you need help managing money? Yes   Do you ever drive or ride in a car without wearing a seat belt? No   Have you felt unusual stress, anger or loneliness in the last month? No   Who do you live with? (No Data)   If you need help, do you have trouble finding someone available to you? No   Have you been bothered in the last four weeks by sexual problems? -   Do you have difficulty concentrating, remembering or making decisions? Yes       Age-appropriate Screening Schedule:  Refer to the list below for future screening recommendations based on patient's age,  sex and/or medical conditions. Orders for these recommended tests are listed in the plan section. The patient has been provided with a written plan.    Health Maintenance   Topic Date Due   • TDAP/TD VACCINES (1 - Tdap) Never done   • ZOSTER VACCINE (1 of 2) Never done   • LIPID PANEL  09/15/2021   • INFLUENZA VACCINE  10/01/2021              Assessment/Plan   CMS Preventative Services Quick Reference  Risk Factors Identified During Encounter  Cardiovascular Disease  Dementia/Memory   Depression/Dysphoria  Fall Risk-High or Moderate  Immunizations Discussed/Encouraged (specific Immunizations; Tdap and Shingrix  The above risks/problems have been discussed with the patient.  Follow up actions/plans if indicated are seen below in the Assessment/Plan Section.  Pertinent information has been shared with the patient in the After Visit Summary.    Problem List Items Addressed This Visit        Mental Health    Mood disorder (CMS/[facility])    Overview     Exacerbated by cerebral palsy and intellectual disability.  Continue risperidone 0.5 mg nightly.  Continue as needed Ativan 1 mg.  Discussed health associate with chronic benzodiazepine use.  Monitoring for metabolic dysfunction with mood stabilizer.  RTO if symptoms worsen.         Relevant Orders    Lipid Panel (Completed)    Comprehensive metabolic panel (Completed)    CBC w AUTO Differential (Completed)       Neuro    Epilepsy (CMS/[facility])    Overview     Status post neurostimulator implant.  Continue cannabinoid oil 600 mg twice daily, Dilantin 200 mg daily, Lamictal 250 mg twice daily, and Zonegran 200 mg twice daily.  Monitoring for organ dysfunction.  Followed by neurology.         Relevant Orders    Lipid Panel (Completed)    Comprehensive metabolic panel (Completed)    CBC w AUTO Differential (Completed)    Spastic hemiplegic cerebral palsy (CMS/[facility]) - Primary    Overview     Right sided hemiplegia.  Under the full time care of mother, guardian.  Followed by neurology  for associated seizures.         Relevant Orders    Lipid Panel (Completed)    Comprehensive metabolic panel (Completed)    CBC w AUTO Differential (Completed)    Impairment of cognitive function    Overview     Mother has guardianship.  Discussed advance care planning.  Educational material provided in AVS.           Other Visit Diagnoses     Nephrolithiasis        Continue Flomax.  Followed by urology.  Scheduled to undergo lithotripsy.    Relevant Medications    tamsulosin (FLOMAX) 0.4 MG capsule 24 hr capsule    Elevated BP without diagnosis of hypertension        BP not at goal, <140/90.  Encouraged low-Na+ diet & 150 min exercise/week.   Patient to monitor ambulatory BP.  Monitoring renal function.    Relevant Orders    Lipid Panel (Completed)    Encounter for lipid screening for cardiovascular disease        Relevant Orders    Lipid Panel (Completed)    Encounter for Medicare annual wellness exam        Relevant Orders    Lipid Panel (Completed)    Comprehensive metabolic panel (Completed)    CBC w AUTO Differential (Completed)            Follow Up:   Return in about 3 months (around 12/15/2021) for Recheck BP.     An After Visit Summary and PPPS were made available to the patient.              AST (09/09/2021 14:15)  Bilirubin, Total (09/09/2021 14:15)  ALT (09/09/2021 14:15)  Tissue Pathology Exam (09/13/2021 10:31)  Comprehensive metabolic panel (08/10/2021 11:52)  Lipid panel (06/22/2020 15:29)  CBC Auto Differential (03/24/2020 08:46)

## 2021-09-17 ENCOUNTER — LAB (OUTPATIENT)
Dept: FAMILY MEDICINE CLINIC | Facility: CLINIC | Age: 51
End: 2021-09-17

## 2021-09-17 LAB
ALBUMIN SERPL-MCNC: 4.4 G/DL (ref 3.5–5.2)
ALBUMIN/GLOB SERPL: 1.6 G/DL
ALP SERPL-CCNC: 90 U/L (ref 39–117)
ALT SERPL W P-5'-P-CCNC: 9 U/L (ref 1–41)
ANION GAP SERPL CALCULATED.3IONS-SCNC: 12.5 MMOL/L (ref 5–15)
AST SERPL-CCNC: 7 U/L (ref 1–40)
BASOPHILS # BLD AUTO: 0.02 10*3/MM3 (ref 0–0.2)
BASOPHILS NFR BLD AUTO: 0.5 % (ref 0–1.5)
BILIRUB SERPL-MCNC: <0.2 MG/DL (ref 0–1.2)
BUN SERPL-MCNC: 20 MG/DL (ref 6–20)
BUN/CREAT SERPL: 13 (ref 7–25)
CALCIUM SPEC-SCNC: 9.3 MG/DL (ref 8.6–10.5)
CHLORIDE SERPL-SCNC: 105 MMOL/L (ref 98–107)
CHOLEST SERPL-MCNC: 200 MG/DL (ref 0–200)
CO2 SERPL-SCNC: 22.5 MMOL/L (ref 22–29)
CREAT SERPL-MCNC: 1.54 MG/DL (ref 0.76–1.27)
DEPRECATED RDW RBC AUTO: 38.6 FL (ref 37–54)
EOSINOPHIL # BLD AUTO: 0.06 10*3/MM3 (ref 0–0.4)
EOSINOPHIL NFR BLD AUTO: 1.5 % (ref 0.3–6.2)
ERYTHROCYTE [DISTWIDTH] IN BLOOD BY AUTOMATED COUNT: 11.7 % (ref 12.3–15.4)
GFR SERPL CREATININE-BSD FRML MDRD: 48 ML/MIN/1.73
GLOBULIN UR ELPH-MCNC: 2.7 GM/DL
GLUCOSE SERPL-MCNC: 77 MG/DL (ref 65–99)
HCT VFR BLD AUTO: 35.4 % (ref 37.5–51)
HDLC SERPL-MCNC: 67 MG/DL (ref 40–60)
HGB BLD-MCNC: 12 G/DL (ref 13–17.7)
IMM GRANULOCYTES # BLD AUTO: 0.01 10*3/MM3 (ref 0–0.05)
IMM GRANULOCYTES NFR BLD AUTO: 0.2 % (ref 0–0.5)
LDLC SERPL CALC-MCNC: 121 MG/DL (ref 0–100)
LDLC/HDLC SERPL: 1.79 {RATIO}
LYMPHOCYTES # BLD AUTO: 1.28 10*3/MM3 (ref 0.7–3.1)
LYMPHOCYTES NFR BLD AUTO: 31.1 % (ref 19.6–45.3)
MCH RBC QN AUTO: 31.1 PG (ref 26.6–33)
MCHC RBC AUTO-ENTMCNC: 33.9 G/DL (ref 31.5–35.7)
MCV RBC AUTO: 91.7 FL (ref 79–97)
MONOCYTES # BLD AUTO: 0.48 10*3/MM3 (ref 0.1–0.9)
MONOCYTES NFR BLD AUTO: 11.7 % (ref 5–12)
NEUTROPHILS NFR BLD AUTO: 2.26 10*3/MM3 (ref 1.7–7)
NEUTROPHILS NFR BLD AUTO: 55 % (ref 42.7–76)
NRBC BLD AUTO-RTO: 0 /100 WBC (ref 0–0.2)
PLATELET # BLD AUTO: 272 10*3/MM3 (ref 140–450)
PMV BLD AUTO: 9.4 FL (ref 6–12)
POTASSIUM SERPL-SCNC: 4 MMOL/L (ref 3.5–5.2)
PROT SERPL-MCNC: 7.1 G/DL (ref 6–8.5)
RBC # BLD AUTO: 3.86 10*6/MM3 (ref 4.14–5.8)
SODIUM SERPL-SCNC: 140 MMOL/L (ref 136–145)
TRIGL SERPL-MCNC: 67 MG/DL (ref 0–150)
VLDLC SERPL-MCNC: 12 MG/DL (ref 5–40)
WBC # BLD AUTO: 4.11 10*3/MM3 (ref 3.4–10.8)

## 2021-09-17 PROCEDURE — 36415 COLL VENOUS BLD VENIPUNCTURE: CPT | Performed by: FAMILY MEDICINE

## 2021-09-17 PROCEDURE — 85025 COMPLETE CBC W/AUTO DIFF WBC: CPT | Performed by: FAMILY MEDICINE

## 2021-09-17 PROCEDURE — 80061 LIPID PANEL: CPT | Performed by: FAMILY MEDICINE

## 2021-09-17 PROCEDURE — 80053 COMPREHEN METABOLIC PANEL: CPT | Performed by: FAMILY MEDICINE

## 2021-09-22 ENCOUNTER — LAB (OUTPATIENT)
Dept: LAB | Facility: HOSPITAL | Age: 51
End: 2021-09-22

## 2021-09-22 DIAGNOSIS — Z01.818 OTHER SPECIFIED PRE-OPERATIVE EXAMINATION: ICD-10-CM

## 2021-09-22 LAB — SARS-COV-2 ORF1AB RESP QL NAA+PROBE: NOT DETECTED

## 2021-09-22 PROCEDURE — U0004 COV-19 TEST NON-CDC HGH THRU: HCPCS

## 2021-09-22 PROCEDURE — C9803 HOPD COVID-19 SPEC COLLECT: HCPCS

## 2021-09-24 ENCOUNTER — ANESTHESIA EVENT (OUTPATIENT)
Dept: PERIOP | Facility: HOSPITAL | Age: 51
End: 2021-09-24

## 2021-09-24 ENCOUNTER — HOSPITAL ENCOUNTER (OUTPATIENT)
Facility: HOSPITAL | Age: 51
Setting detail: HOSPITAL OUTPATIENT SURGERY
Discharge: HOME | End: 2021-09-24
Attending: UROLOGY | Admitting: UROLOGY

## 2021-09-24 ENCOUNTER — ANESTHESIA (OUTPATIENT)
Dept: PERIOP | Facility: HOSPITAL | Age: 51
End: 2021-09-24

## 2021-09-24 VITALS
OXYGEN SATURATION: 99 % | SYSTOLIC BLOOD PRESSURE: 139 MMHG | DIASTOLIC BLOOD PRESSURE: 91 MMHG | HEART RATE: 73 BPM | RESPIRATION RATE: 16 BRPM | TEMPERATURE: 98 F

## 2021-09-24 DIAGNOSIS — N20.1 LEFT URETERAL CALCULUS: Primary | ICD-10-CM

## 2021-09-24 PROCEDURE — 25010000002 MIDAZOLAM PER 1 MG: Performed by: NURSE ANESTHETIST, CERTIFIED REGISTERED

## 2021-09-24 PROCEDURE — 25010000002 PROPOFOL 10 MG/ML EMULSION: Performed by: NURSE ANESTHETIST, CERTIFIED REGISTERED

## 2021-09-24 PROCEDURE — C1758 CATHETER, URETERAL: HCPCS | Performed by: UROLOGY

## 2021-09-24 PROCEDURE — 25010000002 FENTANYL CITRATE (PF) 50 MCG/ML SOLUTION: Performed by: NURSE ANESTHETIST, CERTIFIED REGISTERED

## 2021-09-24 PROCEDURE — 25010000002 HYDROMORPHONE PER 4 MG: Performed by: NURSE ANESTHETIST, CERTIFIED REGISTERED

## 2021-09-24 PROCEDURE — 25010000003 CEFAZOLIN IN DEXTROSE 2-4 GM/100ML-% SOLUTION: Performed by: UROLOGY

## 2021-09-24 PROCEDURE — C2617 STENT, NON-COR, TEM W/O DEL: HCPCS | Performed by: UROLOGY

## 2021-09-24 PROCEDURE — C1769 GUIDE WIRE: HCPCS | Performed by: UROLOGY

## 2021-09-24 PROCEDURE — 25010000002 ONDANSETRON PER 1 MG: Performed by: NURSE ANESTHETIST, CERTIFIED REGISTERED

## 2021-09-24 DEVICE — URETERAL STENT
Type: IMPLANTABLE DEVICE | Site: URETHRA | Status: FUNCTIONAL
Brand: PERCUFLEX™ PLUS

## 2021-09-24 RX ORDER — DIPHENHYDRAMINE HCL 25 MG
25 CAPSULE ORAL
Status: DISCONTINUED | OUTPATIENT
Start: 2021-09-24 | End: 2021-09-24 | Stop reason: HOSPADM

## 2021-09-24 RX ORDER — FENTANYL CITRATE 50 UG/ML
50 INJECTION, SOLUTION INTRAMUSCULAR; INTRAVENOUS
Status: DISCONTINUED | OUTPATIENT
Start: 2021-09-24 | End: 2021-09-24 | Stop reason: HOSPADM

## 2021-09-24 RX ORDER — MIDAZOLAM HYDROCHLORIDE 1 MG/ML
1 INJECTION INTRAMUSCULAR; INTRAVENOUS
Status: DISCONTINUED | OUTPATIENT
Start: 2021-09-24 | End: 2021-09-24 | Stop reason: HOSPADM

## 2021-09-24 RX ORDER — ONDANSETRON 2 MG/ML
INJECTION INTRAMUSCULAR; INTRAVENOUS AS NEEDED
Status: DISCONTINUED | OUTPATIENT
Start: 2021-09-24 | End: 2021-09-24 | Stop reason: SURG

## 2021-09-24 RX ORDER — HYDROMORPHONE HYDROCHLORIDE 1 MG/ML
0.5 INJECTION, SOLUTION INTRAMUSCULAR; INTRAVENOUS; SUBCUTANEOUS
Status: DISCONTINUED | OUTPATIENT
Start: 2021-09-24 | End: 2021-09-24 | Stop reason: HOSPADM

## 2021-09-24 RX ORDER — HYDROCODONE BITARTRATE AND ACETAMINOPHEN 7.5; 325 MG/1; MG/1
1 TABLET ORAL EVERY 6 HOURS PRN
Qty: 20 TABLET | Refills: 0 | Status: ON HOLD | OUTPATIENT
Start: 2021-09-24 | End: 2021-10-08 | Stop reason: SDUPTHER

## 2021-09-24 RX ORDER — NALOXONE HCL 0.4 MG/ML
0.2 VIAL (ML) INJECTION AS NEEDED
Status: DISCONTINUED | OUTPATIENT
Start: 2021-09-24 | End: 2021-09-24 | Stop reason: HOSPADM

## 2021-09-24 RX ORDER — LABETALOL HYDROCHLORIDE 5 MG/ML
5 INJECTION, SOLUTION INTRAVENOUS
Status: DISCONTINUED | OUTPATIENT
Start: 2021-09-24 | End: 2021-09-24 | Stop reason: HOSPADM

## 2021-09-24 RX ORDER — PROMETHAZINE HYDROCHLORIDE 25 MG/1
25 TABLET ORAL ONCE AS NEEDED
Status: DISCONTINUED | OUTPATIENT
Start: 2021-09-24 | End: 2021-09-24 | Stop reason: HOSPADM

## 2021-09-24 RX ORDER — IBUPROFEN 600 MG/1
600 TABLET ORAL ONCE AS NEEDED
Status: DISCONTINUED | OUTPATIENT
Start: 2021-09-24 | End: 2021-09-24 | Stop reason: HOSPADM

## 2021-09-24 RX ORDER — CEPHALEXIN 500 MG/1
500 CAPSULE ORAL 3 TIMES DAILY
Qty: 12 CAPSULE | Refills: 0 | Status: SHIPPED | OUTPATIENT
Start: 2021-09-24 | End: 2021-09-28

## 2021-09-24 RX ORDER — OXYCODONE AND ACETAMINOPHEN 10; 325 MG/1; MG/1
1 TABLET ORAL EVERY 4 HOURS PRN
Status: DISCONTINUED | OUTPATIENT
Start: 2021-09-24 | End: 2021-09-24 | Stop reason: HOSPADM

## 2021-09-24 RX ORDER — GLYCOPYRROLATE 0.2 MG/ML
INJECTION INTRAMUSCULAR; INTRAVENOUS AS NEEDED
Status: DISCONTINUED | OUTPATIENT
Start: 2021-09-24 | End: 2021-09-24 | Stop reason: SURG

## 2021-09-24 RX ORDER — ONDANSETRON 2 MG/ML
4 INJECTION INTRAMUSCULAR; INTRAVENOUS ONCE AS NEEDED
Status: DISCONTINUED | OUTPATIENT
Start: 2021-09-24 | End: 2021-09-24 | Stop reason: HOSPADM

## 2021-09-24 RX ORDER — HYDROCODONE BITARTRATE AND ACETAMINOPHEN 7.5; 325 MG/1; MG/1
1 TABLET ORAL ONCE AS NEEDED
Status: COMPLETED | OUTPATIENT
Start: 2021-09-24 | End: 2021-09-24

## 2021-09-24 RX ORDER — DIPHENHYDRAMINE HYDROCHLORIDE 50 MG/ML
12.5 INJECTION INTRAMUSCULAR; INTRAVENOUS
Status: DISCONTINUED | OUTPATIENT
Start: 2021-09-24 | End: 2021-09-24 | Stop reason: HOSPADM

## 2021-09-24 RX ORDER — EPHEDRINE SULFATE 50 MG/ML
5 INJECTION, SOLUTION INTRAVENOUS ONCE AS NEEDED
Status: DISCONTINUED | OUTPATIENT
Start: 2021-09-24 | End: 2021-09-24 | Stop reason: HOSPADM

## 2021-09-24 RX ORDER — SODIUM CHLORIDE, SODIUM LACTATE, POTASSIUM CHLORIDE, CALCIUM CHLORIDE 600; 310; 30; 20 MG/100ML; MG/100ML; MG/100ML; MG/100ML
100 INJECTION, SOLUTION INTRAVENOUS CONTINUOUS
Status: DISCONTINUED | OUTPATIENT
Start: 2021-09-24 | End: 2021-09-24 | Stop reason: HOSPADM

## 2021-09-24 RX ORDER — MIDAZOLAM HYDROCHLORIDE 1 MG/ML
INJECTION INTRAMUSCULAR; INTRAVENOUS AS NEEDED
Status: DISCONTINUED | OUTPATIENT
Start: 2021-09-24 | End: 2021-09-24 | Stop reason: SURG

## 2021-09-24 RX ORDER — PROPOFOL 10 MG/ML
VIAL (ML) INTRAVENOUS AS NEEDED
Status: DISCONTINUED | OUTPATIENT
Start: 2021-09-24 | End: 2021-09-24 | Stop reason: SURG

## 2021-09-24 RX ORDER — PROMETHAZINE HYDROCHLORIDE 25 MG/1
25 SUPPOSITORY RECTAL ONCE AS NEEDED
Status: DISCONTINUED | OUTPATIENT
Start: 2021-09-24 | End: 2021-09-24 | Stop reason: HOSPADM

## 2021-09-24 RX ORDER — IPRATROPIUM BROMIDE AND ALBUTEROL SULFATE 2.5; .5 MG/3ML; MG/3ML
3 SOLUTION RESPIRATORY (INHALATION) ONCE AS NEEDED
Status: DISCONTINUED | OUTPATIENT
Start: 2021-09-24 | End: 2021-09-24 | Stop reason: HOSPADM

## 2021-09-24 RX ORDER — LIDOCAINE HYDROCHLORIDE 20 MG/ML
INJECTION, SOLUTION INFILTRATION; PERINEURAL AS NEEDED
Status: DISCONTINUED | OUTPATIENT
Start: 2021-09-24 | End: 2021-09-24 | Stop reason: SURG

## 2021-09-24 RX ORDER — CEFAZOLIN SODIUM 2 G/100ML
2 INJECTION, SOLUTION INTRAVENOUS ONCE
Status: COMPLETED | OUTPATIENT
Start: 2021-09-24 | End: 2021-09-24

## 2021-09-24 RX ORDER — SODIUM CHLORIDE 9 MG/ML
INJECTION, SOLUTION INTRAVENOUS AS NEEDED
Status: DISCONTINUED | OUTPATIENT
Start: 2021-09-24 | End: 2021-09-24 | Stop reason: HOSPADM

## 2021-09-24 RX ORDER — HYDRALAZINE HYDROCHLORIDE 20 MG/ML
5 INJECTION INTRAMUSCULAR; INTRAVENOUS
Status: DISCONTINUED | OUTPATIENT
Start: 2021-09-24 | End: 2021-09-24 | Stop reason: HOSPADM

## 2021-09-24 RX ADMIN — PROPOFOL 150 MG: 10 INJECTION, EMULSION INTRAVENOUS at 09:27

## 2021-09-24 RX ADMIN — MIDAZOLAM 1 MG: 1 INJECTION INTRAMUSCULAR; INTRAVENOUS at 11:05

## 2021-09-24 RX ADMIN — LIDOCAINE HYDROCHLORIDE 60 MG: 20 INJECTION, SOLUTION INFILTRATION; PERINEURAL at 09:27

## 2021-09-24 RX ADMIN — CEFAZOLIN SODIUM 2 G: 2 INJECTION, SOLUTION INTRAVENOUS at 09:33

## 2021-09-24 RX ADMIN — HYDROCODONE BITARTRATE AND ACETAMINOPHEN 1 TABLET: 7.5; 325 TABLET ORAL at 10:30

## 2021-09-24 RX ADMIN — HYDROMORPHONE HYDROCHLORIDE 0.5 MG: 1 INJECTION, SOLUTION INTRAMUSCULAR; INTRAVENOUS; SUBCUTANEOUS at 10:30

## 2021-09-24 RX ADMIN — ONDANSETRON 4 MG: 2 INJECTION INTRAMUSCULAR; INTRAVENOUS at 09:40

## 2021-09-24 RX ADMIN — FENTANYL CITRATE 50 MCG: 50 INJECTION INTRAMUSCULAR; INTRAVENOUS at 10:40

## 2021-09-24 RX ADMIN — SODIUM CHLORIDE, POTASSIUM CHLORIDE, SODIUM LACTATE AND CALCIUM CHLORIDE: 600; 310; 30; 20 INJECTION, SOLUTION INTRAVENOUS at 09:27

## 2021-09-24 RX ADMIN — MIDAZOLAM 1 MG: 1 INJECTION INTRAMUSCULAR; INTRAVENOUS at 09:32

## 2021-09-24 RX ADMIN — GLYCOPYRROLATE 0.2 MG: 0.2 INJECTION INTRAMUSCULAR; INTRAVENOUS at 09:34

## 2021-09-24 NOTE — OP NOTE
Operative Report     [facility]    Patient: Sreedhar Raines  Age:      51 y.o.  :     1970  Sex:      male    Medical Record:  [id number]    Date of Operation/Procedure:  2021    Pre-op Diagnosis:   Left ureteral calculus    Post-Op Diagnosis Codes:  Same    Pre-operative Diagnosis Free Text:  * No pre-op diagnosis entered *     Name of Operation/Procedure:  Procedure(s) and Anesthesia Type:     * LEFT EXTRACORPOREAL SHOCKWAVE LITHOTRIPSY WITH CYSTOSCOPY ANDSTENT PLACEMENT - General    Findings/Complications: Stone found in the mid left ureter  Description of procedure: The patient had a preoperative diagnosis of a left ureteropelvic junction stone.  It had been fairly long period of time since his last imaging however and when he was brought to the [facility] today under fluoroscopic imaging we found his stone in the area of the mid ureter on the left side.    After placing him under general anesthesia he was prepped and draped in a sterile fashion and a flexible cystoscopic examination was carried out.  He had a normal anterior urethra prostate and bladder.  A guidewire and open-ended catheter were placed to the level of the stone.  We were unable to push the stone or irrigate the stone back into the kidney on multiple attempts and therefore needed to treated in situ.    The guidewire was left in place in the treated patient was treated with shockwave lithotripsy.  He received a total of 3000 shocks maximum power setting of 26 KV.  Completion of our treatment a double-J stent was placed over the indwelling guidewire 26 cm x 4.8 [place] patient will follow up with Dr. Raymon Sheehan in our [place] office in 1 week with a KUB  Estimated Blood Loss: none    Specimens: * No orders in the log *    Fluids/Drains: 26 cm x 4.8 [place] double-J stent    Ryan Gomez MD  2021  09:45 EDT

## 2021-09-24 NOTE — ANESTHESIA PROCEDURE NOTES
Airway  Urgency: elective    Date/Time: 9/24/2021 9:29 AM  Airway not difficult    General Information and Staff    Patient location during procedure: OR  Anesthesiologist: Ryan George MD  CRNA: Erika Ngo CRNA    Indications and Patient Condition  Indications for airway management: airway protection    Preoxygenated: yes  MILS maintained throughout      Final Airway Details  Final airway type: supraglottic airway      Successful airway: classic  Size 4    Number of attempts at approach: 1  Assessment: lips, teeth, and gum same as pre-op    Additional Comments  Placed with ease. Vent with ease. Teeth as pre-op. Secured to face.

## 2021-09-24 NOTE — ANESTHESIA PREPROCEDURE EVALUATION
Anesthesia Evaluation     Patient summary reviewed   no history of anesthetic complications:  NPO Solid Status: > 8 hours  NPO Liquid Status: < 2 hours           Airway   Mallampati: II  TM distance: >3 FB  Neck ROM: full  No difficulty expected  Dental    (+) poor dentition    Comment: No loose teeth    Pulmonary     breath sounds clear to auscultation  (-) shortness of breath, recent URI, not a smoker  Cardiovascular     Rhythm: regular  Rate: normal    (+) hyperlipidemia,   (-) past MI, dysrhythmias, angina      Neuro/Psych  (+) seizures (s/p Neurostimulator; continue Rx tx), psychiatric history (Mood disorder), dementia,     (-) CVA    ROS Comment: Pt has CP and is under care of mother    Reports about 1/wk seizure; pt is verbal and will answer qts  GI/Hepatic/Renal/Endo    (+)  hiatal hernia, GERD,  renal disease stones,   (-) diabetes    Musculoskeletal     Abdominal    Substance History      OB/GYN          Other        ROS/Med Hx Other: Cerebral palsy                  Anesthesia Plan    ASA 3     general     intravenous induction     Anesthetic plan, all risks, benefits, and alternatives have been provided, discussed and informed consent has been obtained with: patient.    Plan discussed with CRNA.

## 2021-09-24 NOTE — H&P
FIRST UROLOGY CONSULT      Patient Identification:  NAME:  Sreedhar Raines  Age:  51 y.o.   Sex:  male   :  1970   MRN:  [id number]       Chief complaint: Left flank pain  History of present illness: 51-year-old with a 7 mm stone in the area of the left ureteropelvic junction with moderate hydro-.  CT scan reveals bilateral multiple stones    Past medical history:  Past Medical History:   Diagnosis Date   • Cerebral palsy (CMS/HCC)    • GERD (gastroesophageal reflux disease)    • Hiatal hernia    • Hyperlipidemia    • Kidney stone    • Mood disorder (CMS/HCC)    • Seasonal allergies    • Seizures (CMS/HCC)        Past surgical history:  Past Surgical History:   Procedure Laterality Date   • COLONOSCOPY N/A 2021    Procedure: COLONOSCOPY with polypectomy x 1;  Surgeon: Phoenix Melissa MD;  Location: [facility] ENDOSCOPY;  Service: Gastroenterology;  Laterality: N/A;  colon polyp   • FOOT SURGERY Right    • PLANTAR FASCIA SURGERY Left    • VAGUS NERVE STIMULATOR IMPLANTATION         Allergies:  Briviact [brivaracetam], Clobazam, Depakote [valproic acid], and Tegretol [carbamazepine]    Home medications:  Medications Prior to Admission   Medication Sig Dispense Refill Last Dose   • Cannabidiol (Epidiolex) 100 MG/ML solution Take 6 mL by mouth 2 (two) times a day. 360 mL 5 2021 at Unknown time   • Dilantin 100 MG ER capsule Take 2 capsules by mouth Daily. (Patient taking differently: Take 200 mg by mouth Every Night.) 60 capsule 5 2021 at Unknown time   • lactulose (CHRONULAC) 10 GM/15ML solution Take 30 mL by mouth 2 (Two) Times a Day As Needed (Constipation). 473 mL 1 2021 at Unknown time   • lamoTRIgine (LaMICtal) 100 MG tablet Take 2.5 tablets by mouth 2 (Two) Times a Day. 450 tablet 3 2021 at Unknown time   • levocetirizine (XYZAL) 5 MG tablet Take 1 tablet by mouth Every Evening. 90 tablet 1 2021 at Unknown time   • LORazepam (ATIVAN) 1 MG tablet Take 1 tablet by mouth  Every 8 (Eight) Hours As Needed for Anxiety (Agitation). 60 tablet 0 Past Week at Unknown time   • montelukast (SINGULAIR) 10 MG tablet Take 1 tablet by mouth Every Night. 90 tablet 1 2021 at Unknown time   • risperiDONE (risperDAL) 0.5 MG tablet TAKE 1 TABLET BY MOUTH EVERY NIGHT 30 tablet 5 2021 at Unknown time   • tamsulosin (FLOMAX) 0.4 MG capsule 24 hr capsule Take 1 capsule by mouth Daily. 90 capsule 1 2021 at Unknown time   • zonisamide (ZONEGRAN) 100 MG capsule TAKE 2 CAPSULES BY MOUTH TWICE DAILY 360 capsule 3 2021 at Unknown time   • dicyclomine (Bentyl) 10 MG capsule Take 1 capsule by mouth 3 (Three) Times a Day As Needed (Abdominal Pain). 30 capsule 0 More than a month at Unknown time   • polyethylene glycol (MIRALAX) 17 GM/SCOOP powder Take 17 g by mouth Daily. 850 g 1 More than a month at Unknown time        Hospital medications:  ceFAZolin, 2 g, Intravenous, Once             Family history:  Family History   Problem Relation Age of Onset   • Hypertension Mother    • Hyperlipidemia Mother    • Cancer Father         stomach   • Diabetes Maternal Uncle    • Cancer Maternal Grandmother         multiple myeloma   • Dementia Maternal Grandmother    • Hyperlipidemia Maternal Grandmother    • Alzheimer's disease Paternal Grandfather 70   • Malig Hyperthermia Neg Hx        Social history:  Social History     Tobacco Use   • Smoking status: Never Smoker   • Smokeless tobacco: Never Used   Vaping Use   • Vaping Use: Never used   Substance Use Topics   • Alcohol use: Never   • Drug use: Never       Review of systems:      Positive for:    Negative for: No previous stone episodes    Objective:  TMax 24 hours:   Temp (24hrs), Av.1 °F (36.7 °C), Min:98.1 °F (36.7 °C), Max:98.1 °F (36.7 °C)      Vitals Ranges:   Temp:  [98.1 °F (36.7 °C)] 98.1 °F (36.7 °C)  Heart Rate:  [66] 66  Resp:  [16] 16  BP: (143)/(89) 143/89    Intake/Output Last 3 shifts:  No intake/output data recorded.     Physical  Exam:    General Appearance:    Alert, cooperative, NAD   HEENT:    No trauma, pupils reactive, hearing intact   Back:     No CVA tenderness   Lungs:     Respirations unlabored, no wheezing    Heart:    RRR, intact peripheral pulses   Abdomen:     Soft, NDNT, no masses, no guarding   :    Testes descended bilaterally, no nodules.  Penis normal.  No scrotal or penile rashes noted   Extremities:   No edema, no deformity   Lymphatic:   No neck or groin LAD   Skin:   No bleeding, bruising or rashes   Neuro/Psych:   Orientation intact, mood/affect pleasant, no focal findings       Results review:   I reviewed the patient's new clinical results.    Data review:  Lab Results (last 24 hours)     ** No results found for the last 24 hours. **           Imaging:  Imaging Results (Last 24 Hours)     ** No results found for the last 24 hours. **             Assessment:       * No active hospital problems. *    Left renal calculus  Plan:     Left extracorporeal shockwave lithotripsy    Ryan Gomez MD  09/24/21  08:33 EDT

## 2021-09-24 NOTE — PERIOPERATIVE NURSING NOTE
Pt states he is seeing black spots and can feel his vagus nerve stimulator firing in his neck. Pt and pt's mother both report that that these are the symptoms he usually has when he is about to have a sieuzre. 1mg versed given @ 6297.

## 2021-10-01 ENCOUNTER — HOSPITAL ENCOUNTER (OUTPATIENT)
Dept: GENERAL RADIOLOGY | Facility: HOSPITAL | Age: 51
Discharge: HOME | End: 2021-10-01
Admitting: UROLOGY

## 2021-10-01 ENCOUNTER — TRANSCRIBE ORDERS (OUTPATIENT)
Dept: ADMINISTRATIVE | Facility: HOSPITAL | Age: 51
End: 2021-10-01

## 2021-10-01 DIAGNOSIS — N20.2 CALCULUS OF KIDNEY AND URETER: ICD-10-CM

## 2021-10-01 DIAGNOSIS — N13.30 HYDRONEPHROSIS, UNSPECIFIED HYDRONEPHROSIS TYPE: Primary | ICD-10-CM

## 2021-10-01 DIAGNOSIS — R45.1 AGITATION: ICD-10-CM

## 2021-10-01 DIAGNOSIS — F39 MOOD DISORDER (HCC): ICD-10-CM

## 2021-10-01 DIAGNOSIS — N13.30 HYDRONEPHROSIS, UNSPECIFIED HYDRONEPHROSIS TYPE: ICD-10-CM

## 2021-10-01 PROCEDURE — 74018 RADEX ABDOMEN 1 VIEW: CPT

## 2021-10-04 RX ORDER — RISPERIDONE 0.5 MG/1
0.5 TABLET ORAL NIGHTLY
Qty: 90 TABLET | Refills: 1 | Status: SHIPPED | OUTPATIENT
Start: 2021-10-04 | End: 2021-12-17 | Stop reason: SDUPTHER

## 2021-10-06 ENCOUNTER — LAB (OUTPATIENT)
Dept: LAB | Facility: HOSPITAL | Age: 51
End: 2021-10-06

## 2021-10-06 ENCOUNTER — HOSPITAL ENCOUNTER (OUTPATIENT)
Dept: CARDIOLOGY | Facility: HOSPITAL | Age: 51
Discharge: HOME | End: 2021-10-06

## 2021-10-06 LAB
ANION GAP SERPL CALCULATED.3IONS-SCNC: 9.8 MMOL/L (ref 5–15)
BUN SERPL-MCNC: 15 MG/DL (ref 6–20)
BUN/CREAT SERPL: 11.5 (ref 7–25)
CALCIUM SPEC-SCNC: 8.7 MG/DL (ref 8.6–10.5)
CHLORIDE SERPL-SCNC: 106 MMOL/L (ref 98–107)
CO2 SERPL-SCNC: 24.2 MMOL/L (ref 22–29)
CREAT SERPL-MCNC: 1.31 MG/DL (ref 0.76–1.27)
DEPRECATED RDW RBC AUTO: 41.1 FL (ref 37–54)
ERYTHROCYTE [DISTWIDTH] IN BLOOD BY AUTOMATED COUNT: 11.9 % (ref 12.3–15.4)
GFR SERPL CREATININE-BSD FRML MDRD: 58 ML/MIN/1.73
GLUCOSE SERPL-MCNC: 87 MG/DL (ref 65–99)
HCT VFR BLD AUTO: 36.2 % (ref 37.5–51)
HGB BLD-MCNC: 12.3 G/DL (ref 13–17.7)
MCH RBC QN AUTO: 31.9 PG (ref 26.6–33)
MCHC RBC AUTO-ENTMCNC: 34 G/DL (ref 31.5–35.7)
MCV RBC AUTO: 94 FL (ref 79–97)
PLATELET # BLD AUTO: 239 10*3/MM3 (ref 140–450)
PMV BLD AUTO: 9.9 FL (ref 6–12)
POTASSIUM SERPL-SCNC: 3.7 MMOL/L (ref 3.5–5.2)
RBC # BLD AUTO: 3.85 10*6/MM3 (ref 4.14–5.8)
SARS-COV-2 ORF1AB RESP QL NAA+PROBE: NOT DETECTED
SODIUM SERPL-SCNC: 140 MMOL/L (ref 136–145)
WBC # BLD AUTO: 3.81 10*3/MM3 (ref 3.4–10.8)

## 2021-10-06 PROCEDURE — U0004 COV-19 TEST NON-CDC HGH THRU: HCPCS

## 2021-10-06 PROCEDURE — 93005 ELECTROCARDIOGRAM TRACING: CPT | Performed by: UROLOGY

## 2021-10-06 PROCEDURE — 85027 COMPLETE CBC AUTOMATED: CPT

## 2021-10-06 PROCEDURE — C9803 HOPD COVID-19 SPEC COLLECT: HCPCS

## 2021-10-06 PROCEDURE — 93010 ELECTROCARDIOGRAM REPORT: CPT | Performed by: INTERNAL MEDICINE

## 2021-10-06 PROCEDURE — 80048 BASIC METABOLIC PNL TOTAL CA: CPT

## 2021-10-07 ENCOUNTER — ANESTHESIA EVENT (OUTPATIENT)
Dept: PERIOP | Facility: HOSPITAL | Age: 51
End: 2021-10-07

## 2021-10-07 LAB — QT INTERVAL: 410 MS

## 2021-10-07 NOTE — PAT
Secure chat to cardiology that EKG needs to be read asap for surgery on 10/8/2021.  They said they would have a DrCalin Read it.

## 2021-10-08 ENCOUNTER — ANESTHESIA (OUTPATIENT)
Dept: PERIOP | Facility: HOSPITAL | Age: 51
End: 2021-10-08

## 2021-10-08 ENCOUNTER — HOSPITAL ENCOUNTER (OUTPATIENT)
Facility: HOSPITAL | Age: 51
Setting detail: HOSPITAL OUTPATIENT SURGERY
Discharge: HOME | End: 2021-10-08
Attending: UROLOGY | Admitting: UROLOGY

## 2021-10-08 VITALS
TEMPERATURE: 96.7 F | BODY MASS INDEX: 22.58 KG/M2 | SYSTOLIC BLOOD PRESSURE: 136 MMHG | OXYGEN SATURATION: 100 % | DIASTOLIC BLOOD PRESSURE: 81 MMHG | RESPIRATION RATE: 16 BRPM | HEIGHT: 64 IN | WEIGHT: 132.28 LBS | HEART RATE: 63 BPM

## 2021-10-08 DIAGNOSIS — N20.0 CALCULUS, RENAL: ICD-10-CM

## 2021-10-08 DIAGNOSIS — N20.1 CALCULUS OF URETER: ICD-10-CM

## 2021-10-08 PROCEDURE — C1758 CATHETER, URETERAL: HCPCS | Performed by: UROLOGY

## 2021-10-08 PROCEDURE — C1894 INTRO/SHEATH, NON-LASER: HCPCS | Performed by: UROLOGY

## 2021-10-08 PROCEDURE — 25010000002 DEXAMETHASONE PER 1 MG: Performed by: NURSE ANESTHETIST, CERTIFIED REGISTERED

## 2021-10-08 PROCEDURE — 25010000002 PROPOFOL 10 MG/ML EMULSION: Performed by: NURSE ANESTHETIST, CERTIFIED REGISTERED

## 2021-10-08 PROCEDURE — 25010000002 MIDAZOLAM PER 1 MG: Performed by: ANESTHESIOLOGY

## 2021-10-08 PROCEDURE — 25010000002 FENTANYL CITRATE (PF) 100 MCG/2ML SOLUTION: Performed by: ANESTHESIOLOGY

## 2021-10-08 PROCEDURE — 82365 CALCULUS SPECTROSCOPY: CPT | Performed by: UROLOGY

## 2021-10-08 PROCEDURE — C1769 GUIDE WIRE: HCPCS | Performed by: UROLOGY

## 2021-10-08 PROCEDURE — 25010000002 ONDANSETRON PER 1 MG: Performed by: NURSE ANESTHETIST, CERTIFIED REGISTERED

## 2021-10-08 RX ORDER — HYDROCODONE BITARTRATE AND ACETAMINOPHEN 7.5; 325 MG/1; MG/1
1 TABLET ORAL EVERY 6 HOURS PRN
Qty: 10 TABLET | Refills: 0 | Status: SHIPPED | OUTPATIENT
Start: 2021-10-08 | End: 2023-02-08

## 2021-10-08 RX ORDER — SODIUM CHLORIDE 0.9 % (FLUSH) 0.9 %
10 SYRINGE (ML) INJECTION EVERY 12 HOURS SCHEDULED
Status: DISCONTINUED | OUTPATIENT
Start: 2021-10-08 | End: 2021-10-08 | Stop reason: HOSPADM

## 2021-10-08 RX ORDER — MIDAZOLAM HYDROCHLORIDE 1 MG/ML
INJECTION INTRAMUSCULAR; INTRAVENOUS AS NEEDED
Status: DISCONTINUED | OUTPATIENT
Start: 2021-10-08 | End: 2021-10-08 | Stop reason: SURG

## 2021-10-08 RX ORDER — ONDANSETRON 2 MG/ML
4 INJECTION INTRAMUSCULAR; INTRAVENOUS ONCE AS NEEDED
Status: DISCONTINUED | OUTPATIENT
Start: 2021-10-08 | End: 2021-10-08 | Stop reason: HOSPADM

## 2021-10-08 RX ORDER — FENTANYL CITRATE 50 UG/ML
50 INJECTION, SOLUTION INTRAMUSCULAR; INTRAVENOUS
Status: DISCONTINUED | OUTPATIENT
Start: 2021-10-08 | End: 2021-10-08 | Stop reason: HOSPADM

## 2021-10-08 RX ORDER — SODIUM CHLORIDE, SODIUM LACTATE, POTASSIUM CHLORIDE, CALCIUM CHLORIDE 600; 310; 30; 20 MG/100ML; MG/100ML; MG/100ML; MG/100ML
9 INJECTION, SOLUTION INTRAVENOUS CONTINUOUS PRN
Status: DISCONTINUED | OUTPATIENT
Start: 2021-10-08 | End: 2021-10-08 | Stop reason: HOSPADM

## 2021-10-08 RX ORDER — DROPERIDOL 2.5 MG/ML
1.25 INJECTION, SOLUTION INTRAMUSCULAR; INTRAVENOUS ONCE AS NEEDED
Status: DISCONTINUED | OUTPATIENT
Start: 2021-10-08 | End: 2021-10-08 | Stop reason: HOSPADM

## 2021-10-08 RX ORDER — ACETAMINOPHEN 650 MG/1
650 SUPPOSITORY RECTAL ONCE AS NEEDED
Status: DISCONTINUED | OUTPATIENT
Start: 2021-10-08 | End: 2021-10-08 | Stop reason: HOSPADM

## 2021-10-08 RX ORDER — IPRATROPIUM BROMIDE AND ALBUTEROL SULFATE 2.5; .5 MG/3ML; MG/3ML
3 SOLUTION RESPIRATORY (INHALATION) ONCE AS NEEDED
Status: DISCONTINUED | OUTPATIENT
Start: 2021-10-08 | End: 2021-10-08 | Stop reason: HOSPADM

## 2021-10-08 RX ORDER — LIDOCAINE HYDROCHLORIDE 20 MG/ML
INJECTION, SOLUTION EPIDURAL; INFILTRATION; INTRACAUDAL; PERINEURAL AS NEEDED
Status: DISCONTINUED | OUTPATIENT
Start: 2021-10-08 | End: 2021-10-08 | Stop reason: SURG

## 2021-10-08 RX ORDER — NALOXONE HCL 0.4 MG/ML
0.4 VIAL (ML) INJECTION AS NEEDED
Status: DISCONTINUED | OUTPATIENT
Start: 2021-10-08 | End: 2021-10-08 | Stop reason: HOSPADM

## 2021-10-08 RX ORDER — SODIUM CHLORIDE 0.9 % (FLUSH) 0.9 %
10 SYRINGE (ML) INJECTION AS NEEDED
Status: DISCONTINUED | OUTPATIENT
Start: 2021-10-08 | End: 2021-10-08 | Stop reason: HOSPADM

## 2021-10-08 RX ORDER — FENTANYL CITRATE 50 UG/ML
INJECTION, SOLUTION INTRAMUSCULAR; INTRAVENOUS AS NEEDED
Status: DISCONTINUED | OUTPATIENT
Start: 2021-10-08 | End: 2021-10-08 | Stop reason: SURG

## 2021-10-08 RX ORDER — PROPOFOL 10 MG/ML
VIAL (ML) INTRAVENOUS AS NEEDED
Status: DISCONTINUED | OUTPATIENT
Start: 2021-10-08 | End: 2021-10-08 | Stop reason: SURG

## 2021-10-08 RX ORDER — CIPROFLOXACIN 500 MG/1
500 TABLET, FILM COATED ORAL
Status: COMPLETED | OUTPATIENT
Start: 2021-10-08 | End: 2021-10-08

## 2021-10-08 RX ORDER — ACETAMINOPHEN 325 MG/1
650 TABLET ORAL ONCE AS NEEDED
Status: DISCONTINUED | OUTPATIENT
Start: 2021-10-08 | End: 2021-10-08 | Stop reason: HOSPADM

## 2021-10-08 RX ORDER — SODIUM CHLORIDE, SODIUM LACTATE, POTASSIUM CHLORIDE, CALCIUM CHLORIDE 600; 310; 30; 20 MG/100ML; MG/100ML; MG/100ML; MG/100ML
100 INJECTION, SOLUTION INTRAVENOUS CONTINUOUS
Status: DISCONTINUED | OUTPATIENT
Start: 2021-10-08 | End: 2021-10-08 | Stop reason: HOSPADM

## 2021-10-08 RX ORDER — DEXAMETHASONE SODIUM PHOSPHATE 4 MG/ML
INJECTION, SOLUTION INTRA-ARTICULAR; INTRALESIONAL; INTRAMUSCULAR; INTRAVENOUS; SOFT TISSUE AS NEEDED
Status: DISCONTINUED | OUTPATIENT
Start: 2021-10-08 | End: 2021-10-08 | Stop reason: SURG

## 2021-10-08 RX ORDER — HYDROCODONE BITARTRATE AND ACETAMINOPHEN 7.5; 325 MG/1; MG/1
1 TABLET ORAL ONCE
Status: COMPLETED | OUTPATIENT
Start: 2021-10-08 | End: 2021-10-08

## 2021-10-08 RX ORDER — ONDANSETRON 2 MG/ML
INJECTION INTRAMUSCULAR; INTRAVENOUS AS NEEDED
Status: DISCONTINUED | OUTPATIENT
Start: 2021-10-08 | End: 2021-10-08 | Stop reason: SURG

## 2021-10-08 RX ORDER — CEFDINIR 300 MG/1
300 CAPSULE ORAL 2 TIMES DAILY
Qty: 10 CAPSULE | Refills: 0 | Status: SHIPPED | OUTPATIENT
Start: 2021-10-08 | End: 2021-12-09

## 2021-10-08 RX ORDER — FENTANYL CITRATE 50 UG/ML
25 INJECTION, SOLUTION INTRAMUSCULAR; INTRAVENOUS
Status: DISCONTINUED | OUTPATIENT
Start: 2021-10-08 | End: 2021-10-08 | Stop reason: HOSPADM

## 2021-10-08 RX ORDER — MEPERIDINE HYDROCHLORIDE 25 MG/ML
12.5 INJECTION INTRAMUSCULAR; INTRAVENOUS; SUBCUTANEOUS
Status: DISCONTINUED | OUTPATIENT
Start: 2021-10-08 | End: 2021-10-08 | Stop reason: HOSPADM

## 2021-10-08 RX ORDER — EPHEDRINE SULFATE 50 MG/ML
5 INJECTION, SOLUTION INTRAVENOUS ONCE AS NEEDED
Status: DISCONTINUED | OUTPATIENT
Start: 2021-10-08 | End: 2021-10-08 | Stop reason: HOSPADM

## 2021-10-08 RX ADMIN — PROPOFOL 200 MG: 10 INJECTION, EMULSION INTRAVENOUS at 07:01

## 2021-10-08 RX ADMIN — HYDROCODONE BITARTRATE AND ACETAMINOPHEN 1 TABLET: 7.5; 325 TABLET ORAL at 08:45

## 2021-10-08 RX ADMIN — FENTANYL CITRATE 100 MCG: 50 INJECTION, SOLUTION INTRAMUSCULAR; INTRAVENOUS at 07:00

## 2021-10-08 RX ADMIN — SODIUM CHLORIDE, POTASSIUM CHLORIDE, SODIUM LACTATE AND CALCIUM CHLORIDE 9 ML/HR: 600; 310; 30; 20 INJECTION, SOLUTION INTRAVENOUS at 06:48

## 2021-10-08 RX ADMIN — MIDAZOLAM 2 MG: 1 INJECTION INTRAMUSCULAR; INTRAVENOUS at 07:03

## 2021-10-08 RX ADMIN — ONDANSETRON 4 MG: 2 INJECTION INTRAMUSCULAR; INTRAVENOUS at 07:13

## 2021-10-08 RX ADMIN — CIPROFLOXACIN 500 MG: 500 TABLET, FILM COATED ORAL at 06:30

## 2021-10-08 RX ADMIN — DEXAMETHASONE SODIUM PHOSPHATE 4 MG: 4 INJECTION, SOLUTION INTRAMUSCULAR; INTRAVENOUS at 07:10

## 2021-10-08 RX ADMIN — LIDOCAINE HYDROCHLORIDE 100 MG: 20 INJECTION, SOLUTION EPIDURAL; INFILTRATION; INTRACAUDAL; PERINEURAL at 07:01

## 2021-10-08 NOTE — OP NOTE
CYSTOSCOPY URETEROSCOPY RETROGRADE PYELOGRAM HOLMIUM LASER STENT INSERTION  Procedure Report    Patient Name:  Sreedhar Raines  YOB: 1970    Date of Surgery:  10/8/2021     Indications: 51-year-old gentleman with a 7 mm left ureteral calculus who underwent ESWL.  Stone unfortunately did not break up very well and so he is presenting for ureteroscopic treatment.  Of note he also has a 4 mm stone in his left kidney.    Pre-op Diagnosis:   Calculus of ureter [N20.1]  Calculus, renal [N20.0]       Post-Op Diagnosis Codes:     * Calculus of ureter [N20.1]     * Calculus, renal [N20.0] left    Procedure/CPT® Codes:      Procedure(s):  CYSTOSCOPY, left URETEROSCOPic STONE BASKET EXTRACTION, left ureteral STENT INSERTION    Staff:  Surgeon(s):  Raymon Sheehan MD            was responsible for performing the following activities: None and their skilled assistance was necessary for the success of this case.    Anesthesia: General    Estimated Blood Loss: minimal    Implants:    Nothing was implanted during the procedure    Specimen:          ID Type Source Tests Collected by Time   A (Not marked as sent) :  Tissue Kidney, Left TISSUE PATHOLOGY EXAM Raymon Sheehan MD 10/8/2021 0732         Findings: Fragmented stone in left ureter.  Approximately 2 4 mm stones in the left lower pole    Complications: None    Description of Procedure: Patient induced with general anesthesia and placed in dorsolithotomy position.  Prepped and draped in sterile fashion.  22 Syri[place] cystoscope introduced under direct vision.  Urethra was within normal limits.  Prostate showed some mild trilobar hypertrophy.  Bladder shows mild trabeculation throughout.  Stent to the left ureteral orifice was grasped brought to meatus.  Guidewire was passed through the stent and up into the left kidney under fluoroscopy.  Rigid ureteroscope was then passed next to the wire and up to the level of the stones in the proximal left ureter.  Stones had  fragmented somewhat with shockwave lithotripsy and these were able to be removed using a nitinol basket.  No other stones were seen in the ureter once these have been removed.  A second wire was placed.  Ureteral sheath was passed over the working wire and the flexible ureteroscope was passed up into the left kidney.  There were 2 approximately 4 mm stone seen in the lower pole of the left kidney.  These were both grasped using a nitinol basket and removed.  At this point no other stones were seen.  The ureteroscope and sheath were removed.  The ureter was widely dilated and there was no significant bleeding.  Therefore it was decided not to replace the stent.  The remaining indwelling wire was removed.  The patient's bladder was emptied and the cystoscope was removed.  Patient was taken out of the dorsolithotomy position and awakened from general anesthesia.  Transported to the postanesthesia care unit in stable condition having tolerated the procedure well without any complications.      Raymon Sheehan MD     Date: 10/8/2021  Time: 07:32 EDT

## 2021-10-08 NOTE — ANESTHESIA POSTPROCEDURE EVALUATION
Patient: Sreedhar Raines    Procedure Summary     Date: 10/08/21 Room / Location: [facility] OR 06 / [facility] MAIN OR    Anesthesia Start: 0700 Anesthesia Stop: 0736    Procedure: CYSTOSCOPY URETEROSCOPY, STONE BASKET EXTRACTION,STENT EXTRACTION (Left ) Diagnosis:       Calculus of ureter      Calculus, renal      (Calculus of ureter [N20.1])      (Calculus, renal [N20.0])    Surgeons: Raymon Sheehan MD Provider: Harinder Friedman DO    Anesthesia Type: general ASA Status: 3          Anesthesia Type: general    Vitals  Vitals Value Taken Time   /75 10/08/21 0819   Temp 97.3 °F (36.3 °C) 10/08/21 0818   Pulse 66 10/08/21 0820   Resp 15 10/08/21 0818   SpO2 100 % 10/08/21 0820   Vitals shown include unvalidated device data.        Post Anesthesia Care and Evaluation    Patient location during evaluation: PACU  Patient participation: complete - patient participated  Level of consciousness: awake  Pain scale: See nurse's notes for pain score.  Pain management: adequate  Airway patency: patent  Anesthetic complications: No anesthetic complications  PONV Status: none  Cardiovascular status: acceptable  Respiratory status: acceptable  Hydration status: acceptable    Comments: Patient seen and examined postoperatively; vital signs stable; SpO2 greater than or equal to 90%; cardiopulmonary status stable; nausea/vomiting adequately controlled; pain adequately controlled; no apparent anesthesia complications; patient discharged from anesthesia care when discharge criteria were met

## 2021-10-08 NOTE — H&P
Urology History and Physical    Patient:Sreedhar Raines  :1970   Room:[facility] MAIN OR/MAIN OR   Admit Date10/2021  Age:51 y.o.      SEX:male      DOS:10/8/2021      MR:[id number]      Visit:[id number]       Chief complaint left flank pain    Subjective     Patient is a 51 y.o. male presents with left ureteral calculus.  Patient has a 7 mm stone in his left ureteropelvic junction.  He underwent shockwave lithotripsy.  However a follow-up KUB on  showed a 6 mm stone in the mid left ureter next to the stent as well as a 4 mm stone in the lower pole of the left kidney.  There are also some stones on the right side.  Patient now presents for removal of left ureteral and renal calculi.    Review of Systems  10 point review of systems were reviewed and are negative except for:  Constitution:  positive for See HPI    History  Past Medical History:   Diagnosis Date   • Cerebral palsy (HCC)    • GERD (gastroesophageal reflux disease)    • Hiatal hernia    • Hyperlipidemia    • Kidney stone    • Mood disorder (HCC)    • Seasonal allergies    • Seizures (HCC)      Past Surgical History:   Procedure Laterality Date   • COLONOSCOPY N/A 2021    Procedure: COLONOSCOPY with polypectomy x 1;  Surgeon: Phoenix Melissa MD;  Location: [facility] ENDOSCOPY;  Service: Gastroenterology;  Laterality: N/A;  colon polyp   • EXTRACORPOREAL SHOCKWAVE LITHOTRIPSY (ESWL), STENT INSERTION/REMOVAL Left 2021    Procedure: LEFT EXTRACORPOREAL SHOCKWAVE LITHOTRIPSY WITH CYSTOSCOPY ANDSTENT PLACEMENT;  Surgeon: Ryan Gomez MD;  Location:  [facility];  Service: Urology;  Laterality: Left;   • FOOT SURGERY Right    • PLANTAR FASCIA SURGERY Left    • VAGUS NERVE STIMULATOR IMPLANTATION       Social History     Socioeconomic History   • Marital status: Single     Spouse name: Not on file   • Number of children: Not on file   • Years of education: Not on file   • Highest education level: Not on file   Tobacco Use   •  Smoking status: Never Smoker   • Smokeless tobacco: Never Used   Vaping Use   • Vaping Use: Never used   Substance and Sexual Activity   • Alcohol use: Never   • Drug use: Never   • Sexual activity: Never     Family History   Problem Relation Age of Onset   • Hypertension Mother    • Hyperlipidemia Mother    • Cancer Father         stomach   • Diabetes Maternal Uncle    • Cancer Maternal Grandmother         multiple myeloma   • Dementia Maternal Grandmother    • Hyperlipidemia Maternal Grandmother    • Alzheimer's disease Paternal Grandfather 70   • Malig Hyperthermia Neg Hx      Allergy  Allergies   Allergen Reactions   • Briviact [Brivaracetam] Other (See Comments)     Violent behavior    • Clobazam Other (See Comments)     Trouble with walking and coordination  Couldn't think straight    • Depakote [Valproic Acid] Unknown (See Comments)     unknown   • Tegretol [Carbamazepine] Unknown (See Comments)     unknown     Prior to Admission medications    Medication Sig Start Date End Date Taking? Authorizing Provider   Cannabidiol (Epidiolex) 100 MG/ML solution Take 6 mL by mouth 2 (two) times a day.  Patient taking differently: Take 6 mL by mouth 2 (two) times a day. Hold DOS 9/9/21  Yes Payton Harrington MD   dicyclomine (Bentyl) 10 MG capsule Take 1 capsule by mouth 3 (Three) Times a Day As Needed (Abdominal Pain). 8/10/21  Yes Concepción Sandra MD   Dilantin 100 MG ER capsule Take 2 capsules by mouth Daily.  Patient taking differently: Take 200 mg by mouth Every Night. 3/23/21  Yes Payton Harrington MD   HYDROcodone-acetaminophen (NORCO) 7.5-325 MG per tablet Take 1 tablet by mouth Every 6 (Six) Hours As Needed for Moderate Pain  (Pain). 9/24/21  Yes Ryan Gomez MD   lactulose (CHRONULAC) 10 GM/15ML solution Take 30 mL by mouth 2 (Two) Times a Day As Needed (Constipation). 8/13/21  Yes Concepción Sandra MD   lamoTRIgine (LaMICtal) 100 MG tablet Take 2.5 tablets by mouth 2 (Two)  Times a Day. 21  Yes Payton Harrington MD   levocetirizine (XYZAL) 5 MG tablet Take 1 tablet by mouth Every Evening. 21  Yes Concepción Sandra MD   LORazepam (ATIVAN) 1 MG tablet Take 1 tablet by mouth Every 8 (Eight) Hours As Needed for Anxiety (Agitation). 21  Yes Concepción Sandra MD   montelukast (SINGULAIR) 10 MG tablet Take 1 tablet by mouth Every Night. 21  Yes Concepción Sandra MD   polyethylene glycol (MIRALAX) 17 GM/SCOOP powder Take 17 g by mouth Daily.  Patient taking differently: Take 17 g by mouth Daily As Needed. 8/10/21  Yes Concepción Sandra MD   risperiDONE (risperDAL) 0.5 MG tablet Take 1 tablet by mouth Every Night. 10/4/21  Yes Concepción Sandra MD   tamsulosin (FLOMAX) 0.4 MG capsule 24 hr capsule Take 1 capsule by mouth Daily. 9/15/21  Yes Concepción Sandra MD   zonisamide (ZONEGRAN) 100 MG capsule TAKE 2 CAPSULES BY MOUTH TWICE DAILY 2/15/21  Yes Payton Harrington MD         Objective     tMax 24 hours:  Temp (24hrs), Av.8 °F (36.6 °C), Min:97.8 °F (36.6 °C), Max:97.8 °F (36.6 °C)    Vital Sign Ranges:  Temp:  [97.8 °F (36.6 °C)] 97.8 °F (36.6 °C)  Heart Rate:  [70] 70  Resp:  [14] 14  BP: (117)/(80) 117/80  Intake and Output Last 3 Shifts:  No intake/output data recorded.    Physical Exam:   General Appearance alert, appears stated age and cooperative  Head normocephalic, without obvious abnormality and atraumatic  Eyes lids and lashes normal, conjunctivae and sclerae normal, no icterus, no pallor and corneas clear  Lungs respirations regular, respirations even and respirations unlabored  Heart regular rhythm & normal rate  Abdomen soft non-tender, no guarding and no rebound tenderness  Extremities moves extremities well, no edema, no cyanosis and no redness  Skin no bleeding, bruising or rash  Neurologic Mental Status orientated to person, place, time and situation    Results Review:     Lab Results (last 24 hours)     ** No  results found for the last 24 hours. **         No results found for: URINECX     Imaging Results (Last 7 Days)     ** No results found for the last 168 hours. **          Inpatient Meds:   Scheduled Meds:ciprofloxacin, 500 mg, Oral, 30 Min Pre-Op  sodium chloride, 10 mL, Intravenous, Q12H       Continuous Infusions:lactated ringers, 9 mL/hr       PRN Meds:.lactated ringers  •  sodium chloride      Assessment/Plan     Active Problems:    * No active hospital problems. *    Left ureteral and renal calculi which did not respond well to ESWL.    Plan  Cystoscopy, left ureteroscopic stone extraction possibly with laser lithotripsy, left ureteral stent removal versus exchange.  Risks, benefits, and alternatives have been discussed with the patient.      I discussed the patients findings and my recommendations with patient    Raymon Sheehan MD  10/08/21  06:29 EDT

## 2021-10-08 NOTE — ANESTHESIA PROCEDURE NOTES
Airway  Urgency: elective    Date/Time: 10/8/2021 7:03 AM  Airway not difficult    General Information and Staff    Patient location during procedure: OR  Anesthesiologist: Harinder Friedman DO    Indications and Patient Condition  Indications for airway management: airway protection    Preoxygenated: yes  MILS maintained throughout  Mask difficulty assessment: 0 - not attempted    Final Airway Details  Final airway type: supraglottic airway      Successful airway: unique  Size 4    Number of attempts at approach: 1  Assessment: lips, teeth, and gum same as pre-op and atraumatic intubation

## 2021-10-08 NOTE — ANESTHESIA PREPROCEDURE EVALUATION
Anesthesia Evaluation     Patient summary reviewed and Nursing notes reviewed   NPO Solid Status: > 6 hours  NPO Liquid Status: > 6 hours           Airway   Mallampati: I  TM distance: >3 FB  Neck ROM: full  No difficulty expected  Dental - normal exam     Pulmonary - negative pulmonary ROS and normal exam   Cardiovascular - normal exam    (+) hyperlipidemia,       Neuro/Psych  (+) seizures, psychiatric history,     GI/Hepatic/Renal/Endo    (+)  hiatal hernia, GERD,  renal disease,     Musculoskeletal (-) negative ROS    Abdominal  - normal exam    Bowel sounds: normal.   Substance History - negative use     OB/GYN negative ob/gyn ROS         Other                        Anesthesia Plan    ASA 3     general   (Versed at end of case to try to avoid seizure)  intravenous induction     Anesthetic plan, all risks, benefits, and alternatives have been provided, discussed and informed consent has been obtained with: patient.    Plan discussed with CRNA and CAA.

## 2021-10-11 NOTE — TELEPHONE ENCOUNTER
Called pt. Spoke with pt's mother. She stated understanding and thanks.   
How many seizures has he had?  Did his unsteadiness get better?  I would probably increase either the lamictal (currently 200 mg BID) or zonegran (currently 100 mg BID) instead.  
Let's increase lamictal to 250 mg in the morning and 200 mg at night for 2 weeks, then 250 mg BID.  I will send in a new prescription.  Thanks!  
Please review previous message.   Thank you.   
Provider: DR. JONES  Caller: FRANCISCO J KILGORE  Relationship to Patient: PT'S MOM  Phone Number: [phone]        Reason for Call: PT'S MOM CALLED DUE TO WHEN PT SEEN DR. JONES ON 1-126-2020 AND DECREASED THE PT'S DILANTIN THAT AFTER ABOUT A WEEK THAT THE PT HAD STAT HAVING GRANMA SEIZURES AND SO FAR HAS HAD 5. SHE IS ASKING IF THE MEDICATION CAN BE INCREASED BACK UP TO THE DOSE HE WAS ON BEFORE OR DOES HE NEED TO BE SEEN.  PT NEXT APPT WITH DR. JONES IS ON 7/20/2020    PLEASE CALL HER BACK [phone]    
Spoke with pt's mother. She states pt has had 5 seizures since decreasing the dilantin. However, he is no longer having any issues with the unsteadiness. She said that they are not opposed to increasing either of the medications and would like your recommendation on which one to increase, and what to increase the dose to.    Please review.  Thank you.   
Psychological preparation for procedure was provided through pictures and medical materials. Parental support and preparation was provided.

## 2021-10-14 LAB
CALCIUM OXALATE DIHYDRATE MFR STONE IR: 10 %
COLOR STONE: NORMAL
COM MFR STONE: 40 %
COMPN STONE: NORMAL
HYDROXYAPATITE 24H ENGDIFF UR: 50 %
LABORATORY COMMENT REPORT: NORMAL
Lab: NORMAL
Lab: NORMAL
PHOTO: NORMAL
SIZE STONE: NORMAL MM
SPEC SOURCE SUBJ: NORMAL
WT STONE: 97 MG

## 2021-12-09 ENCOUNTER — OFFICE VISIT (OUTPATIENT)
Dept: NEUROLOGY | Facility: CLINIC | Age: 51
End: 2021-12-09

## 2021-12-09 VITALS
OXYGEN SATURATION: 99 % | HEIGHT: 64 IN | BODY MASS INDEX: 24.11 KG/M2 | HEART RATE: 70 BPM | SYSTOLIC BLOOD PRESSURE: 120 MMHG | DIASTOLIC BLOOD PRESSURE: 78 MMHG | WEIGHT: 141.2 LBS

## 2021-12-09 DIAGNOSIS — G40.814 INTRACTABLE LENNOX-GASTAUT SYNDROME WITHOUT STATUS EPILEPTICUS (HCC): Primary | ICD-10-CM

## 2021-12-09 DIAGNOSIS — Z46.2 ENCOUNTER FOR INTERROGATION OF NEUROSTIMULATOR: ICD-10-CM

## 2021-12-09 PROCEDURE — 95970 ALYS NPGT W/O PRGRMG: CPT | Performed by: PSYCHIATRY & NEUROLOGY

## 2021-12-09 PROCEDURE — 99215 OFFICE O/P EST HI 40 MIN: CPT | Performed by: PSYCHIATRY & NEUROLOGY

## 2021-12-09 NOTE — PATIENT INSTRUCTIONS
[facility]  Payton Harrington MD  Neurology clinic  [phone]    With anti-seizure medications, you may initially notice side effects of fatigue, drowsiness, unsteadiness, and dizziness.  Other possible side effects include nausea, abdominal pain, headache, blurry or double vision, slurred speech and mood changes.  Generally, patients will noticed these symptoms when the medication is first started or with higher doses and will go away with time.    It is import to consistently take your medication every day.  Missing just one dose may put you at risk for a breakthrough seizure.  Consider using reminders on your phone or a pill box.    If you develop a rash, please call the neurology clinic immediately or notify another healthcare professional, as this may be potentially life-threatening.  If you are unable to reach a healthcare professional, go to the emergency room immediately for further evaluation.    If you develop thoughts of wanting to hurt yourself or others, please call the neurology clinic immediately to notify another healthcare professional.  If you are unable to reach a healthcare professional, go to the emergency room immediately for further evaluation.    It is the [place] state law that you cannot drive within 90 days of a seizure.    You should avoid certain activities that if you were to have a seizure, you could harm yourself or others. In general, it is recommended that you avoid operating heavy machinery or power tools, swimming or taking baths by yourself (showers are ok), don't stand over open flames, don't get on high ladders or the roof.  I also recommend to avoid sleeping on your stomach.    For further information on epilepsy and resources available to patients and their families, please visit the [facility] at www.efky.org or call [phone].    **Check out the [facility]'s monthly Art Group Gathering.  They are  located at [facility], [address] [place] [address].  Call Molly Hebert at [phone] or email her at [email or web address] for the dates of future gatherings.**      **If you have having memory problems, consider HOBSCOTCH (Home-Based Self-management and Cognitive Training Changes lives).  It is an 8 week self-management program for adults with epilepsy and memory problems.  The program is free at the [facility].  Contact Love Rojas at [phone] or [email or web address].**

## 2021-12-09 NOTE — PROGRESS NOTES
Subjective:     Patient ID: Sreedhar Raines is a 51 y.o. male.    Kalpesh is a 51-year-old male with history of allergies, osteoporosis, developmental delay, intractable epilepsy status post VNS, and cerebral palsy who presents to the neurology clinic today as an established patient for follow-up for seizures.  The patient was last seen on September 9, 2021.  He was a new patient back in October 2019.  The patient has failed Depakote, carbamazepine, levetiracetam, topiramate, Briviact, Onfi, and phenobarbital.  He is currently on name brand only 200 mg at night.  He was on higher doses but we have been trying to wean him off the medication.  He is also on lamotrigine 250 mg twice a day, Zonegran 200 mg twice a day and Epidiolex 6 mL twice a day.  He did have an initial honeymoon period with Epidiolex however his seizure frequency has gone back to his typical frequency of 3 to 5/month.  Since his last visit he has had 9 seizures.  He also had surgeries for kidney stones.      The following portions of the patient's history were reviewed and updated as appropriate: allergies, current medications, past family history, past medical history, past social history, past surgical history and problem list.    Review of Systems     Objective:    Neurologic Exam    Physical Exam   I interrogated the patient's VNS today.  They tolerated the procedure well without any adverse side effects.  Below are the current settings.        Assessment/Plan:    Kalpesh is a 51-year-old male with history of allergies, osteoporosis, kidney stones, developmental delay, intractable epilepsy status post VNS, and cerebral palsy who presents today for follow-up.    1.  Intractable epilepsy-his clinical presentation seem most consistent with an epileptic encephalopathy such as Lennox-Gastaut syndrome.  Unfortunately he continues to have multiple seizure a month on his current polytherapy regimen.  I recommend further seizure classification and localization  in the epilepsy monitoring unit to help direct care.  In the future, we could consider medications like Fycompa, Banzel, Trileptal, Vimpat, Aptiom or Xcopri however I would be concerned about the more narrow spectrum medications.  He will need to have his liver enzymes rechecked in March due to the Epidiolex.  Ideally I think I would want him off the zonisamide given his history of kidney stones as well as the phenytoin due to his bone loss.  I will see the patient back after his EMU stay.    A total of 40 minutes of time was spent on this encounter today.  This includes reviewing the patient's records, face-to-face time, documentation.       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

## 2021-12-15 ENCOUNTER — TELEPHONE (OUTPATIENT)
Dept: NEUROLOGY | Facility: CLINIC | Age: 51
End: 2021-12-15

## 2021-12-16 ENCOUNTER — TELEPHONE (OUTPATIENT)
Dept: NEUROLOGY | Facility: CLINIC | Age: 51
End: 2021-12-16

## 2021-12-16 DIAGNOSIS — G80.2 SPASTIC HEMIPLEGIC CEREBRAL PALSY (HCC): ICD-10-CM

## 2021-12-16 DIAGNOSIS — R45.1 AGITATION: ICD-10-CM

## 2021-12-16 DIAGNOSIS — F39 MOOD DISORDER (HCC): ICD-10-CM

## 2021-12-16 DIAGNOSIS — N20.0 NEPHROLITHIASIS: ICD-10-CM

## 2021-12-16 NOTE — TELEPHONE ENCOUNTER
Provider: DR. JONES    Caller: FRANCISCO J    Relationship to Patient: MOTHER    Phone Number: [phone]    Reason for Call: PT'S MOTHER CALLING TO STATE THAT DR. JONES TOLD HER TO CALL US IF SHE HADN'T HEARD ANYTHING IN REGARDS TO GETTING THE EEG SCHEDULED.  PT'S MOTHER STATES THEY HAVE NOT HEARD FROM ANYONE.  WAS ABLE TO WARM TRANSFER PT'S MOTHER TO CENTRAL SCHEDULING.

## 2021-12-17 ENCOUNTER — OFFICE VISIT (OUTPATIENT)
Dept: FAMILY MEDICINE CLINIC | Facility: CLINIC | Age: 51
End: 2021-12-17

## 2021-12-17 VITALS
BODY MASS INDEX: 24.53 KG/M2 | HEART RATE: 72 BPM | SYSTOLIC BLOOD PRESSURE: 115 MMHG | OXYGEN SATURATION: 99 % | WEIGHT: 143 LBS | DIASTOLIC BLOOD PRESSURE: 76 MMHG | TEMPERATURE: 98.2 F

## 2021-12-17 DIAGNOSIS — F39 MOOD DISORDER: ICD-10-CM

## 2021-12-17 DIAGNOSIS — G80.2 SPASTIC HEMIPLEGIC CEREBRAL PALSY: Primary | ICD-10-CM

## 2021-12-17 DIAGNOSIS — R45.1 AGITATION: ICD-10-CM

## 2021-12-17 PROCEDURE — 99214 OFFICE O/P EST MOD 30 MIN: CPT | Performed by: FAMILY MEDICINE

## 2021-12-17 RX ORDER — TAMSULOSIN HYDROCHLORIDE 0.4 MG/1
1 CAPSULE ORAL DAILY
Qty: 90 CAPSULE | Refills: 1 | Status: SHIPPED | OUTPATIENT
Start: 2021-12-17 | End: 2022-09-07

## 2021-12-17 RX ORDER — RISPERIDONE 1 MG/1
1 TABLET ORAL NIGHTLY
Qty: 90 TABLET | Refills: 1 | Status: SHIPPED | OUTPATIENT
Start: 2021-12-17 | End: 2022-03-23 | Stop reason: SDUPTHER

## 2021-12-17 RX ORDER — LORAZEPAM 1 MG/1
1 TABLET ORAL EVERY 8 HOURS PRN
Qty: 60 TABLET | Refills: 0 | Status: SHIPPED | OUTPATIENT
Start: 2021-12-17 | End: 2022-05-06

## 2021-12-18 DIAGNOSIS — J30.89 ENVIRONMENTAL AND SEASONAL ALLERGIES: ICD-10-CM

## 2021-12-21 RX ORDER — MONTELUKAST SODIUM 10 MG/1
10 TABLET ORAL NIGHTLY
Qty: 90 TABLET | Refills: 1 | Status: SHIPPED | OUTPATIENT
Start: 2021-12-21 | End: 2022-06-16

## 2021-12-21 RX ORDER — PHENYTOIN SODIUM 100 MG/1
200 CAPSULE, EXTENDED RELEASE ORAL NIGHTLY
Qty: 60 CAPSULE | Refills: 5 | Status: SHIPPED | OUTPATIENT
Start: 2021-12-21 | End: 2022-06-16

## 2022-01-05 ENCOUNTER — TELEPHONE (OUTPATIENT)
Dept: NEUROLOGY | Facility: CLINIC | Age: 52
End: 2022-01-05

## 2022-01-05 NOTE — TELEPHONE ENCOUNTER
[employer] message sent to patient and family letting them know number for [facility] scheduling

## 2022-02-16 DIAGNOSIS — J30.89 ENVIRONMENTAL AND SEASONAL ALLERGIES: ICD-10-CM

## 2022-02-16 RX ORDER — LAMOTRIGINE 100 MG/1
TABLET ORAL
Qty: 450 TABLET | Refills: 3 | Status: SHIPPED | OUTPATIENT
Start: 2022-02-16 | End: 2023-02-13

## 2022-02-16 RX ORDER — LEVOCETIRIZINE DIHYDROCHLORIDE 5 MG/1
5 TABLET, FILM COATED ORAL EVERY EVENING
Qty: 90 TABLET | Refills: 1 | Status: SHIPPED | OUTPATIENT
Start: 2022-02-16 | End: 2022-08-15

## 2022-03-21 RX ORDER — CANNABIDIOL 100 MG/ML
SOLUTION ORAL
Qty: 360 ML | Refills: 5 | Status: SHIPPED | OUTPATIENT
Start: 2022-03-21 | End: 2023-02-08

## 2022-03-23 ENCOUNTER — OFFICE VISIT (OUTPATIENT)
Dept: FAMILY MEDICINE CLINIC | Facility: CLINIC | Age: 52
End: 2022-03-23

## 2022-03-23 VITALS
BODY MASS INDEX: 23.73 KG/M2 | OXYGEN SATURATION: 98 % | SYSTOLIC BLOOD PRESSURE: 118 MMHG | HEIGHT: 64 IN | DIASTOLIC BLOOD PRESSURE: 75 MMHG | WEIGHT: 139 LBS | HEART RATE: 69 BPM | TEMPERATURE: 98.9 F

## 2022-03-23 DIAGNOSIS — R45.1 AGITATION: ICD-10-CM

## 2022-03-23 DIAGNOSIS — G80.2 SPASTIC HEMIPLEGIC CEREBRAL PALSY: Primary | ICD-10-CM

## 2022-03-23 DIAGNOSIS — E78.2 MIXED HYPERLIPIDEMIA: ICD-10-CM

## 2022-03-23 DIAGNOSIS — F39 MOOD DISORDER: ICD-10-CM

## 2022-03-23 PROCEDURE — 99214 OFFICE O/P EST MOD 30 MIN: CPT | Performed by: FAMILY MEDICINE

## 2022-03-23 RX ORDER — RISPERIDONE 2 MG/1
2 TABLET ORAL NIGHTLY
Qty: 90 TABLET | Refills: 1 | Status: SHIPPED | OUTPATIENT
Start: 2022-03-23 | End: 2022-04-08

## 2022-03-23 RX ORDER — ZONISAMIDE 100 MG/1
CAPSULE ORAL
Qty: 360 CAPSULE | Refills: 3 | Status: SHIPPED | OUTPATIENT
Start: 2022-03-23 | End: 2023-03-06
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
## 2022-04-05 ENCOUNTER — PATIENT MESSAGE (OUTPATIENT)
Dept: FAMILY MEDICINE CLINIC | Facility: CLINIC | Age: 52
End: 2022-04-05

## 2022-04-06 ENCOUNTER — TELEPHONE (OUTPATIENT)
Dept: FAMILY MEDICINE CLINIC | Facility: CLINIC | Age: 52
End: 2022-04-06

## 2022-04-06 DIAGNOSIS — R45.1 AGITATION: ICD-10-CM

## 2022-04-06 DIAGNOSIS — F39 MOOD DISORDER: ICD-10-CM

## 2022-04-06 NOTE — TELEPHONE ENCOUNTER
PATIENTS MOTHER FRANCISCO J IS CALLING IN STATING THAT THE PATIENT STARTED TAKING risperiDONE (risperDAL) 2 MG tablet   ABOUT A WEEK AND A HALF AGO  AND HE IS NOW HAVING REALLY BAD AGGRESSION SPELLS, JERKING TWITCHING MOVEMENTS, SHAKING HIS FEET REALLY BAD, HAVING A HARD TIME SLEEPING.  FRANCISCO J WANTS TO BE CALLED TO DISCUSS THIS WITH DR DE LEÓN AND SEE IF HE NEEDS SOME DIFFERENT MEDICATION OR IF HE NEEDS TO STOP TAKING THIS ALL TOGETHER.        FRANCISCO J CALL BACK  [phone]

## 2022-04-08 RX ORDER — OLANZAPINE 2.5 MG/1
TABLET ORAL
Qty: 42 TABLET | Refills: 0 | Status: SHIPPED | OUTPATIENT
Start: 2022-04-08 | End: 2022-04-11 | Stop reason: SDUPTHER
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
## 2022-04-08 NOTE — TELEPHONE ENCOUNTER
Olanzapine is associated with weight gain like Risperidone.    Kalpesh's weight has been well controlled and we'll continue to monitor it.    Additionally at high doses he may have restlessness similar to risperidone.

## 2022-04-08 NOTE — TELEPHONE ENCOUNTER
From: Sreedhar Raines  To: Concepción Sandra MD  Sent: 4/5/2022 9:41 PM EDT  Subject: Sreedhar Raines Risperdone side effects.    I sent this message to you earlier today but affraid I may have sent it to the wrong place for which I apologize. This is new stuff for me to learn. 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 He is also experiencing more jerking in his right shoulder that is preventing him from getting good night's sleep. Also having body twitching in his legs. No tapping of his foot but when he sits he's shaking his foot constantly. These symptoms intensified this past Saturday and each day they are getting worse. I need your help please.

## 2022-04-08 NOTE — TELEPHONE ENCOUNTER
Spoke with Liudmila.Increasing dose of Risperidone is causing the side effects. She has decreased his dose of Risperidone to 1 mg daily. She would prefer to leave him there at that dose.

## 2022-04-08 NOTE — TELEPHONE ENCOUNTER
Please call patient's mother and let her know that I apologize for the delay in my response.    We can increase Kalpesh's dose of risperidone.  Alternatively we can try another antipsychotic medication like olanzapine/Zyprexa.    I like both drugs and am agreeable to either option.  The side effects are similar with both medication, there is concern of weight gain and associated metabolic dysfunction (increased glucose and cholesterol) with weight gain.    Which option would she like to pursue?

## 2022-04-08 NOTE — TELEPHONE ENCOUNTER
Please call patient's mother and let her know that lip smacking, restlessness, and tic like symptoms can be seen in high doses of antipsychotic medication. Would she be interested in olanzapine/Zyprexa instead of risperidone or increasing dose of Ativan?

## 2022-04-11 ENCOUNTER — TELEPHONE (OUTPATIENT)
Dept: FAMILY MEDICINE CLINIC | Facility: CLINIC | Age: 52
End: 2022-04-11

## 2022-04-11 DIAGNOSIS — R45.1 AGITATION: ICD-10-CM

## 2022-04-11 DIAGNOSIS — F39 MOOD DISORDER: ICD-10-CM

## 2022-04-11 RX ORDER — OLANZAPINE 5 MG/1
5 TABLET ORAL NIGHTLY
Qty: 30 TABLET | Refills: 1 | Status: SHIPPED | OUTPATIENT
Start: 2022-04-11 | End: 2022-06-06

## 2022-04-11 NOTE — TELEPHONE ENCOUNTER
Insurance will not cover Olanzapine qty 42 per 30. Spoke with pharmacy. Insurance will cover 1 a day at 30 per 30. Can this be changed?

## 2022-04-11 NOTE — TELEPHONE ENCOUNTER
Please call patient's mother and let her know that I had prescribed olanzapine as a titration and insurance did not cover it when written in that fashion. I sent a new prescription to patient's pharmacy. This should be covered by insurance.

## 2022-04-18 ENCOUNTER — TELEPHONE (OUTPATIENT)
Dept: NEUROLOGY | Facility: CLINIC | Age: 52
End: 2022-04-18

## 2022-05-04 ENCOUNTER — LAB (OUTPATIENT)
Dept: FAMILY MEDICINE CLINIC | Facility: CLINIC | Age: 52
End: 2022-05-04

## 2022-05-04 DIAGNOSIS — E78.2 MIXED HYPERLIPIDEMIA: ICD-10-CM

## 2022-05-04 DIAGNOSIS — R45.1 AGITATION: ICD-10-CM

## 2022-05-04 DIAGNOSIS — F39 MOOD DISORDER: ICD-10-CM

## 2022-05-04 LAB
ALBUMIN SERPL-MCNC: 4.4 G/DL (ref 3.5–5.2)
ALBUMIN/GLOB SERPL: 1.8 G/DL
ALP SERPL-CCNC: 88 U/L (ref 39–117)
ALT SERPL W P-5'-P-CCNC: 11 U/L (ref 1–41)
ANION GAP SERPL CALCULATED.3IONS-SCNC: 12.9 MMOL/L (ref 5–15)
AST SERPL-CCNC: 13 U/L (ref 1–40)
BASOPHILS # BLD AUTO: 0.01 10*3/MM3 (ref 0–0.2)
BASOPHILS NFR BLD AUTO: 0.2 % (ref 0–1.5)
BILIRUB SERPL-MCNC: <0.2 MG/DL (ref 0–1.2)
BUN SERPL-MCNC: 20 MG/DL (ref 6–20)
BUN/CREAT SERPL: 16.8 (ref 7–25)
CALCIUM SPEC-SCNC: 9.5 MG/DL (ref 8.6–10.5)
CHLORIDE SERPL-SCNC: 110 MMOL/L (ref 98–107)
CHOLEST SERPL-MCNC: 204 MG/DL (ref 0–200)
CO2 SERPL-SCNC: 20.1 MMOL/L (ref 22–29)
CREAT SERPL-MCNC: 1.19 MG/DL (ref 0.76–1.27)
DEPRECATED RDW RBC AUTO: 40.6 FL (ref 37–54)
EGFRCR SERPLBLD CKD-EPI 2021: 74.4 ML/MIN/1.73
EOSINOPHIL # BLD AUTO: 0.02 10*3/MM3 (ref 0–0.4)
EOSINOPHIL NFR BLD AUTO: 0.5 % (ref 0.3–6.2)
ERYTHROCYTE [DISTWIDTH] IN BLOOD BY AUTOMATED COUNT: 11.8 % (ref 12.3–15.4)
GLOBULIN UR ELPH-MCNC: 2.4 GM/DL
GLUCOSE SERPL-MCNC: 89 MG/DL (ref 65–99)
HCT VFR BLD AUTO: 36.1 % (ref 37.5–51)
HDLC SERPL-MCNC: 55 MG/DL (ref 40–60)
HGB BLD-MCNC: 12.2 G/DL (ref 13–17.7)
IMM GRANULOCYTES # BLD AUTO: 0.01 10*3/MM3 (ref 0–0.05)
IMM GRANULOCYTES NFR BLD AUTO: 0.2 % (ref 0–0.5)
LDLC SERPL CALC-MCNC: 129 MG/DL (ref 0–100)
LDLC/HDLC SERPL: 2.31 {RATIO}
LYMPHOCYTES # BLD AUTO: 0.78 10*3/MM3 (ref 0.7–3.1)
LYMPHOCYTES NFR BLD AUTO: 17.7 % (ref 19.6–45.3)
MCH RBC QN AUTO: 31.9 PG (ref 26.6–33)
MCHC RBC AUTO-ENTMCNC: 33.8 G/DL (ref 31.5–35.7)
MCV RBC AUTO: 94.3 FL (ref 79–97)
MONOCYTES # BLD AUTO: 0.85 10*3/MM3 (ref 0.1–0.9)
MONOCYTES NFR BLD AUTO: 19.3 % (ref 5–12)
NEUTROPHILS NFR BLD AUTO: 2.73 10*3/MM3 (ref 1.7–7)
NEUTROPHILS NFR BLD AUTO: 62.1 % (ref 42.7–76)
NRBC BLD AUTO-RTO: 0 /100 WBC (ref 0–0.2)
PLATELET # BLD AUTO: 215 10*3/MM3 (ref 140–450)
PMV BLD AUTO: 9.8 FL (ref 6–12)
POTASSIUM SERPL-SCNC: 3.7 MMOL/L (ref 3.5–5.2)
PROT SERPL-MCNC: 6.8 G/DL (ref 6–8.5)
RBC # BLD AUTO: 3.83 10*6/MM3 (ref 4.14–5.8)
SODIUM SERPL-SCNC: 143 MMOL/L (ref 136–145)
TRIGL SERPL-MCNC: 110 MG/DL (ref 0–150)
VLDLC SERPL-MCNC: 20 MG/DL (ref 5–40)
WBC NRBC COR # BLD: 4.4 10*3/MM3 (ref 3.4–10.8)

## 2022-05-04 PROCEDURE — 36415 COLL VENOUS BLD VENIPUNCTURE: CPT

## 2022-05-04 PROCEDURE — 85025 COMPLETE CBC W/AUTO DIFF WBC: CPT | Performed by: FAMILY MEDICINE

## 2022-05-04 PROCEDURE — 80061 LIPID PANEL: CPT | Performed by: FAMILY MEDICINE

## 2022-05-04 PROCEDURE — 80053 COMPREHEN METABOLIC PANEL: CPT | Performed by: FAMILY MEDICINE

## 2022-05-06 DIAGNOSIS — R45.1 AGITATION: ICD-10-CM

## 2022-05-06 DIAGNOSIS — F39 MOOD DISORDER: ICD-10-CM

## 2022-05-06 DIAGNOSIS — G80.2 SPASTIC HEMIPLEGIC CEREBRAL PALSY: ICD-10-CM

## 2022-05-06 RX ORDER — LORAZEPAM 1 MG/1
1 TABLET ORAL EVERY 8 HOURS PRN
Qty: 60 TABLET | Refills: 0 | Status: SHIPPED | OUTPATIENT
Start: 2022-05-06 | End: 2022-07-13

## 2022-06-06 DIAGNOSIS — R45.1 AGITATION: ICD-10-CM

## 2022-06-06 DIAGNOSIS — F39 MOOD DISORDER: ICD-10-CM

## 2022-06-06 RX ORDER — OLANZAPINE 5 MG/1
TABLET ORAL
Qty: 30 TABLET | Refills: 1 | Status: SHIPPED | OUTPATIENT
Start: 2022-06-06 | End: 2022-06-06 | Stop reason: SDUPTHER

## 2022-06-06 RX ORDER — OLANZAPINE 5 MG/1
TABLET ORAL
Qty: 30 TABLET | Refills: 1 | Status: SHIPPED | OUTPATIENT
Start: 2022-06-06 | End: 2022-09-07

## 2022-06-06 NOTE — TELEPHONE ENCOUNTER
Caller: FRANCISCO J KILGORE    Relationship: Mother    Best call back number: [phone]    Requested Prescriptions:   Requested Prescriptions     Pending Prescriptions Disp Refills   • OLANZapine (zyPREXA) 5 MG tablet 30 tablet 1        Pharmacy where request should be sent: [facility] #[id number] [facility] [address] AT SEC OF [place] [address] & [facility] LINE  - [phone]  - [phone] FX     Additional details provided by patient: PATIENT'S MOTHER STATES THAT THEY WERE TOLD BY THEIR PHARMACY THAT A REFILL FOR THE MEDICATION HAD BEEN DENIED. PLEASE CALL IF THERE ARE ANY QUESTIONS OR CONCERNS WITH MEDICATION. PATIENT HAS 6 TABLET REMAINING    Does the patient have less than a 3 day supply:  [] Yes  [x] No    Viviana Keller Rep   06/06/22 10:34 EDT

## 2022-06-08 ENCOUNTER — TRANSCRIBE ORDERS (OUTPATIENT)
Dept: ADMINISTRATIVE | Facility: HOSPITAL | Age: 52
End: 2022-06-08

## 2022-06-08 ENCOUNTER — LAB (OUTPATIENT)
Dept: LAB | Facility: HOSPITAL | Age: 52
End: 2022-06-08

## 2022-06-08 ENCOUNTER — HOSPITAL ENCOUNTER (OUTPATIENT)
Dept: CARDIOLOGY | Facility: HOSPITAL | Age: 52
Discharge: HOME | End: 2022-06-08

## 2022-06-08 DIAGNOSIS — Z01.818 PRE-OP TESTING: ICD-10-CM

## 2022-06-08 DIAGNOSIS — Z01.818 PRE-OP TESTING: Primary | ICD-10-CM

## 2022-06-08 LAB
ANION GAP SERPL CALCULATED.3IONS-SCNC: 11 MMOL/L (ref 5–15)
BASOPHILS # BLD AUTO: 0.01 10*3/MM3 (ref 0–0.2)
BASOPHILS NFR BLD AUTO: 0.2 % (ref 0–1.5)
BUN SERPL-MCNC: 14 MG/DL (ref 6–20)
BUN/CREAT SERPL: 13.9 (ref 7–25)
CALCIUM SPEC-SCNC: 9.1 MG/DL (ref 8.6–10.5)
CHLORIDE SERPL-SCNC: 105 MMOL/L (ref 98–107)
CO2 SERPL-SCNC: 22 MMOL/L (ref 22–29)
CREAT SERPL-MCNC: 1.01 MG/DL (ref 0.76–1.27)
DEPRECATED RDW RBC AUTO: 39 FL (ref 37–54)
EGFRCR SERPLBLD CKD-EPI 2021: 90.6 ML/MIN/1.73
EOSINOPHIL # BLD AUTO: 0.07 10*3/MM3 (ref 0–0.4)
EOSINOPHIL NFR BLD AUTO: 1.7 % (ref 0.3–6.2)
ERYTHROCYTE [DISTWIDTH] IN BLOOD BY AUTOMATED COUNT: 11.8 % (ref 12.3–15.4)
GLUCOSE SERPL-MCNC: 100 MG/DL (ref 65–99)
HCT VFR BLD AUTO: 37 % (ref 37.5–51)
HGB BLD-MCNC: 12.8 G/DL (ref 13–17.7)
IMM GRANULOCYTES # BLD AUTO: 0.02 10*3/MM3 (ref 0–0.05)
IMM GRANULOCYTES NFR BLD AUTO: 0.5 % (ref 0–0.5)
LYMPHOCYTES # BLD AUTO: 1.3 10*3/MM3 (ref 0.7–3.1)
LYMPHOCYTES NFR BLD AUTO: 30.7 % (ref 19.6–45.3)
MCH RBC QN AUTO: 31.7 PG (ref 26.6–33)
MCHC RBC AUTO-ENTMCNC: 34.6 G/DL (ref 31.5–35.7)
MCV RBC AUTO: 91.6 FL (ref 79–97)
MONOCYTES # BLD AUTO: 0.51 10*3/MM3 (ref 0.1–0.9)
MONOCYTES NFR BLD AUTO: 12 % (ref 5–12)
NEUTROPHILS NFR BLD AUTO: 2.33 10*3/MM3 (ref 1.7–7)
NEUTROPHILS NFR BLD AUTO: 54.9 % (ref 42.7–76)
NRBC BLD AUTO-RTO: 0 /100 WBC (ref 0–0.2)
PLATELET # BLD AUTO: 210 10*3/MM3 (ref 140–450)
PMV BLD AUTO: 9.6 FL (ref 6–12)
POTASSIUM SERPL-SCNC: 3.7 MMOL/L (ref 3.5–5.2)
RBC # BLD AUTO: 4.04 10*6/MM3 (ref 4.14–5.8)
SODIUM SERPL-SCNC: 138 MMOL/L (ref 136–145)
WBC NRBC COR # BLD: 4.24 10*3/MM3 (ref 3.4–10.8)

## 2022-06-08 PROCEDURE — 36415 COLL VENOUS BLD VENIPUNCTURE: CPT

## 2022-06-08 PROCEDURE — 80048 BASIC METABOLIC PNL TOTAL CA: CPT

## 2022-06-08 PROCEDURE — 93005 ELECTROCARDIOGRAM TRACING: CPT | Performed by: UROLOGY

## 2022-06-08 PROCEDURE — 85025 COMPLETE CBC W/AUTO DIFF WBC: CPT

## 2022-06-08 PROCEDURE — 93010 ELECTROCARDIOGRAM REPORT: CPT | Performed by: INTERNAL MEDICINE

## 2022-06-09 DIAGNOSIS — R45.1 AGITATION: ICD-10-CM

## 2022-06-09 DIAGNOSIS — F39 MOOD DISORDER: ICD-10-CM

## 2022-06-13 LAB — QT INTERVAL: 425 MS

## 2022-06-13 RX ORDER — RISPERIDONE 1 MG/1
1 TABLET ORAL NIGHTLY
Qty: 90 TABLET | Refills: 1 | OUTPATIENT
Start: 2022-06-13

## 2022-06-13 NOTE — TELEPHONE ENCOUNTER
Please call patient's pharmacy and let them know that risperidone prescription was switched to olanzapine.

## 2022-06-16 DIAGNOSIS — J30.89 ENVIRONMENTAL AND SEASONAL ALLERGIES: ICD-10-CM

## 2022-06-16 RX ORDER — MONTELUKAST SODIUM 10 MG/1
10 TABLET ORAL NIGHTLY
Qty: 90 TABLET | Refills: 1 | Status: SHIPPED | OUTPATIENT
Start: 2022-06-16 | End: 2022-12-14 | Stop reason: SDUPTHER

## 2022-06-16 RX ORDER — PHENYTOIN SODIUM 100 MG/1
200 CAPSULE, EXTENDED RELEASE ORAL NIGHTLY
Qty: 60 CAPSULE | Refills: 5 | Status: SHIPPED | OUTPATIENT
Start: 2022-06-16 | End: 2022-12-19

## 2022-06-17 ENCOUNTER — TRANSCRIBE ORDERS (OUTPATIENT)
Dept: ADMINISTRATIVE | Facility: HOSPITAL | Age: 52
End: 2022-06-17

## 2022-06-17 ENCOUNTER — HOSPITAL ENCOUNTER (OUTPATIENT)
Dept: GENERAL RADIOLOGY | Facility: HOSPITAL | Age: 52
Discharge: HOME | End: 2022-06-17
Admitting: UROLOGY

## 2022-06-17 DIAGNOSIS — N20.0 KIDNEY STONES: ICD-10-CM

## 2022-06-17 DIAGNOSIS — N20.0 KIDNEY STONES: Primary | ICD-10-CM

## 2022-06-17 PROCEDURE — 74018 RADEX ABDOMEN 1 VIEW: CPT

## 2022-06-21 PROCEDURE — 82365 CALCULUS SPECTROSCOPY: CPT | Performed by: UROLOGY

## 2022-06-22 ENCOUNTER — LAB REQUISITION (OUTPATIENT)
Dept: LAB | Facility: HOSPITAL | Age: 52
End: 2022-06-22

## 2022-06-22 DIAGNOSIS — N20.0 CALCULUS OF KIDNEY: ICD-10-CM

## 2022-06-27 LAB
COLOR STONE: NORMAL
COM MFR STONE: 40 %
COMPN STONE: NORMAL
HYDROXYAPATITE 24H ENGDIFF UR: 60 %
LABORATORY COMMENT REPORT: NORMAL
Lab: NORMAL
Lab: NORMAL
PHOTO: NORMAL
SIZE STONE: NORMAL MM
SPEC SOURCE SUBJ: NORMAL
STONE ANALYSIS-IMP: NORMAL
WT STONE: 40 MG

## 2022-07-13 DIAGNOSIS — G80.2 SPASTIC HEMIPLEGIC CEREBRAL PALSY: ICD-10-CM

## 2022-07-13 DIAGNOSIS — R45.1 AGITATION: ICD-10-CM

## 2022-07-13 DIAGNOSIS — F39 MOOD DISORDER: ICD-10-CM

## 2022-07-13 RX ORDER — LORAZEPAM 1 MG/1
1 TABLET ORAL EVERY 8 HOURS PRN
Qty: 60 TABLET | Refills: 0 | Status: SHIPPED | OUTPATIENT
Start: 2022-07-13 | End: 2022-12-15 | Stop reason: SDUPTHER

## 2022-08-15 DIAGNOSIS — J30.89 ENVIRONMENTAL AND SEASONAL ALLERGIES: ICD-10-CM

## 2022-08-15 RX ORDER — LEVOCETIRIZINE DIHYDROCHLORIDE 5 MG/1
5 TABLET, FILM COATED ORAL EVERY EVENING
Qty: 90 TABLET | Refills: 1 | Status: SHIPPED | OUTPATIENT
Start: 2022-08-15 | End: 2022-12-14 | Stop reason: SDUPTHER

## 2022-09-07 DIAGNOSIS — R45.1 AGITATION: ICD-10-CM

## 2022-09-07 DIAGNOSIS — F39 MOOD DISORDER: ICD-10-CM

## 2022-09-07 DIAGNOSIS — N20.0 NEPHROLITHIASIS: ICD-10-CM

## 2022-09-07 RX ORDER — TAMSULOSIN HYDROCHLORIDE 0.4 MG/1
1 CAPSULE ORAL DAILY
Qty: 90 CAPSULE | Refills: 1 | Status: SHIPPED | OUTPATIENT
Start: 2022-09-07 | End: 2022-12-14 | Stop reason: SDUPTHER

## 2022-09-07 RX ORDER — OLANZAPINE 5 MG/1
5 TABLET ORAL NIGHTLY
Qty: 90 TABLET | Refills: 0 | Status: SHIPPED | OUTPATIENT
Start: 2022-09-07 | End: 2022-12-14 | Stop reason: SDUPTHER

## 2022-09-30 ENCOUNTER — OFFICE VISIT (OUTPATIENT)
Dept: FAMILY MEDICINE CLINIC | Facility: CLINIC | Age: 52
End: 2022-09-30

## 2022-09-30 VITALS
SYSTOLIC BLOOD PRESSURE: 128 MMHG | HEART RATE: 72 BPM | DIASTOLIC BLOOD PRESSURE: 80 MMHG | WEIGHT: 146 LBS | OXYGEN SATURATION: 98 % | BODY MASS INDEX: 24.92 KG/M2 | HEIGHT: 64 IN | RESPIRATION RATE: 16 BRPM

## 2022-09-30 DIAGNOSIS — M24.541 CONTRACTURE OF RIGHT HAND: ICD-10-CM

## 2022-09-30 DIAGNOSIS — F39 MOOD DISORDER: Primary | ICD-10-CM

## 2022-09-30 DIAGNOSIS — E78.2 MIXED HYPERLIPIDEMIA: ICD-10-CM

## 2022-09-30 DIAGNOSIS — R82.998 CALCIUM OXALATE CRYSTALS IN URINE: ICD-10-CM

## 2022-09-30 PROCEDURE — 99213 OFFICE O/P EST LOW 20 MIN: CPT | Performed by: STUDENT IN AN ORGANIZED HEALTH CARE EDUCATION/TRAINING PROGRAM

## 2022-09-30 NOTE — PROGRESS NOTES
"Chief Complaint  Chief Complaint   Patient presents with   • Establish Care  Mood  Kidney stones     Subjective        Sreedhar Raines is a 52 y.o. male who presents to [facility].  History of Present Illness  Kalpesh is here with his mother who is his POA to establish care and discuss his mood, frequent kidney stones.    Has had to have multiple kidney stones, now seeing urology, on flomax.  On nightly zyprexa w/ prn ativan for his mood which can tend towards aggression at times.    He sees neurology for seizure disorder.      His mom is his surrogate decision maker / POA.  Should anything happen to her his mom has already established that his sister will become caregiver.  Mom has gone through a  to get all of this taken care of.      Kalpesh likes misael.  Hoping to do special olympics when they get him an [place] ID.      Objective   /80 (BP Location: Left arm, Patient Position: Sitting)   Pulse 72   Resp 16   Ht 162.6 cm (64.02\")   Wt 66.2 kg (146 lb)   SpO2 98%   BMI 25.05 kg/m²     Estimated body mass index is 25.05 kg/m² as calculated from the following:    Height as of this encounter: 162.6 cm (64.02\").    Weight as of this encounter: 66.2 kg (146 lb).     Physical Exam   GEN: In no acute distress, non toxic appearing  HEENT: Pupils equal and reactive to light, sclera clear. Mucous membranes moist. Oropharynx without erythema or exudate. No cervical or submandibular lymphadenopathy.  CV: Regular rate and rhythm, no murmurs, 2+ peripheral pulses, No extremity edema.   RESP: Lungs clear to auscultation anteriorly and posteriorly in all lung fields bilaterally.  ABD: Soft, nontender, nondistended  SKIN: No rashes  MSK: R hand in wrist brace.  Normal gait.      PHQ-2 Depression Screening  Little interest or pleasure in doing things? 0-->not at all   Feeling down, depressed, or hopeless? 0-->not at all   PHQ-2 Total Score 0      Result Review :    The following data " was reviewed by: Todd Jiménez DO on 09/30/2022:  CMP    CMP 10/6/21 5/4/22 6/8/22   Glucose 87 89 100 (A)   BUN 15 20 14   Creatinine 1.31 (A) 1.19 1.01   eGFR Non African Am 58 (A)     Sodium 140 143 138   Potassium 3.7 3.7 3.7   Chloride 106 110 (A) 105   Calcium 8.7 9.5 9.1   Albumin  4.40    Total Bilirubin  <0.2    Alkaline Phosphatase  88    AST (SGOT)  13    ALT (SGPT)  11    (A) Abnormal value            CBC w/diff    CBC w/Diff 10/6/21 5/4/22 6/8/22   WBC 3.81 4.40 4.24   RBC 3.85 (A) 3.83 (A) 4.04 (A)   Hemoglobin 12.3 (A) 12.2 (A) 12.8 (A)   Hematocrit 36.2 (A) 36.1 (A) 37.0 (A)   MCV 94.0 94.3 91.6   MCH 31.9 31.9 31.7   MCHC 34.0 33.8 34.6   RDW 11.9 (A) 11.8 (A) 11.8 (A)   Platelets 239 215 210   Neutrophil Rel %  62.1 54.9   Immature Granulocyte Rel %  0.2 0.5   Lymphocyte Rel %  17.7 (A) 30.7   Monocyte Rel %  19.3 (A) 12.0   Eosinophil Rel %  0.5 1.7   Basophil Rel %  0.2 0.2   (A) Abnormal value            Lipid Panel    Lipid Panel 5/4/22   Total Cholesterol 204 (A)   Triglycerides 110   HDL Cholesterol 55   VLDL Cholesterol 20   LDL Cholesterol  129 (A)   LDL/HDL Ratio 2.31   (A) Abnormal value                  Assessment and Plan     Diagnoses and all orders for this visit:    1. Mood disorder (HCC) (Primary)  Continue zyprexa w/ prn ativan.  Overall doing well at this time, no changes today.      2. Mixed hyperlipidemia  Plan for repeat labs in 6 months at medicare wellness visit.      3. Contracture of right hand    4. Calcium oxalate crystals in urine  Continue flomax and following with urology.      Otherwise, no major concerns today.  BP looks good.  No med refills needed at this time.  Continue current management.         Follow Up     Return in about 6 months (around 3/30/2023) for Medicare Wellness.

## 2022-12-14 DIAGNOSIS — F39 MOOD DISORDER: ICD-10-CM

## 2022-12-14 DIAGNOSIS — J30.89 ENVIRONMENTAL AND SEASONAL ALLERGIES: ICD-10-CM

## 2022-12-14 DIAGNOSIS — R45.1 AGITATION: ICD-10-CM

## 2022-12-14 DIAGNOSIS — N20.0 NEPHROLITHIASIS: ICD-10-CM

## 2022-12-14 RX ORDER — OLANZAPINE 5 MG/1
5 TABLET ORAL NIGHTLY
Qty: 90 TABLET | Refills: 1 | Status: SHIPPED | OUTPATIENT
Start: 2022-12-14

## 2022-12-14 RX ORDER — TAMSULOSIN HYDROCHLORIDE 0.4 MG/1
1 CAPSULE ORAL DAILY
Qty: 90 CAPSULE | Refills: 1 | OUTPATIENT
Start: 2022-12-14

## 2022-12-14 RX ORDER — MONTELUKAST SODIUM 10 MG/1
10 TABLET ORAL NIGHTLY
Qty: 90 TABLET | Refills: 1 | OUTPATIENT
Start: 2022-12-14

## 2022-12-14 RX ORDER — LEVOCETIRIZINE DIHYDROCHLORIDE 5 MG/1
5 TABLET, FILM COATED ORAL EVERY EVENING
Qty: 90 TABLET | Refills: 1 | OUTPATIENT
Start: 2022-12-14

## 2022-12-14 RX ORDER — LEVOCETIRIZINE DIHYDROCHLORIDE 5 MG/1
5 TABLET, FILM COATED ORAL EVERY EVENING
Qty: 90 TABLET | Refills: 1 | Status: SHIPPED | OUTPATIENT
Start: 2022-12-14

## 2022-12-14 RX ORDER — OLANZAPINE 5 MG/1
5 TABLET ORAL NIGHTLY
Qty: 90 TABLET | Refills: 0 | OUTPATIENT
Start: 2022-12-14

## 2022-12-14 RX ORDER — MONTELUKAST SODIUM 10 MG/1
10 TABLET ORAL NIGHTLY
Qty: 90 TABLET | Refills: 1 | Status: SHIPPED | OUTPATIENT
Start: 2022-12-14

## 2022-12-14 RX ORDER — TAMSULOSIN HYDROCHLORIDE 0.4 MG/1
1 CAPSULE ORAL DAILY
Qty: 90 CAPSULE | Refills: 1 | Status: SHIPPED | OUTPATIENT
Start: 2022-12-14

## 2022-12-14 NOTE — TELEPHONE ENCOUNTER
Caller: FRANCISCO J KILGORE    Relationship: Mother    Best call back number: [phone]  Requested Prescriptions:   Requested Prescriptions     Pending Prescriptions Disp Refills   • tamsulosin (FLOMAX) 0.4 MG capsule 24 hr capsule 90 capsule 1     Sig: Take 1 capsule by mouth Daily.   • OLANZapine (zyPREXA) 5 MG tablet 90 tablet 0     Sig: Take 1 tablet by mouth Every Night.   • montelukast (SINGULAIR) 10 MG tablet 90 tablet 1     Sig: Take 1 tablet by mouth Every Night.   • levocetirizine (XYZAL) 5 MG tablet 90 tablet 1     Sig: Take 1 tablet by mouth Every Evening.        Pharmacy where request should be sent: [id number] [place] [id number] [facility] [address] [place] [address] & [facility] - [phone]  - [phone] FX     Additional details provided by patient: OUT  NEED NEW PRESCRIPTIONS     Does the patient have less than a 3 day supply:  [x] Yes  [] No    Would you like a call back once the refill request has been completed: [x] Yes [] No    If the office needs to give you a call back, can they leave a voicemail: [x] Yes [] No    Nickolas Martínez   12/14/22 11:09 EST

## 2022-12-15 DIAGNOSIS — G80.2 SPASTIC HEMIPLEGIC CEREBRAL PALSY: ICD-10-CM

## 2022-12-15 DIAGNOSIS — F39 MOOD DISORDER: ICD-10-CM

## 2022-12-15 DIAGNOSIS — R45.1 AGITATION: ICD-10-CM

## 2022-12-15 NOTE — TELEPHONE ENCOUNTER
Caller: FRANCISCO J KILGORE    Relationship: Mother    Best call back number: [phone]    Requested Prescriptions:   Requested Prescriptions     Pending Prescriptions Disp Refills   • LORazepam (ATIVAN) 1 MG tablet 60 tablet 0     Sig: Take 1 tablet by mouth Every 8 (Eight) Hours As Needed for Anxiety.        Pharmacy where request should be sent: [facility] #[id number] [address] AT SEC OF [place] [address] & [facility] LINE  - [phone] [facility] [phone] FX     Additional details provided by patient: PATIENTS MOTHER STATES THE PATIENT ONLY HAS 2 TABLETS LEFT ON HIS CURRENT PRESCRIPTION.     Does the patient have less than a 3 day supply:  [x] Yes  [] No    Bre Partida, PCT   12/15/22 10:42 EST

## 2022-12-16 RX ORDER — LORAZEPAM 1 MG/1
1 TABLET ORAL EVERY 8 HOURS PRN
Qty: 60 TABLET | Refills: 0 | Status: SHIPPED | OUTPATIENT
Start: 2022-12-16 | End: 2023-04-03 | Stop reason: SDUPTHER

## 2022-12-19 RX ORDER — PHENYTOIN SODIUM 100 MG/1
200 CAPSULE, EXTENDED RELEASE ORAL NIGHTLY
Qty: 60 CAPSULE | Refills: 5 | Status: SHIPPED | OUTPATIENT
Start: 2022-12-19

## 2023-01-03 ENCOUNTER — TELEPHONE (OUTPATIENT)
Dept: NEUROLOGY | Facility: CLINIC | Age: 53
End: 2023-01-03
Payer: MEDICARE

## 2023-01-03 NOTE — TELEPHONE ENCOUNTER
[facility] No authorization required reference # [id number] spoke with Annalee YAN 01/03/23.

## 2023-01-03 NOTE — TELEPHONE ENCOUNTER
ATTEMPTED TO DO PA FOR EPIDIOLEX AND PER COVER MY MEDS SITE- PA IS ALREADY APPROVED     [id number] ( KEY)  APPROVED FROM 1.1.23 TO 12.31.23    COPY FAXED TO PHARM

## 2023-01-26 ENCOUNTER — TELEPHONE (OUTPATIENT)
Dept: NEUROLOGY | Facility: CLINIC | Age: 53
End: 2023-01-26
Payer: MEDICARE

## 2023-01-26 NOTE — TELEPHONE ENCOUNTER
----- Message from Payton Harrington MD sent at 1/26/2023  4:22 PM EST -----  30 min should be ok.  Thanks for checking.  ----- Message -----  From: Rosie Al MA  Sent: 1/26/2023   3:33 PM EST  To: Payton Harrington MD    YOU'VE NOT SEEN HIM IN A WHILE AND HE JUST COMPLETED HIS EMU STAY AT [place]- ARE YOU DOING TO NEED MORE THAN 30 MIN FOR FU ?

## 2023-02-08 ENCOUNTER — LAB (OUTPATIENT)
Dept: LAB | Facility: HOSPITAL | Age: 53
End: 2023-02-08
Payer: MEDICARE

## 2023-02-08 ENCOUNTER — OFFICE VISIT (OUTPATIENT)
Dept: NEUROLOGY | Facility: CLINIC | Age: 53
End: 2023-02-08
Payer: MEDICARE

## 2023-02-08 VITALS
DIASTOLIC BLOOD PRESSURE: 72 MMHG | BODY MASS INDEX: 25.68 KG/M2 | SYSTOLIC BLOOD PRESSURE: 120 MMHG | WEIGHT: 150.4 LBS | OXYGEN SATURATION: 100 % | HEART RATE: 87 BPM | HEIGHT: 64 IN

## 2023-02-08 DIAGNOSIS — Z79.899 HIGH RISK MEDICATION USE: ICD-10-CM

## 2023-02-08 DIAGNOSIS — Z46.2 ENCOUNTER FOR INTERROGATION OF NEUROSTIMULATOR: ICD-10-CM

## 2023-02-08 DIAGNOSIS — G40.919 INTRACTABLE EPILEPSY WITHOUT STATUS EPILEPTICUS, UNSPECIFIED EPILEPSY TYPE: ICD-10-CM

## 2023-02-08 DIAGNOSIS — G40.814 INTRACTABLE LENNOX-GASTAUT SYNDROME WITHOUT STATUS EPILEPTICUS: Primary | ICD-10-CM

## 2023-02-08 LAB — FOLATE SERPL-MCNC: 5.39 NG/ML (ref 4.78–24.2)

## 2023-02-08 PROCEDURE — 80185 ASSAY OF PHENYTOIN TOTAL: CPT | Performed by: PSYCHIATRY & NEUROLOGY

## 2023-02-08 PROCEDURE — 82746 ASSAY OF FOLIC ACID SERUM: CPT | Performed by: PSYCHIATRY & NEUROLOGY

## 2023-02-08 PROCEDURE — 99214 OFFICE O/P EST MOD 30 MIN: CPT | Performed by: PSYCHIATRY & NEUROLOGY

## 2023-02-08 PROCEDURE — 36415 COLL VENOUS BLD VENIPUNCTURE: CPT | Performed by: PSYCHIATRY & NEUROLOGY

## 2023-02-08 PROCEDURE — 95970 ALYS NPGT W/O PRGRMG: CPT | Performed by: PSYCHIATRY & NEUROLOGY

## 2023-02-08 PROCEDURE — 80186 ASSAY OF PHENYTOIN FREE: CPT | Performed by: PSYCHIATRY & NEUROLOGY

## 2023-02-08 RX ORDER — MIDAZOLAM 5 MG/.1ML
SPRAY NASAL
COMMUNITY
Start: 2023-01-12

## 2023-02-08 NOTE — PROGRESS NOTES
Subjective:     Patient ID: Sreedhar Raines is a 52 y.o. male.    History of Present Illness  The patient is a 52-year-old male with a history of allergies, osteoporosis, kidney stones, developmental delay, intractable epilepsy status post VNS, and cerebral palsy who presents to the neurology clinic today as established patient for follow-up for seizures.  The patient was last seen on December 9, 2021.  He was new patient to me back in October 2019.  The patient has failed Depakote, carbamazepine, levetiracetam, topiramate, Briviact, Onfi, and phenobarbital.  Mom recently stopped Epidiolex.  He is on name brand only Dilantin 200 mg at night, lamotrigine 250 mg twice a day and Zonegran 200 mg twice a day.  The patient's mom reports that in 2022 he had 2 seizures in September, 2 in October, 1 in November, 5 in December, 16 in January, and 2 in February.  He typically has generalized tonic-clonic seizures.  His last 1 was 2 days ago.  Overall mom thinks that 2022 was worse in 2021.  His seizures are longer.  He does have Nayzilam but they have not used it yet.  He generally takes his Dilantin at 6 PM.  On January 10, 2023 his CBC and CMP were normal.  We need to get a Dilantin and folate level today.  The patient has not tried Fycompa or Xcopri.  They did not think he had Lennox-Gastaut based on his EMU and that rules out Banzel.  He is already on 2 sodium channel modulators and therefore I would like to avoid Trileptal, Vimpat, and Aptiom.  Mom reports that she has noticed a rash on his back this morning.  There is been no fevers, lymphadenopathy or mouth ulcers.  There have been no changes in his seizure medications recently.  He had a VNS for 6 years.  Overall mom thinks it is helpful.  When she swiped the magnet about a third of the time it makes his seizure shorter.  He was in the epilepsy monitoring unit at [facility] back in January.  The report is a little confusing.  They state that his seizures are likely coming  from the left hemisphere but then later also reports multifocal seizures.    The following portions of the patient's history were reviewed and updated as appropriate: allergies, current medications, past family history, past medical history, past social history, past surgical history and problem list.    Review of Systems     Objective:    Neurological Exam    Physical Exam     I interrogated the patient's VNS today.  They tolerated the procedure well without any adverse side effects.  Below are the current settings.        Assessment/Plan:    The patient is a 52-year-old male with history of allergies, osteoporosis, kidney stones, developmental delay, intractable epilepsy status post VNS, and cerebral palsy who presents today for follow-up.    1.  Intractable epilepsy status post VNS-unfortunately it seems like his seizures have worsened.  We discussed the short-term as well as a long-term plan.  In the short-term it may be helpful to try something like Fycompa Xcopri.  I am hesitant to try a new seizure medication why currently has a rash.  We will have the patient and his mom come back for another visit to fully discuss these medications after the rash is gone.  For the long-term mom is interested in possible surgical intervention.  I would like to get a brain MRI.  I would like to have that done over at [facility] so that I can present him at the surgery conference there.  I would also like to get blood work today for drug monitoring.  I will see the patient back after his rash has resolved.    A total of 40 minutes of time was spent on encounter today.  This includes reviewing the patient's records, face-to-face time, documentation.       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

## 2023-02-08 NOTE — PATIENT INSTRUCTIONS
**Consider Fycompa or Xcopri for seizures**    [facility]  Payton Harrington MD  Neurology clinic  [phone]    With anti-seizure medications, you may initially notice side effects of fatigue, drowsiness, unsteadiness, and dizziness.  Other possible side effects include nausea, abdominal pain, headache, blurry or double vision, slurred speech and mood changes.  Generally, patients will noticed these symptoms when the medication is first started or with higher doses and will go away with time.    It is import to consistently take your medication every day.  Missing just one dose may put you at risk for a breakthrough seizure.  Consider using reminders on your phone or a pill box.    If you develop a rash, please call the neurology clinic immediately or notify another healthcare professional, as this may be potentially life-threatening.  If you are unable to reach a healthcare professional, go to the emergency room immediately for further evaluation.    If you develop thoughts of wanting to hurt yourself or others, please call the neurology clinic immediately to notify another healthcare professional.  If you are unable to reach a healthcare professional, go to the emergency room immediately for further evaluation.    It is the [place] state law that you cannot drive within 90 days of a seizure.    You should avoid certain activities that if you were to have a seizure, you could harm yourself or others. In general, it is recommended that you avoid operating heavy machinery or power tools, swimming or taking baths by yourself (showers are ok), don't stand over open flames, don't get on high ladders or the roof.  I also recommend to avoid sleeping on your stomach.    For further information on epilepsy and resources available to patients and their families, please visit the Epilepsy Foundation of hospitals at www.efky.org or call [phone].    **Check out the Epilepsy Foundation   [facility]'s monthly Art Group Gathering.  They are [facility] at [email or web address] [place], [address] [place] [address].  Call Molly Hebert at [phone] or email her at [email or web address] for the dates of future gatherings.**      **If you have having memory problems, consider HOBSCOTCH (Home-Based Self-management and Cognitive Training Changes lives).  It is an 8 week self-management program for adults with epilepsy and memory problems.  The program is free at the [facility].  Contact Love Rojas at [phone] or [email or web address].**              **Consider calling the Epilepsy Foundation's 24/7 helpline if you have questions:  [phone]    **If you are interested in the Ketogenic Diet, check out [email or web address].

## 2023-02-10 ENCOUNTER — PATIENT MESSAGE (OUTPATIENT)
Dept: NEUROLOGY | Facility: CLINIC | Age: 53
End: 2023-02-10
Payer: MEDICARE

## 2023-02-10 ENCOUNTER — TELEPHONE (OUTPATIENT)
Dept: NEUROLOGY | Facility: CLINIC | Age: 53
End: 2023-02-10
Payer: MEDICARE

## 2023-02-10 LAB
PHENYTOIN FREE SERPL-MCNC: 0.5 UG/ML (ref 1–2)
PHENYTOIN SERPL-MCNC: 8.4 UG/ML (ref 10–20)

## 2023-02-10 NOTE — TELEPHONE ENCOUNTER
Patient is scheduled 2/24/23 arrive at 12:00, scan will begin at 1:00pm.This MRI will be done at [address], [place], [place] [address]. Patient will register in the basement of [facility]. Patient must also bring remote to [facility]. This is to be done on the 3T machine, I spoke with radiology and was assured that the scan will be done correctly.       I will call patients mother to make her aware of appointment information.

## 2023-02-11 DIAGNOSIS — G40.919 INTRACTABLE EPILEPSY WITHOUT STATUS EPILEPTICUS, UNSPECIFIED EPILEPSY TYPE: Primary | ICD-10-CM

## 2023-02-13 RX ORDER — LAMOTRIGINE 100 MG/1
TABLET ORAL
Qty: 450 TABLET | Refills: 3 | Status: SHIPPED | OUTPATIENT
Start: 2023-02-13

## 2023-02-13 NOTE — TELEPHONE ENCOUNTER
From: Sreedhar Raines  To: Payton Harrington  Sent: 2/10/2023 9:44 PM EST  Subject: Question regarding PHENYTOIN TOTAL AND FREE    With dilantin being so low and you wanting to get him off of it, would this be a good time to introduce a new drug?

## 2023-02-14 RX ORDER — PHENYTOIN 50 MG/1
50 TABLET, CHEWABLE ORAL 3 TIMES DAILY
Qty: 90 TABLET | Refills: 3 | Status: SHIPPED | OUTPATIENT
Start: 2023-02-14 | End: 2023-02-14 | Stop reason: SDUPTHER

## 2023-02-14 RX ORDER — PHENYTOIN 50 MG/1
50 TABLET, CHEWABLE ORAL 3 TIMES DAILY
Qty: 90 TABLET | Refills: 3 | Status: SHIPPED | OUTPATIENT
Start: 2023-02-14

## 2023-02-14 RX ORDER — PHENYTOIN 50 MG/1
50 TABLET, CHEWABLE ORAL 3 TIMES DAILY
OUTPATIENT
Start: 2023-02-14

## 2023-02-14 NOTE — TELEPHONE ENCOUNTER
I KNOW YOU HAVE BEEN DISCUSSING WITH PATIENT'S MOTHER VIA [employer]- ARE YOU KEEPING HIM ON THIS ?

## 2023-02-14 NOTE — TELEPHONE ENCOUNTER
I think that he was only taking 200 mg night and I just bumped up to 250 mg night and sent in a Rx for the 50 mg tabs.  This Rx is likely not needed.

## 2023-02-28 ENCOUNTER — PATIENT MESSAGE (OUTPATIENT)
Dept: NEUROLOGY | Facility: CLINIC | Age: 53
End: 2023-02-28
Payer: MEDICARE

## 2023-02-28 DIAGNOSIS — Z79.899 HIGH RISK MEDICATION USE: ICD-10-CM

## 2023-02-28 DIAGNOSIS — G40.919 INTRACTABLE EPILEPSY WITHOUT STATUS EPILEPTICUS, UNSPECIFIED EPILEPSY TYPE: Primary | ICD-10-CM

## 2023-02-28 NOTE — TELEPHONE ENCOUNTER
From: Sreedhar Raines  To: Payton Harrington  Sent: 2/28/2023 1:40 PM EST  Subject: Kalpesh Harrington, Kalpesh is staggering and says he feels strange, and behavior has worsen. He has been on the new Dilantin dose for eight days, today makes nine days. Should he be cut back some?

## 2023-03-01 ENCOUNTER — LAB (OUTPATIENT)
Dept: LAB | Facility: HOSPITAL | Age: 53
End: 2023-03-01
Payer: MEDICARE

## 2023-03-01 DIAGNOSIS — Z79.899 HIGH RISK MEDICATION USE: ICD-10-CM

## 2023-03-01 DIAGNOSIS — G40.919 INTRACTABLE EPILEPSY WITHOUT STATUS EPILEPTICUS, UNSPECIFIED EPILEPSY TYPE: ICD-10-CM

## 2023-03-01 PROCEDURE — 80186 ASSAY OF PHENYTOIN FREE: CPT

## 2023-03-01 PROCEDURE — 80185 ASSAY OF PHENYTOIN TOTAL: CPT

## 2023-03-01 PROCEDURE — 36415 COLL VENOUS BLD VENIPUNCTURE: CPT

## 2023-03-06 ENCOUNTER — TELEPHONE (OUTPATIENT)
Dept: NEUROLOGY | Facility: CLINIC | Age: 53
End: 2023-03-06

## 2023-03-06 ENCOUNTER — PATIENT MESSAGE (OUTPATIENT)
Dept: NEUROLOGY | Facility: CLINIC | Age: 53
End: 2023-03-06
Payer: MEDICARE

## 2023-03-06 LAB
PHENYTOIN FREE SERPL-MCNC: 1.9 UG/ML (ref 1–2)
PHENYTOIN SERPL-MCNC: 32 UG/ML (ref 10–20)

## 2023-03-06 RX ORDER — ZONISAMIDE 100 MG/1
CAPSULE ORAL
Qty: 360 CAPSULE | Refills: 3 | Status: SHIPPED | OUTPATIENT
Start: 2023-03-06

## 2023-03-06 NOTE — TELEPHONE ENCOUNTER
Caller: FRANCISCO J KILGORE    Relationship: Mother    Best call back number: [phone]    What was the call regarding: PT'S MOTHER CALLED STATING SHE SHOWED UP TO JAY HOWELL WITH PT TODAY FOR HIM TO HAVE HIS MRI BRAIN SCAN COMPLETED. WHEN THEY ARRIVED, THEY WERE INFORMED MRI HAD BEEN CANCELLED AND WAS NOTED MRI WOULD BE COM[facility].  PT'S MOTHER CONTACTED [facility] BUT WAS ADVISED THAT THEY DO NOT HAVE MRI BRAIN ORDER.    PT'S MOTHER CALLED TO ASK IF ORDER FOR MRI BRAIN CAN BE REFAXED TO [facility]. [facility] ADVISED THEY WILL NEED PT'S VNS IMPLANT INFORMATION PRIOR TO SCHEDULING.    Do you require a callback: YES, PLEASE CONTACT ONCE WHEN COMPLETED.    PLEASE REVIEW AND ADVISE.

## 2023-03-06 NOTE — TELEPHONE ENCOUNTER
From: Sreedhar Raines  To: Payton Harrington  Sent: 3/6/2023 8:33 AM EST  Subject: Sreedhar Raines    Within one hour of Kalpesh taking his meds he is so unsteady on his feet that he cannot stand without falling. He fell and hit his head this morning. I don't understand why we don't have his lab results back yet but if not today I'm gonna have to reduce the Dilantin 50mg myself. He is scheduled for the MRI this morning and I don't even know if I can get him in the car. Can someone try to find out about his lab work done at [facility]. He needs help. Thank you.

## 2023-03-07 ENCOUNTER — TELEPHONE (OUTPATIENT)
Dept: NEUROLOGY | Facility: CLINIC | Age: 53
End: 2023-03-07
Payer: MEDICARE

## 2023-03-07 NOTE — TELEPHONE ENCOUNTER
Spoke with Liudmila, patients mother and made her aware of the rescheduled MRI Brain appointment.

## 2023-03-07 NOTE — TELEPHONE ENCOUNTER
Patient has been rescheduled for MRI Brain on 3/29/23. Patient needs to arrive at 2:30 pm the actual scan will begin at 3:00 pm. [place] [address]. Patient will register in the basement of [facility].

## 2023-04-03 ENCOUNTER — OFFICE VISIT (OUTPATIENT)
Dept: FAMILY MEDICINE CLINIC | Facility: CLINIC | Age: 53
End: 2023-04-03
Payer: MEDICARE

## 2023-04-03 VITALS
BODY MASS INDEX: 26.29 KG/M2 | WEIGHT: 154 LBS | SYSTOLIC BLOOD PRESSURE: 110 MMHG | DIASTOLIC BLOOD PRESSURE: 80 MMHG | OXYGEN SATURATION: 92 % | HEART RATE: 79 BPM | HEIGHT: 64 IN

## 2023-04-03 DIAGNOSIS — F39 MOOD DISORDER: ICD-10-CM

## 2023-04-03 DIAGNOSIS — R45.1 AGITATION: ICD-10-CM

## 2023-04-03 DIAGNOSIS — G40.814 INTRACTABLE LENNOX-GASTAUT SYNDROME WITHOUT STATUS EPILEPTICUS: ICD-10-CM

## 2023-04-03 DIAGNOSIS — R41.89 IMPAIRMENT OF COGNITIVE FUNCTION: ICD-10-CM

## 2023-04-03 DIAGNOSIS — Z00.00 MEDICARE ANNUAL WELLNESS VISIT, SUBSEQUENT: Primary | ICD-10-CM

## 2023-04-03 DIAGNOSIS — G80.2 SPASTIC HEMIPLEGIC CEREBRAL PALSY: ICD-10-CM

## 2023-04-03 PROCEDURE — 1170F FXNL STATUS ASSESSED: CPT | Performed by: STUDENT IN AN ORGANIZED HEALTH CARE EDUCATION/TRAINING PROGRAM

## 2023-04-03 PROCEDURE — 1159F MED LIST DOCD IN RCRD: CPT | Performed by: STUDENT IN AN ORGANIZED HEALTH CARE EDUCATION/TRAINING PROGRAM

## 2023-04-03 PROCEDURE — 1160F RVW MEDS BY RX/DR IN RCRD: CPT | Performed by: STUDENT IN AN ORGANIZED HEALTH CARE EDUCATION/TRAINING PROGRAM

## 2023-04-03 PROCEDURE — 99396 PREV VISIT EST AGE 40-64: CPT | Performed by: STUDENT IN AN ORGANIZED HEALTH CARE EDUCATION/TRAINING PROGRAM

## 2023-04-03 PROCEDURE — G0439 PPPS, SUBSEQ VISIT: HCPCS | Performed by: STUDENT IN AN ORGANIZED HEALTH CARE EDUCATION/TRAINING PROGRAM

## 2023-04-03 RX ORDER — LORAZEPAM 1 MG/1
1 TABLET ORAL EVERY 8 HOURS PRN
Qty: 60 TABLET | Refills: 0 | Status: SHIPPED | OUTPATIENT
Start: 2023-04-03

## 2023-04-03 NOTE — PROGRESS NOTES
The ABCs of the Annual Wellness Visit  Subsequent Medicare Wellness Visit    Subjective      Sreedhar Raines is a 52 y.o. male who presents for a Subsequent Medicare Wellness Visit.    The following portions of the patient's history were reviewed and   updated as appropriate: allergies, current medications, past family history, past medical history, past social history, past surgical history and problem list.    Compared to one year ago, the patient feels his physical   health is worse.    Compared to one year ago, the patient feels his mental   health is worse.    Recent Hospitalizations:  He was not admitted within the past 365 days at [facility].     Current Medical Providers:  Patient Care Team:  Todd Jiménez,  as PCP - General (Family Medicine)    Outpatient Medications Prior to Visit   Medication Sig Dispense Refill   • Dilantin 100 MG capsule TAKE 2 CAPSULES BY MOUTH EVERY NIGHT (Patient taking differently: Take 250 mg by mouth Every Night. Neurologist changed dosage) 60 capsule 5   • lactulose (CHRONULAC) 10 GM/15ML solution Take 30 mL by mouth 2 (Two) Times a Day As Needed (Constipation). 473 mL 1   • lamoTRIgine (LaMICtal) 100 MG tablet TAKE 2.5 TABLETS BY MOUTH TWICE A  tablet 3   • levocetirizine (XYZAL) 5 MG tablet Take 1 tablet by mouth Every Evening. 90 tablet 1   • montelukast (SINGULAIR) 10 MG tablet Take 1 tablet by mouth Every Night. 90 tablet 1   • Nayzilam 5 MG/0.1ML solution      • OLANZapine (zyPREXA) 5 MG tablet Take 1 tablet by mouth Every Night. 90 tablet 1   • phenytoin (DILANTIN) 50 MG chewable tablet Chew 1 tablet 3 (Three) Times a Day. 90 tablet 3   • polyethylene glycol (MIRALAX) 17 GM/SCOOP powder Take 17 g by mouth Daily. (Patient taking differently: Take 17 g by mouth Daily As Needed.) 850 g 1   • tamsulosin (FLOMAX) 0.4 MG capsule 24 hr capsule Take 1 capsule by mouth Daily. 90 capsule 1   • zonisamide (ZONEGRAN) 100 MG capsule TAKE 2 CAPSULES BY MOUTH  "TWICE DAILY 360 capsule 3   • LORazepam (ATIVAN) 1 MG tablet Take 1 tablet by mouth Every 8 (Eight) Hours As Needed for Anxiety. 60 tablet 0     No facility-administered medications prior to visit.       No opioid medication identified on active medication list. I have reviewed chart for other potential  high risk medication/s and harmful drug interactions in the elderly.          Aspirin is not on active medication list.  Aspirin use is not indicated based on review of current medical condition/s. Risk of harm outweighs potential benefits.  .    Patient Active Problem List   Diagnosis   • Epilepsy   • Encounter for interrogation of neurostimulator   • Spastic hemiplegic cerebral palsy (HCC)   • Mood disorder (HCC)   • Agitation   • Environmental and seasonal allergies   • Impairment of cognitive function     Advance Care Planning  Advance Directive is not on file.  ACP discussion was held with the patient during this visit. Patient has an advance directive (not in EMR), copy requested.     Objective    Vitals:    04/03/23 1412   BP: 110/80   Pulse: 79   SpO2: 92%   Weight: 69.9 kg (154 lb)   Height: 162.6 cm (64.02\")     Estimated body mass index is 26.42 kg/m² as calculated from the following:    Height as of this encounter: 162.6 cm (64.02\").    Weight as of this encounter: 69.9 kg (154 lb).    BMI is >= 25 and <30. (Overweight) The following options were offered after discussion;: exercise counseling/recommendations and nutrition counseling/recommendations    GEN: In no acute distress, non toxic appearing  HEENT: Pupils equal and reactive to light, sclera clear. Mucous membranes moist. Oropharynx without erythema or exudate. No cervical or submandibular lymphadenopathy.  Tms WNL bilaterally.    CV: Regular rate and rhythm, no murmurs, 2+ peripheral pulses, No extremity edema.   RESP: Lungs clear to auscultation anteriorly and posteriorly in all lung fields bilaterally.  NEURO: AAO to person, place, and time. CN " 2-12 intact grossly.   PSYCH: Affect normal, insight fair    Does the patient have evidence of cognitive impairment?   Yes: known intellectual disability    HEALTH RISK ASSESSMENT    Smoking Status:  Social History     Tobacco Use   Smoking Status Never   • Passive exposure: Never   Smokeless Tobacco Never     Alcohol Consumption:  Social History     Substance and Sexual Activity   Alcohol Use Never     Fall Risk Screen:    KATELYN Fall Risk Assessment was completed, and patient is at LOW risk for falls.Assessment completed on:4/3/2023    Depression Screening:  PHQ-2/PHQ-9 Depression Screening 4/3/2023   Little Interest or Pleasure in Doing Things 1-->several days   Feeling Down, Depressed or Hopeless 1-->several days   PHQ-9: Brief Depression Severity Measure Score 2       Health Habits and Functional and Cognitive Screening:  Functional & Cognitive Status 4/3/2023   Do you have difficulty preparing food and eating? Yes   Do you have difficulty bathing yourself, getting dressed or grooming yourself? Yes   Do you have difficulty using the toilet? No   Do you have difficulty moving around from place to place? No   Do you have trouble with steps or getting out of a bed or a chair? Yes   Current Diet Well Balanced Diet   Dental Exam Up to date   Eye Exam Not up to date   Exercise (times per week) 0 times per week   Current Exercises Include No Regular Exercise   Current Exercise Activities Include -   Do you need help using the phone?  No   Are you deaf or do you have serious difficulty hearing?  No   Do you need help with transportation? Yes   Do you need help shopping? Yes   Do you need help preparing meals?  Yes   Do you need help with housework?  Yes   Do you need help with laundry? Yes   Do you need help taking your medications? Yes   Do you need help managing money? Yes   Do you ever drive or ride in a car without wearing a seat belt? Yes   Have you felt unusual stress, anger or loneliness in the last month? -    Who do you live with? -   If you need help, do you have trouble finding someone available to you? -   Have you been bothered in the last four weeks by sexual problems? -   Do you have difficulty concentrating, remembering or making decisions? -       Age-appropriate Screening Schedule:  Refer to the list below for future screening recommendations based on patient's age, sex and/or medical conditions. Orders for these recommended tests are listed in the plan section. The patient has been provided with a written plan.    Health Maintenance   Topic Date Due   • TDAP/TD VACCINES (1 - Tdap) Never done   • ZOSTER VACCINE (1 of 2) Never done   • ANNUAL WELLNESS VISIT  09/15/2022   • COVID-19 Vaccine (4 - Booster for Pfizer series) 04/04/2023 (Originally 12/10/2021)   • LIPID PANEL  05/04/2023   • INFLUENZA VACCINE  08/01/2023   • COLORECTAL CANCER SCREENING  09/13/2026   • HEPATITIS C SCREENING  Completed   • Pneumococcal Vaccine 0-64  Aged Out              CMS Preventative Services Quick Reference  Risk Factors Identified During Encounter:    Inactivity/Sedentary: Patient was advised to exercise at least 150 minutes a week per CDC recommendations.    The above risks/problems have been discussed with the patient.  Pertinent information has been shared with the patient in the After Visit Summary.    Diagnoses and all orders for this visit:    1. Medicare annual wellness visit, subsequent (Primary)  Overall reassuring exam.  BP at goal.  UTD on colon ca screening.  Urology monitoring PSA.  Encourage regular exercise.    Trying to find opportunities for him to be involved in social settings.    2. Impairment of cognitive function    3. Mood disorder (HCC)  Nightly zyprexa w/ prn ativan.    -     LORazepam (ATIVAN) 1 MG tablet; Take 1 tablet by mouth Every 8 (Eight) Hours As Needed for Anxiety.  Dispense: 60 tablet; Refill: 0    4. 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  On dilantin  and lamictal.      5. Agitation  See above   Orders:  -     LORazepam (ATIVAN) 1 MG tablet; Take 1 tablet by mouth Every 8 (Eight) Hours As Needed for Anxiety.  Dispense: 60 tablet; Refill: 0    6. Spastic hemiplegic cerebral palsy  -     LORazepam (ATIVAN) 1 MG tablet; Take 1 tablet by mouth Every 8 (Eight) Hours As Needed for Anxiety.  Dispense: 60 tablet; Refill: 0    Follow Up:   Next Medicare Wellness visit to be scheduled in 1 year.  Recheck in 6 months.      An After Visit Summary and PPPS were made available to the patient.

## 2023-04-19 ENCOUNTER — TELEPHONE (OUTPATIENT)
Dept: NEUROLOGY | Facility: CLINIC | Age: 53
End: 2023-04-19
Payer: MEDICARE

## 2023-04-19 NOTE — TELEPHONE ENCOUNTER
Please place new order for MRI Brain. Under scheduling comments please document patient has a VNS and he needs to be on 3T machine. Also this can only be done at [facility], due to [facility] not having a 3T machine.

## 2023-06-11 DIAGNOSIS — J30.89 ENVIRONMENTAL AND SEASONAL ALLERGIES: ICD-10-CM

## 2023-06-12 RX ORDER — MONTELUKAST SODIUM 10 MG/1
10 TABLET ORAL NIGHTLY
Qty: 90 TABLET | Refills: 1 | Status: SHIPPED | OUTPATIENT
Start: 2023-06-12

## 2023-06-12 RX ORDER — PHENYTOIN SODIUM 100 MG/1
200 CAPSULE, EXTENDED RELEASE ORAL NIGHTLY
Qty: 60 CAPSULE | Refills: 5 | Status: SHIPPED | OUTPATIENT
Start: 2023-06-12

## 2023-06-15 DIAGNOSIS — R45.1 AGITATION: ICD-10-CM

## 2023-06-15 DIAGNOSIS — F39 MOOD DISORDER: ICD-10-CM

## 2023-06-15 RX ORDER — OLANZAPINE 5 MG/1
5 TABLET ORAL NIGHTLY
Qty: 90 TABLET | Refills: 1 | Status: SHIPPED | OUTPATIENT
Start: 2023-06-15

## 2023-06-15 NOTE — TELEPHONE ENCOUNTER
Caller: FRANCISCO J KILGORE    Relationship: Mother    Best call back number:[phone]     Requested Prescriptions:   Requested Prescriptions     Pending Prescriptions Disp Refills   • OLANZapine (zyPREXA) 5 MG tablet 90 tablet 1     Sig: Take 1 tablet by mouth Every Night.        Pharmacy where request should be sent: [id number] [address] AT [place] [address] & [facility] - [phone] [facility] [phone] FX     Last office visit with prescribing clinician: 4/3/2023   Last telemedicine visit with prescribing clinician: Visit date not found   Next office visit with prescribing clinician: 10/3/2023     Additional details provided by patient:     Does the patient have less than a 3 day supply:  [x] Yes  [] No    Would you like a call back once the refill request has been completed: [x] Yes [] No    If the office needs to give you a call back, can they leave a voicemail: [x] Yes [] No    Viviana Ignacio Rep   06/15/23 11:19 EDT

## 2023-08-08 ENCOUNTER — OFFICE VISIT (OUTPATIENT)
Dept: NEUROLOGY | Facility: CLINIC | Age: 53
End: 2023-08-08
Payer: MEDICARE

## 2023-08-08 VITALS
HEIGHT: 64 IN | HEART RATE: 78 BPM | OXYGEN SATURATION: 98 % | DIASTOLIC BLOOD PRESSURE: 74 MMHG | WEIGHT: 150 LBS | SYSTOLIC BLOOD PRESSURE: 124 MMHG | BODY MASS INDEX: 25.61 KG/M2

## 2023-08-08 DIAGNOSIS — Z79.899 HIGH RISK MEDICATION USE: ICD-10-CM

## 2023-08-08 DIAGNOSIS — G40.919 INTRACTABLE EPILEPSY WITHOUT STATUS EPILEPTICUS, UNSPECIFIED EPILEPSY TYPE: Primary | ICD-10-CM

## 2023-08-08 DIAGNOSIS — Z46.2 ENCOUNTER FOR INTERROGATION OF NEUROSTIMULATOR: ICD-10-CM

## 2023-08-08 PROCEDURE — 1159F MED LIST DOCD IN RCRD: CPT | Performed by: PSYCHIATRY & NEUROLOGY

## 2023-08-08 PROCEDURE — 99215 OFFICE O/P EST HI 40 MIN: CPT | Performed by: PSYCHIATRY & NEUROLOGY

## 2023-08-08 PROCEDURE — 1160F RVW MEDS BY RX/DR IN RCRD: CPT | Performed by: PSYCHIATRY & NEUROLOGY

## 2023-08-08 PROCEDURE — 95970 ALYS NPGT W/O PRGRMG: CPT | Performed by: PSYCHIATRY & NEUROLOGY

## 2023-08-08 RX ORDER — PERAMPANEL 8 MG/1
8 TABLET ORAL DAILY
Qty: 30 TABLET | Refills: 5 | Status: SHIPPED | OUTPATIENT
Start: 2023-08-08

## 2023-08-08 RX ORDER — PERAMPANEL 6 MG/1
6 TABLET ORAL DAILY
Qty: 30 TABLET | Refills: 0 | Status: SHIPPED | OUTPATIENT
Start: 2023-08-08

## 2023-08-08 RX ORDER — PERAMPANEL 4 MG/1
4 TABLET ORAL DAILY
Qty: 30 TABLET | Refills: 0 | Status: SHIPPED | OUTPATIENT
Start: 2023-08-08

## 2023-08-08 NOTE — PATIENT INSTRUCTIONS
[facility]  Payton Harrington MD  Neurology clinic  [phone]    With anti-seizure medications, you may initially notice side effects of fatigue, drowsiness, unsteadiness, and dizziness.  Other possible side effects include nausea, abdominal pain, headache, blurry or double vision, slurred speech and mood changes.  Generally, patients will noticed these symptoms when the medication is first started or with higher doses and will go away with time.    It is import to consistently take your medication every day.  Missing just one dose may put you at risk for a breakthrough seizure.  Consider using reminders on your phone or a pill box.    If you develop a rash, please call the neurology clinic immediately or notify another healthcare professional, as this may be potentially life-threatening.  If you are unable to reach a healthcare professional, go to the emergency room immediately for further evaluation.    If you develop thoughts of wanting to hurt yourself or others, please call the neurology clinic immediately to notify another healthcare professional.  If you are unable to reach a healthcare professional, go to the emergency room immediately for further evaluation.    It is the [place] state law that you cannot drive within 90 days of a seizure.    You should avoid certain activities that if you were to have a seizure, you could harm yourself or others. In general, it is recommended that you avoid operating heavy machinery or power tools, swimming or taking baths by yourself (showers are ok), don't stand over open flames, don't get on high ladders or the roof.  I also recommend to avoid sleeping on your stomach.    For further information on epilepsy and resources available to patients and their families, please visit the Epilepsy Foundation of [facility] of hospitals in the [place] at www.efky.org or call [phone].    **Check out the Epilepsy Foundation of [facility] of [facility] in the [place]'s monthly Art Group Gathering.  They are  located at [facility], [address] [place] [address].  Call Molly Hebert at [phone] or email her at [email or web address] for the dates of future gatherings.**      **If you have having memory problems, consider HOBSCOTCH (Home-Based Self-management and Cognitive Training Changes lives).  It is an 8 week self-management program for adults with epilepsy and memory problems.  The program is free at the [facility].  Contact Love Rojas at [phone] or [email or web address].**          **Check out their seizure first aid training and certification courses.      **Consider calling the Epilepsy Foundation's 24/7 helpline if you have questions:  [phone]    **If you are interested in the Ketogenic Diet, check out [email or web address].      **Consider seizure monitoring wrist-worn wearable devices.  You can download apps to your Apple Watch like [employer] or purchase the My Ad Box device.

## 2023-08-08 NOTE — PROGRESS NOTES
Subjective:     Patient ID: Sreedhar Raines is a 53 y.o. male.    History of Present Illness  Kalpesh is a 53-year-old male with a history of allergies, osteoporosis, kidney stones, developmental delay, intractable epilepsy status post VNS, and cerebral palsy who presents to the neurology clinic today as established patient for follow-up for seizures.  Patient was last seen on February 8, 2023.  He was new patient to me back in October 2019.  The patient failed Depakote, carbamazepine, levetiracetam, topiramate, Briviact, Onfi, Epidiolex and phenobarbital in the past.  He is currently on name brand only Dilantin 250 mg at night, lamotrigine 250 mg twice a day, and zonisamide 200 mg twice a day.  Unfortunately his seizures have gotten worse.  Mom thought that they are worse in 2022 compared to 2021.  Since his last visit he had greater than 20 seizures in March and April but then from May until now he has had about 2 seizures per month.  The patient typically has generalized tonic-clonic seizures.  When he has a seizure mom swipes the VNS and sometimes they may stop.  They last less than 3 minutes and she has not had to use the Nayzilam since his last visit.  He has had the VNS for 6 years.  Mom has felt like it has been helpful.  He was in the epilepsy monitoring unit in [place] back in January.  The report was a little confusing and stated that his seizures were likely coming from the left hemisphere but then later reported multifocal seizures.  The patient also had an MRI of the brain done with and without contrast on July 11 that showed extensive encephalomalacia involving the left frontal, parietal and occipital lobes.  There is also encephalomalacia involving the right frontal lobe as well.  Fortunately the rash that he had experienced at his last visit has since resolved.  He did have some balance issues but that improved with adjusting his Dilantin.  He does not have seizure clusters.  The following portions of the  patient's history were reviewed and updated as appropriate: allergies, current medications, past family history, past medical history, past social history, past surgical history, and problem list.    Review of Systems     Objective:    Neurological Exam    Physical Exam    I interrogated the patient's VNS today.  They tolerated the procedure well without any adverse side effects.  Below are the current settings.        Assessment/Plan:    The patient is a 53-year-old male with a history of allergies, osteoporosis, kidney stones, developmental delay, and intractable epilepsy status post VNS, and cerebral palsy who presents today for follow-up.    1.  Intractable epilepsy status post VNS-the patient continues to have uncontrolled seizures.  In the short-term mom would like to transition the zonisamide to a different medication because of his kidney stones.  I am a little concerned about Xcopri possibly interacting with Dilantin since it has been difficult to dose in the past.  Therefore we decided to go with Fycompa.  We did discuss the possible side effect of mood changes.  We will slowly transition him from zonisamide to Fycompa.  The following schedule was given to mom.  We will continue the Dilantin and lamotrigine at the current dose with no changes.  Long-term I will present him at the next epilepsy surgery conference to see what other surgical options may be appropriate.  Mom was given 3 different prescriptions for the Fycompa doses 4 mg, 6 mg, and 8 mg.  She may need new prescription when she lowers the zonisamide to 50 mg twice a day.      A total of 50 minutes of time was spent on this encounter today.  This includes reviewing patient's records, face-to-face time, and documentation.    I spent 2 minutes on the separately reported service of interogation.  This time is not included in the time used to support the E/M service also reported today.         Problems Addressed this Visit          Neuro    Epilepsy  - Primary    Relevant Medications    Perampanel (Fycompa) 4 MG tablet    Perampanel (Fycompa) 6 MG tablet    Perampanel (Fycompa) 8 MG tablet    Encounter for interrogation of neurostimulator     Other Visit Diagnoses       High risk medication use              Diagnoses         Codes Comments    Intractable epilepsy without status epilepticus, unspecified epilepsy type    -  Primary ICD-10-CM: G40.919  ICD-9-CM: 345.91     High risk medication use     ICD-10-CM: Z79.899  ICD-9-CM: V58.69     Encounter for interrogation of neurostimulator     ICD-10-CM: Z46.2  ICD-9-CM: V53.02

## 2023-08-08 NOTE — LETTER
August 8, 2023     Todd Jiménez DO  [address]  [place] [address]    Patient: Sreedhar Raines   YOB: 1970   Date of Visit: 8/8/2023     Dear Todd Jiménez DO:       Thank you for referring Sreedhar Raines to me for evaluation. Below are the relevant portions of my assessment and plan of care.    If you have questions, please do not hesitate to call me. I look forward to following Sreedhar along with you.         Sincerely,        Payton Harrington MD        CC: No Recipients    Payton Harrington MD  08/08/23 [id number]  Sign when Signing Visit  Subjective:     Patient ID: Sreedhar Raines is a 53 y.o. male.    History of Present Illness  Kalpesh is a 53-year-old male with a history of allergies, osteoporosis, kidney stones, developmental delay, intractable epilepsy status post VNS, and cerebral palsy who presents to the neurology clinic today as established patient for follow-up for seizures.  Patient was last seen on February 8, 2023.  He was new patient to me back in October 2019.  The patient failed Depakote, carbamazepine, levetiracetam, topiramate, Briviact, Onfi, Epidiolex and phenobarbital in the past.  He is currently on name brand only Dilantin 250 mg at night, lamotrigine 250 mg twice a day, and zonisamide 200 mg twice a day.  Unfortunately his seizures have gotten worse.  Mom thought that they are worse in 2022 compared to 2021.  Since his last visit he had greater than 20 seizures in March and April but then from May until now he has had about 2 seizures per month.  The patient typically has generalized tonic-clonic seizures.  When he has a seizure mom swipes the VNS and sometimes they may stop.  They last less than 3 minutes and she has not had to use the Nayzilam since his last visit.  He has had the VNS for 6 years.  Mom has felt like it has been helpful.  He was in the epilepsy monitoring unit in [place] back in January.  The report was a little  confusing and stated that his seizures were likely coming from the left hemisphere but then later reported multifocal seizures.  The patient also had an MRI of the brain done with and without contrast on July 11 that showed extensive encephalomalacia involving the left frontal, parietal and occipital lobes.  There is also encephalomalacia involving the right frontal lobe as well.  Fortunately the rash that he had experienced at his last visit has since resolved.  He did have some balance issues but that improved with adjusting his Dilantin.  He does not have seizure clusters.  The following portions of the patient's history were reviewed and updated as appropriate: allergies, current medications, past family history, past medical history, past social history, past surgical history, and problem list.    Review of Systems     Objective:    Neurological Exam    Physical Exam    I interrogated the patient's VNS today.  They tolerated the procedure well without any adverse side effects.  Below are the current settings.        Assessment/Plan:    The patient is a 53-year-old male with a history of allergies, osteoporosis, kidney stones, developmental delay, and intractable epilepsy status post VNS, and cerebral palsy who presents today for follow-up.    1.  Intractable epilepsy status post VNS-the patient continues to have uncontrolled seizures.  In the short-term mom would like to transition the zonisamide to a different medication because of his kidney stones.  I am a little concerned about Xcopri possibly interacting with Dilantin since it has been difficult to dose in the past.  Therefore we decided to go with Fycompa.  We did discuss the possible side effect of mood changes.  We will slowly transition him from zonisamide to Fycompa.  The following schedule was given to mom.  We will continue the Dilantin and lamotrigine at the current dose with no changes.  Long-term I will present him at the next epilepsy surgery  conference to see what other surgical options may be appropriate.  Mom was given 3 different prescriptions for the Fycompa doses 4 mg, 6 mg, and 8 mg.  She may need new prescription when she lowers the zonisamide to 50 mg twice a day.      A total of 50 minutes of time was spent on this encounter today.  This includes reviewing patient's records, face-to-face time, and documentation.    I spent 2 minutes on the separately reported service of interogation.  This time is not included in the time used to support the E/M service also reported today.         Problems Addressed this Visit          Neuro    Epilepsy - Primary    Relevant Medications    Perampanel (Fycompa) 4 MG tablet    Perampanel (Fycompa) 6 MG tablet    Perampanel (Fycompa) 8 MG tablet    Encounter for interrogation of neurostimulator     Other Visit Diagnoses       High risk medication use              Diagnoses         Codes Comments    Intractable epilepsy without status epilepticus, unspecified epilepsy type    -  Primary ICD-10-CM: G40.919  ICD-9-CM: 345.91     High risk medication use     ICD-10-CM: Z79.899  ICD-9-CM: V58.69     Encounter for interrogation of neurostimulator     ICD-10-CM: Z46.2  ICD-9-CM: V53.02

## 2023-08-10 DIAGNOSIS — J30.89 ENVIRONMENTAL AND SEASONAL ALLERGIES: ICD-10-CM

## 2023-08-10 RX ORDER — LEVOCETIRIZINE DIHYDROCHLORIDE 5 MG/1
5 TABLET, FILM COATED ORAL EVERY EVENING
Qty: 90 TABLET | Refills: 1 | Status: SHIPPED | OUTPATIENT
Start: 2023-08-10

## 2023-08-17 ENCOUNTER — PATIENT MESSAGE (OUTPATIENT)
Dept: NEUROLOGY | Facility: CLINIC | Age: 53
End: 2023-08-17
Payer: MEDICARE

## 2023-08-17 NOTE — TELEPHONE ENCOUNTER
From: Sreedhar Raines  To: Payton Harrington  Sent: 8/17/2023 10:37 AM EDT  Subject: Sreedhar Raines...Fycompa    Dr. Harrington my pharmacy finally got the Fycompa in and was told my out of pocket cost would be $400.00. I can't afford that kind of co-pay. So Kalpesh will stay on the zonisamide for now. Thanks so much for your help on this.

## 2023-08-29 ENCOUNTER — TELEPHONE (OUTPATIENT)
Dept: NEUROLOGY | Facility: CLINIC | Age: 53
End: 2023-08-29
Payer: MEDICARE

## 2023-08-31 ENCOUNTER — PATIENT MESSAGE (OUTPATIENT)
Dept: NEUROLOGY | Facility: CLINIC | Age: 53
End: 2023-08-31
Payer: MEDICARE

## 2023-09-02 DIAGNOSIS — N20.0 NEPHROLITHIASIS: ICD-10-CM

## 2023-09-04 RX ORDER — TAMSULOSIN HYDROCHLORIDE 0.4 MG/1
1 CAPSULE ORAL DAILY
Qty: 90 CAPSULE | Refills: 1 | Status: SHIPPED | OUTPATIENT
Start: 2023-09-04

## 2023-09-05 ENCOUNTER — DOCUMENTATION (OUTPATIENT)
Dept: NEUROLOGY | Facility: CLINIC | Age: 53
End: 2023-09-05
Payer: MEDICARE

## 2023-09-07 ENCOUNTER — PATIENT MESSAGE (OUTPATIENT)
Dept: NEUROLOGY | Facility: CLINIC | Age: 53
End: 2023-09-07
Payer: MEDICARE

## 2023-09-07 DIAGNOSIS — G40.919 INTRACTABLE EPILEPSY WITHOUT STATUS EPILEPTICUS, UNSPECIFIED EPILEPSY TYPE: Primary | ICD-10-CM

## 2023-09-07 NOTE — TELEPHONE ENCOUNTER
From: Sreedhar Raines  To: Payton Harrington  Sent: 9/7/2023 10:13 AM EDT  Subject: Kalpesh Raines    Morning Dr. Harrington. I have decided to pursue the [facility] RNS possibility with a surgeon. I have a lot of questions for them before making a final decision. So if you would please go ahead and get the ball rolling on the referral. Thank you very much for all your efforts on our behalf.

## 2023-09-11 DIAGNOSIS — F39 MOOD DISORDER: ICD-10-CM

## 2023-09-11 DIAGNOSIS — R45.1 AGITATION: ICD-10-CM

## 2023-09-11 DIAGNOSIS — G80.2 SPASTIC HEMIPLEGIC CEREBRAL PALSY: ICD-10-CM

## 2023-09-11 RX ORDER — LORAZEPAM 1 MG/1
TABLET ORAL
Qty: 60 TABLET | Refills: 1 | Status: SHIPPED | OUTPATIENT
Start: 2023-09-11

## 2023-09-25 DIAGNOSIS — G40.919 INTRACTABLE EPILEPSY WITHOUT STATUS EPILEPTICUS, UNSPECIFIED EPILEPSY TYPE: ICD-10-CM

## 2023-09-26 RX ORDER — LACOSAMIDE 50 MG/1
TABLET ORAL
Qty: 196 TABLET | Refills: 0 | Status: SHIPPED | OUTPATIENT
Start: 2023-09-26

## 2023-09-26 NOTE — TELEPHONE ENCOUNTER
Weeks 5&6: Decrease Dilantin to 150 mg nightly.  Increase lacosamide to 150 mg twice daily.  Weeks 7&8: Decrease Dilantin to 100 mg nightly.  Increase lacosamide to 200 mg twice daily.     Weeks 9&10: Decrease Dilantin to 50 mg nightly.  Continue on lacosamide 200 mg twice daily.  Weeks 11&12: Stop Dilantin.  Continue on lacosamide 200 mg twice daily.        SCRIPT FOR WEEKS 5&6 AND 7&8 FOR LACOSAMIDE SENT TO PHARM

## 2023-09-27 ENCOUNTER — TELEPHONE (OUTPATIENT)
Dept: NEUROLOGY | Facility: CLINIC | Age: 53
End: 2023-09-27
Payer: MEDICARE

## 2023-09-27 NOTE — TELEPHONE ENCOUNTER
PATIENTS MOTHER CALLING REGARDING PRESCRIPTION FOR LACOSAMIDE 50 MG TABLET.    PHARMACY IS TELLING HER THEY CAN ONLY FILL FOR THREE-  3 TABLETS PER DAY.       PRIOR AUTH SENT BY PHARMACY BUT NOT RETURNED     PATIENT WILL BE OUT MEDICATION TOMORROW    PLEASE SEND PRIOR AUTH AND SPEAK WITH PHARMACY REGARDING DOSE

## 2023-10-03 ENCOUNTER — OFFICE VISIT (OUTPATIENT)
Dept: FAMILY MEDICINE CLINIC | Facility: CLINIC | Age: 53
End: 2023-10-03
Payer: MEDICARE

## 2023-10-03 VITALS
OXYGEN SATURATION: 98 % | BODY MASS INDEX: 25.95 KG/M2 | HEIGHT: 64 IN | DIASTOLIC BLOOD PRESSURE: 78 MMHG | HEART RATE: 74 BPM | SYSTOLIC BLOOD PRESSURE: 122 MMHG | RESPIRATION RATE: 16 BRPM | WEIGHT: 152 LBS

## 2023-10-03 DIAGNOSIS — K44.9 HIATAL HERNIA: Primary | ICD-10-CM

## 2023-10-03 DIAGNOSIS — F39 MOOD DISORDER: ICD-10-CM

## 2023-10-03 DIAGNOSIS — G80.2 SPASTIC HEMIPLEGIC CEREBRAL PALSY: ICD-10-CM

## 2023-10-03 DIAGNOSIS — K21.9 GASTROESOPHAGEAL REFLUX DISEASE WITHOUT ESOPHAGITIS: ICD-10-CM

## 2023-10-03 DIAGNOSIS — R45.1 AGITATION: ICD-10-CM

## 2023-10-03 DIAGNOSIS — G40.814 INTRACTABLE LENNOX-GASTAUT SYNDROME WITHOUT STATUS EPILEPTICUS: ICD-10-CM

## 2023-10-03 PROCEDURE — 1160F RVW MEDS BY RX/DR IN RCRD: CPT | Performed by: STUDENT IN AN ORGANIZED HEALTH CARE EDUCATION/TRAINING PROGRAM

## 2023-10-03 PROCEDURE — 1159F MED LIST DOCD IN RCRD: CPT | Performed by: STUDENT IN AN ORGANIZED HEALTH CARE EDUCATION/TRAINING PROGRAM

## 2023-10-03 PROCEDURE — 99214 OFFICE O/P EST MOD 30 MIN: CPT | Performed by: STUDENT IN AN ORGANIZED HEALTH CARE EDUCATION/TRAINING PROGRAM

## 2023-10-03 RX ORDER — OMEPRAZOLE 40 MG/1
40 CAPSULE, DELAYED RELEASE ORAL DAILY
Qty: 90 CAPSULE | Refills: 1 | Status: SHIPPED | OUTPATIENT
Start: 2023-10-03

## 2023-10-03 NOTE — PROGRESS NOTES
"Chief Complaint  Chief Complaint   Patient presents with    Mood disorder     Subjective        Sreedhar Raines is a 53 y.o. male who presents to [facility].    History of Present Illness  Here with his mother for follow up.  On zyprexa 5 mg nightly w/ prn ativan for agitation.    On flomax 0.4 mg daily for BPH.    Current epilepsy regimen managed by neurology.    He has been told in the past he has a hiatal hernia.    Recently he has been having upper abd pain, burping, discomfort, jeff following dinner.      Objective   /78   Pulse 74   Resp 16   Ht 162.6 cm (64\")   Wt 68.9 kg (152 lb)   SpO2 98%   BMI 26.09 kg/m²     Estimated body mass index is 26.09 kg/m² as calculated from the following:    Height as of this encounter: 162.6 cm (64\").    Weight as of this encounter: 68.9 kg (152 lb).     Physical Exam   GEN: In no acute distress, non toxic appearing  CV: Regular rate and rhythm, no murmurs, 2+ peripheral pulses  ABD: soft, nontender, nondistended     Result Review :              Assessment and Plan     Diagnoses and all orders for this visit:    1. Hiatal hernia (Primary)  Treat with omeprazole 40 mg daily.  Father with stomach cancer so recommended GI f/u for EGD.  Mother deferred today because of so many other health changes going on right now, but will plan of completing in 6 - 12 months.      2. Mood disorder  Doing well on zyprexa 5 mg nightly and prn ativan    3. 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  Managed by neurology.    4. Agitation  See above    5. Spastic hemiplegic cerebral palsy  See above     6. Gastroesophageal reflux disease without esophagitis  See above   -     omeprazole (priLOSEC) 40 MG capsule; Take 1 capsule by mouth Daily.  Dispense: 90 capsule; Refill: 1        Follow Up     Return in about 6 months (around 4/3/2024) for Medicare Wellness.    "

## 2023-10-23 DIAGNOSIS — G40.919 INTRACTABLE EPILEPSY WITHOUT STATUS EPILEPTICUS, UNSPECIFIED EPILEPSY TYPE: ICD-10-CM

## 2023-10-24 RX ORDER — LACOSAMIDE 100 MG/1
TABLET ORAL
Qty: 120 TABLET | Refills: 2 | Status: SHIPPED | OUTPATIENT
Start: 2023-10-24

## 2023-10-24 NOTE — TELEPHONE ENCOUNTER
Weeks 1&2: Continue Dilantin 250 mg nightly.  Add lacosamide 50 mg twice daily.  Weeks 3&4: Decrease Dilantin to 200 mg nightly.  Increase lacosamide to 100 mg twice daily.     Weeks 5&6: Decrease Dilantin to 150 mg nightly.  Increase lacosamide to 150 mg twice daily.  Weeks 7&8: Decrease Dilantin to 100 mg nightly.  Increase lacosamide to 200 mg twice daily.     Weeks 9&10: Decrease Dilantin to 50 mg nightly.  Continue on lacosamide 200 mg twice daily.  Weeks 11&12: Stop Dilantin.  Continue on lacosamide 200 mg twice daily      PATIENT IS STARTING WEEK 8 THIS WEEK, DOING WELL ON CURRENT REGIMEN.  NEEDS SCRIPT  MG BID SENT TO PHARMACY AS THAT IS HIS TARGET DOSE    MOTHER WANTING TO KNOW IF WE CAN CHANGE TO HIGHER MG SO HE DOES NOT HAVE TO TAKE AS MANY PILLS.  I TOLD HER THAT WE CAN SEND SCRIPT  MG ( 2) TWICE A DAY TO PHARMACY

## 2023-11-15 ENCOUNTER — PATIENT MESSAGE (OUTPATIENT)
Dept: NEUROLOGY | Facility: CLINIC | Age: 53
End: 2023-11-15
Payer: MEDICARE

## 2023-11-15 DIAGNOSIS — G40.919 INTRACTABLE EPILEPSY WITHOUT STATUS EPILEPTICUS, UNSPECIFIED EPILEPSY TYPE: Primary | ICD-10-CM

## 2023-11-15 RX ORDER — LACOSAMIDE 50 MG/1
TABLET ORAL
Qty: 540 TABLET | Refills: 1 | Status: SHIPPED | OUTPATIENT
Start: 2023-11-15

## 2023-11-15 NOTE — TELEPHONE ENCOUNTER
"From: Sreedhar Raines  To: Payton Harrington  Sent: 11/15/2023 10:04 AM EST  Subject: Kalpesh Raines lacosamide dosages     Hi Dr. Harrington. Kalpesh has been on lacosamide 200 mg for 30 days now and off of Dilantin for 5 days. When we started the 200 mg Kalpesh started being \"wobbly\" and shaky and can't safely walk for about 3 hours after morning dose. I was hoping after we stopped Dilantin that these side-effects would subside but that's not happening. He did fine on the 150mg dose. Your office rescheduled our appointment from December 5th to the 20th. Kalpesh is scheduled for the RNS surgery beginning on the Dec 13th and second surgery on the 20th so we won't be able to keep our appointment with you if all goes according to plan. Can we either see you earlier in December or you make any changes now?  "

## 2023-12-08 DIAGNOSIS — J30.89 ENVIRONMENTAL AND SEASONAL ALLERGIES: ICD-10-CM

## 2023-12-08 RX ORDER — MONTELUKAST SODIUM 10 MG/1
10 TABLET ORAL NIGHTLY
Qty: 90 TABLET | Refills: 1 | Status: SHIPPED | OUTPATIENT
Start: 2023-12-08

## 2023-12-11 DIAGNOSIS — F39 MOOD DISORDER: ICD-10-CM

## 2023-12-11 DIAGNOSIS — R45.1 AGITATION: ICD-10-CM

## 2023-12-12 RX ORDER — OLANZAPINE 5 MG/1
5 TABLET ORAL NIGHTLY
Qty: 90 TABLET | Refills: 1 | Status: SHIPPED | OUTPATIENT
Start: 2023-12-12

## 2024-01-28 NOTE — TELEPHONE ENCOUNTER
From: Sreedhar Raines  To: Payton Harrington MD  Sent: 5/7/2021 8:15 AM EDT  Subject: Non-Urgent Medical Question    Good morning Dr. HarringtonCalin Posey has been on the Epidiolex for two weeks now. He has had two grandmal seizures and intermitten shoulder jerking. Both are less in intensity than before the Epidiolex. The VNS worked with the grandmals whereas it wasn't effective at all before. He is not experiencing any sort of side effects. Do we continue? Same dosage? Thank you.  
Please review.     Thank you  
(1) Female

## 2024-02-09 ENCOUNTER — TELEPHONE (OUTPATIENT)
Dept: NEUROLOGY | Facility: CLINIC | Age: 54
End: 2024-02-09

## 2024-02-09 NOTE — TELEPHONE ENCOUNTER
Caller: EDUARDO    Relationship: NURSE  SHANIQUEMASON     Sher call back number: [phone]    What was the call regarding: EDUARDO NEEDS TO KEEP RECORD WITH THE PATIENTS NEURO CARE AND PROVIDERS. SHE NEEDS AN UPDATE AS TO WHO THE PATIENT WILL BE FOLLOWING WITH NOW THAT DR. JONES IS LEAVING.     PLEASE REVIEW  THANK YOU

## 2024-02-14 DIAGNOSIS — J30.89 ENVIRONMENTAL AND SEASONAL ALLERGIES: ICD-10-CM

## 2024-02-15 RX ORDER — LEVOCETIRIZINE DIHYDROCHLORIDE 5 MG/1
5 TABLET, FILM COATED ORAL EVERY EVENING
Qty: 90 TABLET | Refills: 1 | Status: SHIPPED | OUTPATIENT
Start: 2024-02-15

## 2024-03-13 DIAGNOSIS — N20.0 NEPHROLITHIASIS: ICD-10-CM

## 2024-03-13 RX ORDER — TAMSULOSIN HYDROCHLORIDE 0.4 MG/1
1 CAPSULE ORAL DAILY
Qty: 90 CAPSULE | Refills: 1 | Status: SHIPPED | OUTPATIENT
Start: 2024-03-13

## 2024-03-15 DIAGNOSIS — R45.1 AGITATION: ICD-10-CM

## 2024-03-15 DIAGNOSIS — F39 MOOD DISORDER: ICD-10-CM

## 2024-03-15 DIAGNOSIS — G80.2 SPASTIC HEMIPLEGIC CEREBRAL PALSY: ICD-10-CM

## 2024-03-15 RX ORDER — LORAZEPAM 1 MG/1
TABLET ORAL
Qty: 90 TABLET | Refills: 0 | Status: SHIPPED | OUTPATIENT
Start: 2024-03-15

## 2024-04-04 DIAGNOSIS — K21.9 GASTROESOPHAGEAL REFLUX DISEASE WITHOUT ESOPHAGITIS: ICD-10-CM

## 2024-04-04 DIAGNOSIS — K44.9 HIATAL HERNIA: ICD-10-CM

## 2024-04-04 RX ORDER — OMEPRAZOLE 40 MG/1
40 CAPSULE, DELAYED RELEASE ORAL DAILY
Qty: 90 CAPSULE | Refills: 1 | Status: SHIPPED | OUTPATIENT
Start: 2024-04-04

## 2024-04-09 ENCOUNTER — OFFICE VISIT (OUTPATIENT)
Dept: FAMILY MEDICINE CLINIC | Facility: CLINIC | Age: 54
End: 2024-04-09
Payer: MEDICARE

## 2024-04-09 VITALS
BODY MASS INDEX: 26.46 KG/M2 | OXYGEN SATURATION: 98 % | SYSTOLIC BLOOD PRESSURE: 122 MMHG | DIASTOLIC BLOOD PRESSURE: 73 MMHG | HEART RATE: 79 BPM | HEIGHT: 64 IN | WEIGHT: 155 LBS | RESPIRATION RATE: 18 BRPM

## 2024-04-09 DIAGNOSIS — Z12.5 PROSTATE CANCER SCREENING: ICD-10-CM

## 2024-04-09 DIAGNOSIS — Z00.00 MEDICARE ANNUAL WELLNESS VISIT, SUBSEQUENT: Primary | ICD-10-CM

## 2024-04-09 DIAGNOSIS — F39 MOOD DISORDER: ICD-10-CM

## 2024-04-09 DIAGNOSIS — G40.814 INTRACTABLE LENNOX-GASTAUT SYNDROME WITHOUT STATUS EPILEPTICUS: ICD-10-CM

## 2024-04-09 PROCEDURE — 1170F FXNL STATUS ASSESSED: CPT | Performed by: STUDENT IN AN ORGANIZED HEALTH CARE EDUCATION/TRAINING PROGRAM

## 2024-04-09 PROCEDURE — 1160F RVW MEDS BY RX/DR IN RCRD: CPT | Performed by: STUDENT IN AN ORGANIZED HEALTH CARE EDUCATION/TRAINING PROGRAM

## 2024-04-09 PROCEDURE — G0439 PPPS, SUBSEQ VISIT: HCPCS | Performed by: STUDENT IN AN ORGANIZED HEALTH CARE EDUCATION/TRAINING PROGRAM

## 2024-04-09 PROCEDURE — 1159F MED LIST DOCD IN RCRD: CPT | Performed by: STUDENT IN AN ORGANIZED HEALTH CARE EDUCATION/TRAINING PROGRAM

## 2024-04-09 NOTE — PROGRESS NOTES
The ABCs of the Annual Wellness Visit  Subsequent Medicare Wellness Visit    Subjective    Sreedhar Raines is a 53 y.o. male who presents for a Subsequent Medicare Wellness Visit.    The following portions of the patient's history were reviewed and   updated as appropriate: allergies, current medications, past family history, past medical history, past social history, past surgical history, and problem list.    Compared to one year ago, the patient feels his physical   health is worse.    Compared to one year ago, the patient feels his mental   health is worse.    Recent Hospitalizations:  He was admitted within the past 365 days at [facility].     Current Medical Providers:  Patient Care Team:  Todd Jiménez DO as PCP - General (Family Medicine)    Outpatient Medications Prior to Visit   Medication Sig Dispense Refill    lacosamide (VIMPAT) 50 MG tablet tablet Take 3 tabs twice daily 540 tablet 1    lactulose (CHRONULAC) 10 GM/15ML solution Take 30 mL by mouth 2 (Two) Times a Day As Needed (Constipation). 473 mL 1    lamoTRIgine (LaMICtal) 100 MG tablet TAKE 2.5 TABLETS BY MOUTH TWICE A  tablet 3    levocetirizine (XYZAL) 5 MG tablet TAKE 1 TABLET BY MOUTH EVERY EVENING 90 tablet 1    LORazepam (ATIVAN) 1 MG tablet TAKE 1 TABLET BY MOUTH EVERY 8 HOURS AS NEEDED FOR ANXIETY 90 tablet 0    montelukast (SINGULAIR) 10 MG tablet TAKE 1 TABLET BY MOUTH EVERY NIGHT 90 tablet 1    Nayzilam 5 MG/0.1ML solution       OLANZapine (zyPREXA) 5 MG tablet TAKE 1 TABLET BY MOUTH EVERY NIGHT 90 tablet 1    omeprazole (priLOSEC) 40 MG capsule TAKE 1 CAPSULE BY MOUTH DAILY 90 capsule 1    polyethylene glycol (MIRALAX) 17 GM/SCOOP powder Take 17 g by mouth Daily. (Patient taking differently: Take 17 g by mouth Daily As Needed.) 850 g 1    tamsulosin (FLOMAX) 0.4 MG capsule 24 hr capsule TAKE 1 CAPSULE BY MOUTH DAILY 90 capsule 1    tamsulosin (FLOMAX) 0.4 MG capsule 24 hr capsule TAKE 1 CAPSULE BY MOUTH DAILY  "90 capsule 1    zonisamide (ZONEGRAN) 100 MG capsule TAKE 2 CAPSULES BY MOUTH TWICE DAILY 360 capsule 3    Dilantin 100 MG capsule TAKE 2 CAPSULES BY MOUTH EVERY NIGHT (Patient not taking: Reported on 4/9/2024) 60 capsule 5     No facility-administered medications prior to visit.       No opioid medication identified on active medication list. I have reviewed chart for other potential  high risk medication/s and harmful drug interactions in the elderly.        Aspirin is not on active medication list.  Aspirin use is not indicated based on review of current medical condition/s. Risk of harm outweighs potential benefits.  .    Patient Active Problem List   Diagnosis    Epilepsy    Encounter for interrogation of neurostimulator    Spastic hemiplegic cerebral palsy    Mood disorder    Agitation    Environmental and seasonal allergies    Impairment of cognitive function     Advance Care Planning   Advance Care Planning     Advance Directive is not on file.  ACP discussion was held with the patient during this visit. Patient has an advance directive (not in EMR), copy requested.     Objective    Vitals:    04/09/24 1424   BP: 122/73   Pulse: 79   Resp: 18   SpO2: 98%   Weight: 70.3 kg (155 lb)   Height: 162.6 cm (64\")     Estimated body mass index is 26.61 kg/m² as calculated from the following:    Height as of this encounter: 162.6 cm (64\").    Weight as of this encounter: 70.3 kg (155 lb).    BMI is >= 25 and <30. (Overweight) The following options were offered after discussion;: exercise counseling/recommendations and nutrition counseling/recommendations    Does the patient have evidence of cognitive impairment? Yes        HEALTH RISK ASSESSMENT    Smoking Status:  Social History     Tobacco Use   Smoking Status Never    Passive exposure: Never   Smokeless Tobacco Never     Alcohol Consumption:  Social History     Substance and Sexual Activity   Alcohol Use Never     Fall Risk Screen:    STEADI Fall Risk Assessment " was completed, and patient is at HIGH risk for falls. Assessment completed on:2024    Depression Screenin/9/2024     2:27 PM   PHQ-2/PHQ-9 Depression Screening   Little Interest or Pleasure in Doing Things 0-->not at all   Feeling Down, Depressed or Hopeless 1-->several days   PHQ-9: Brief Depression Severity Measure Score 1       Health Habits and Functional and Cognitive Screenin/9/2024     2:28 PM   Functional & Cognitive Status   Do you have difficulty preparing food and eating? Yes   Do you have difficulty bathing yourself, getting dressed or grooming yourself? No   Do you have difficulty using the toilet? No   Do you have difficulty moving around from place to place? No   Do you have trouble with steps or getting out of a bed or a chair? Yes   Current Diet Limited Junk Food   Dental Exam Up to date   Eye Exam Not up to date   Exercise (times per week) 0 times per week   Current Exercises Include No Regular Exercise   Do you need help using the phone?  No   Are you deaf or do you have serious difficulty hearing?  No   Do you need help to go to places out of walking distance? Yes   Do you need help shopping? Yes   Do you need help preparing meals?  Yes   Do you need help with housework?  Yes   Do you need help with laundry? Yes   Do you need help taking your medications? Yes   Do you need help managing money? Yes   Do you ever drive or ride in a car without wearing a seat belt? No       Age-appropriate Screening Schedule:  Refer to the list below for future screening recommendations based on patient's age, sex and/or medical conditions. Orders for these recommended tests are listed in the plan section. The patient has been provided with a written plan.    Health Maintenance   Topic Date Due    TDAP/TD VACCINES (1 - Tdap) Never done    ZOSTER VACCINE (1 of 2) Never done    LIPID PANEL  2023    COVID-19 Vaccine (2023-24 season) 2023    ANNUAL WELLNESS VISIT  2024     "INFLUENZA VACCINE  08/01/2024    BMI FOLLOWUP  04/09/2025    COLORECTAL CANCER SCREENING  09/13/2026    HEPATITIS C SCREENING  Completed    Pneumococcal Vaccine 0-64  Aged Out                  CMS Preventative Services Quick Reference  Risk Factors Identified During Encounter  Immunizations Discussed/Encouraged: [employer]  The above risks/problems have been discussed with the patient.  Pertinent information has been shared with the patient in the After Visit Summary.  An After Visit Summary and PPPS were made available to the patient.    Follow Up:   Next Medicare Wellness visit to be scheduled in 1 year.       Additional E&M Note during same encounter follows:  Patient has multiple medical problems which are significant and separately identifiable that require additional work above and beyond the Medicare Wellness Visit.      Chief Complaint  Medicare Wellness-subsequent    Subjective        HPI  Neurostimulator put in December, activated January 25, 2024, will see neurology in next few weeks.  Mother with him today feels it has changed his personality.  She feels he is more sleepy and in \"[place] la [place]\".    He continues to have seizures.  She feels he has lost a lot of energy.  Sees neurology later this month.    Urology checks PSA.    Colonoscopy 2021, due in 2026.     Objective   Vital Signs:  /73   Pulse 79   Resp 18   Ht 162.6 cm (64\")   Wt 70.3 kg (155 lb)   SpO2 98%   BMI 26.61 kg/m²     GEN: In no acute distress, non toxic appearing  HEENT: Pupils equal and reactive to light, sclera clear. Mucous membranes moist. Oropharynx without erythema or exudate. No cervical or submandibular lymphadenopathy.  CV: Regular rate and rhythm, no murmurs, 2+ peripheral pulses, No extremity edema.   RESP: Lungs clear to auscultation anteriorly and posteriorly in all lung fields bilaterally.  NEURO: AAO to person, place, and time. CN 2-12 intact grossly.   PSYCH: Affect normal, insight fair    Assessment and Plan "   Diagnoses and all orders for this visit:    1. Medicare annual wellness visit, subsequent (Primary)  Overall reassuring exam.  Blood pressure at goal today.  Continue follow-up with specialist.  Continue current medication regimen.  I did notice delay in his responses as well as staring off into space today so I agree with mom, keep follow-up with neurology.  Check labs as below.  Urology check PSA.  Colonoscopy 2021, repeat recommended in 2026.  Encouraged regular exercise.  Next wellness in 1 year, follow-up 6 months for chronic conditions.  -     Hemoglobin A1c; Future  -     Lipid Panel; Future  -     TSH; Future    2. Mood disorder  Continue olanzapine 5 mg nightly.  If neurology does not feel that the neurostimulator or any of the antiepileptics are causing the new changes we may consider cutting the olanzapine in half.    3. 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    4. Prostate cancer screening  Continue follow-up with urology.      Follow Up   Return in about 6 months (around 10/9/2024) for chronic conditions recheck .  Patient was given instructions and counseling regarding his condition or for health maintenance advice. Please see specific information pulled into the AVS if appropriate.

## 2024-04-10 ENCOUNTER — LAB (OUTPATIENT)
Dept: FAMILY MEDICINE CLINIC | Facility: CLINIC | Age: 54
End: 2024-04-10
Payer: MEDICARE

## 2024-04-10 DIAGNOSIS — Z00.00 MEDICARE ANNUAL WELLNESS VISIT, SUBSEQUENT: ICD-10-CM

## 2024-04-10 LAB
CHOLEST SERPL-MCNC: 196 MG/DL (ref 0–200)
HBA1C MFR BLD: 5.3 % (ref 4.8–5.6)
HDLC SERPL-MCNC: 50 MG/DL (ref 40–60)
LDLC SERPL CALC-MCNC: 123 MG/DL (ref 0–100)
LDLC/HDLC SERPL: 2.41 {RATIO}
TRIGL SERPL-MCNC: 127 MG/DL (ref 0–150)
TSH SERPL DL<=0.05 MIU/L-ACNC: 2.66 UIU/ML (ref 0.27–4.2)
VLDLC SERPL-MCNC: 23 MG/DL (ref 5–40)

## 2024-04-10 PROCEDURE — 83036 HEMOGLOBIN GLYCOSYLATED A1C: CPT | Performed by: STUDENT IN AN ORGANIZED HEALTH CARE EDUCATION/TRAINING PROGRAM

## 2024-04-10 PROCEDURE — 36415 COLL VENOUS BLD VENIPUNCTURE: CPT

## 2024-04-10 PROCEDURE — 80061 LIPID PANEL: CPT | Performed by: STUDENT IN AN ORGANIZED HEALTH CARE EDUCATION/TRAINING PROGRAM

## 2024-04-10 PROCEDURE — 84443 ASSAY THYROID STIM HORMONE: CPT | Performed by: STUDENT IN AN ORGANIZED HEALTH CARE EDUCATION/TRAINING PROGRAM

## 2024-06-16 DIAGNOSIS — J30.89 ENVIRONMENTAL AND SEASONAL ALLERGIES: ICD-10-CM

## 2024-06-16 DIAGNOSIS — R45.1 AGITATION: ICD-10-CM

## 2024-06-16 DIAGNOSIS — F39 MOOD DISORDER: ICD-10-CM

## 2024-06-17 RX ORDER — MONTELUKAST SODIUM 10 MG/1
10 TABLET ORAL NIGHTLY
Qty: 90 TABLET | Refills: 1 | Status: SHIPPED | OUTPATIENT
Start: 2024-06-17

## 2024-06-17 RX ORDER — OLANZAPINE 5 MG/1
5 TABLET ORAL NIGHTLY
Qty: 90 TABLET | Refills: 1 | Status: SHIPPED | OUTPATIENT
Start: 2024-06-17

## 2024-09-09 DIAGNOSIS — J30.89 ENVIRONMENTAL AND SEASONAL ALLERGIES: ICD-10-CM

## 2024-09-09 RX ORDER — LEVOCETIRIZINE DIHYDROCHLORIDE 5 MG/1
5 TABLET, FILM COATED ORAL EVERY EVENING
Qty: 90 TABLET | Refills: 1 | Status: SHIPPED | OUTPATIENT
Start: 2024-09-09

## 2024-10-09 ENCOUNTER — OFFICE VISIT (OUTPATIENT)
Dept: FAMILY MEDICINE CLINIC | Facility: CLINIC | Age: 54
End: 2024-10-09
Payer: MEDICARE

## 2024-10-09 VITALS
BODY MASS INDEX: 26.77 KG/M2 | HEART RATE: 76 BPM | OXYGEN SATURATION: 96 % | SYSTOLIC BLOOD PRESSURE: 119 MMHG | HEIGHT: 64 IN | RESPIRATION RATE: 18 BRPM | WEIGHT: 156.8 LBS | DIASTOLIC BLOOD PRESSURE: 73 MMHG

## 2024-10-09 DIAGNOSIS — K21.9 GASTROESOPHAGEAL REFLUX DISEASE, UNSPECIFIED WHETHER ESOPHAGITIS PRESENT: ICD-10-CM

## 2024-10-09 DIAGNOSIS — G80.2 SPASTIC HEMIPLEGIC CEREBRAL PALSY: ICD-10-CM

## 2024-10-09 DIAGNOSIS — Z80.0 FAMILY HISTORY OF STOMACH CANCER: ICD-10-CM

## 2024-10-09 DIAGNOSIS — F39 MOOD DISORDER: Primary | ICD-10-CM

## 2024-10-09 DIAGNOSIS — R45.1 AGITATION: ICD-10-CM

## 2024-10-09 DIAGNOSIS — Z23 NEED FOR VACCINATION: ICD-10-CM

## 2024-10-09 DIAGNOSIS — H61.23 BILATERAL IMPACTED CERUMEN: ICD-10-CM

## 2024-10-09 DIAGNOSIS — G40.814 INTRACTABLE LENNOX-GASTAUT SYNDROME WITHOUT STATUS EPILEPTICUS: ICD-10-CM

## 2024-10-09 PROCEDURE — G0008 ADMIN INFLUENZA VIRUS VAC: HCPCS | Performed by: STUDENT IN AN ORGANIZED HEALTH CARE EDUCATION/TRAINING PROGRAM

## 2024-10-09 PROCEDURE — 69209 REMOVE IMPACTED EAR WAX UNI: CPT | Performed by: STUDENT IN AN ORGANIZED HEALTH CARE EDUCATION/TRAINING PROGRAM

## 2024-10-09 PROCEDURE — 99214 OFFICE O/P EST MOD 30 MIN: CPT | Performed by: STUDENT IN AN ORGANIZED HEALTH CARE EDUCATION/TRAINING PROGRAM

## 2024-10-09 PROCEDURE — 1159F MED LIST DOCD IN RCRD: CPT | Performed by: STUDENT IN AN ORGANIZED HEALTH CARE EDUCATION/TRAINING PROGRAM

## 2024-10-09 PROCEDURE — 90656 IIV3 VACC NO PRSV 0.5 ML IM: CPT | Performed by: STUDENT IN AN ORGANIZED HEALTH CARE EDUCATION/TRAINING PROGRAM

## 2024-10-09 PROCEDURE — 1160F RVW MEDS BY RX/DR IN RCRD: CPT | Performed by: STUDENT IN AN ORGANIZED HEALTH CARE EDUCATION/TRAINING PROGRAM

## 2024-10-09 RX ORDER — LORAZEPAM 1 MG/1
1 TABLET ORAL DAILY PRN
Qty: 30 TABLET | Refills: 0 | Status: SHIPPED | OUTPATIENT
Start: 2024-10-09

## 2024-10-09 NOTE — PROGRESS NOTES
"Chief Complaint  Chief Complaint   Patient presents with    Follow-up     Subjective        Sreedhar Raines is a 54 y.o. male who presents to [facility].    History of Present Illness  Here for 6 month f/u of chronic conditions.  VNS battery replaced since last visit.     Zyprexa 5 mg nightly for agitation.  Lorazepam 1 mg prn for agitation if breakthrough.    Doing well with above.      He has developed significant GERD, not managed with prilosec 40 mg bid.  Father diagnosed with stomach ca at 53 yo.      Objective   /73   Pulse 76   Resp 18   Ht 162.6 cm (64\")   Wt 71.1 kg (156 lb 12.8 oz)   SpO2 96%   BMI 26.91 kg/m²     Estimated body mass index is 26.91 kg/m² as calculated from the following:    Height as of this encounter: 162.6 cm (64\").    Weight as of this encounter: 71.1 kg (156 lb 12.8 oz).     Physical Exam   GEN: In no acute distress, non toxic appearing  HEENT: Pupils equal and reactive to light, sclera clear. Mucous membranes moist. No cervical or submandibular lymphadenopathy.  R TM obstructed by cerumen.  L external ear canal w/ cerumen, TM visualized and wnl.    CV: Regular rate and rhythm, no murmurs, 2+ peripheral pulses, No extremity edema.   RESP: Lungs clear to auscultation anteriorly and posteriorly in all lung fields bilaterally.     Result Review :              Assessment and Plan     Diagnoses and all orders for this visit:    1. Mood disorder (Primary)  2. Agitation  Continue zyprexa 5 mg nightly w/ prn lorazepam 1 mg.    3. 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  Keep f/u with neurology.      4. Spastic hemiplegic cerebral palsy  Keep f/u with neurology.    Orders:  -     LORazepam (ATIVAN) 1 MG tablet; Take 1 tablet by mouth Daily As Needed for Anxiety.  Dispense: 30 tablet; Refill: 0    5. Bilateral impacted cerumen  Ear Cerumen Removal    Date/Time: 10/9/2024 1:24 PM    Performed by: Todd Jiménez " DO Bryson  Authorized by: Todd Jiménez DO    Anesthesia:  Local Anesthetic: none  Location details: left ear and right ear  Patient tolerance: patient tolerated the procedure well with no immediate complications  Procedure type: irrigation   Sedation:  Patient sedated: no        6. Gastroesophageal reflux disease, unspecified whether esophagitis present  7. Family history of stomach cancer  Significant GERD and family hx of stomach cancer in father at 53 yo.  Refer to GI for screening EGD.    -     Ambulatory Referral to Gastroenterology    8. Need for vaccination  -     Fluzone >6mos ([id number])       Follow Up   Return in about 6 months (around 4/9/2025) for Medicare Wellness.

## 2024-12-05 DIAGNOSIS — R45.1 AGITATION: ICD-10-CM

## 2024-12-05 DIAGNOSIS — K21.9 GASTROESOPHAGEAL REFLUX DISEASE WITHOUT ESOPHAGITIS: ICD-10-CM

## 2024-12-05 DIAGNOSIS — J30.89 ENVIRONMENTAL AND SEASONAL ALLERGIES: ICD-10-CM

## 2024-12-05 DIAGNOSIS — F39 MOOD DISORDER: ICD-10-CM

## 2024-12-05 DIAGNOSIS — K44.9 HIATAL HERNIA: ICD-10-CM

## 2024-12-05 RX ORDER — MONTELUKAST SODIUM 10 MG/1
10 TABLET ORAL NIGHTLY
Qty: 90 TABLET | Refills: 1 | Status: SHIPPED | OUTPATIENT
Start: 2024-12-05

## 2024-12-05 RX ORDER — OLANZAPINE 5 MG/1
5 TABLET ORAL NIGHTLY
Qty: 90 TABLET | Refills: 1 | Status: SHIPPED | OUTPATIENT
Start: 2024-12-05

## 2024-12-05 RX ORDER — OMEPRAZOLE 40 MG/1
40 CAPSULE, DELAYED RELEASE ORAL DAILY
Qty: 90 CAPSULE | Refills: 1 | Status: SHIPPED | OUTPATIENT
Start: 2024-12-05

## 2025-02-20 DIAGNOSIS — G80.2 SPASTIC HEMIPLEGIC CEREBRAL PALSY: ICD-10-CM

## 2025-02-21 RX ORDER — LORAZEPAM 1 MG/1
1 TABLET ORAL DAILY PRN
Qty: 30 TABLET | Refills: 3 | Status: SHIPPED | OUTPATIENT
Start: 2025-02-21

## 2025-03-07 DIAGNOSIS — R45.1 AGITATION: ICD-10-CM

## 2025-03-07 DIAGNOSIS — J30.89 ENVIRONMENTAL AND SEASONAL ALLERGIES: ICD-10-CM

## 2025-03-07 DIAGNOSIS — F39 MOOD DISORDER: ICD-10-CM

## 2025-03-07 RX ORDER — LEVOCETIRIZINE DIHYDROCHLORIDE 5 MG/1
5 TABLET, FILM COATED ORAL EVERY EVENING
Qty: 90 TABLET | Refills: 1 | Status: SHIPPED | OUTPATIENT
Start: 2025-03-07

## 2025-03-07 RX ORDER — OLANZAPINE 5 MG/1
5 TABLET ORAL NIGHTLY
Qty: 90 TABLET | Refills: 1 | Status: SHIPPED | OUTPATIENT
Start: 2025-03-07

## 2025-04-14 ENCOUNTER — OFFICE VISIT (OUTPATIENT)
Dept: FAMILY MEDICINE CLINIC | Facility: CLINIC | Age: 55
End: 2025-04-14
Payer: MEDICARE

## 2025-04-14 VITALS
DIASTOLIC BLOOD PRESSURE: 84 MMHG | BODY MASS INDEX: 26.36 KG/M2 | SYSTOLIC BLOOD PRESSURE: 122 MMHG | HEART RATE: 85 BPM | WEIGHT: 154.4 LBS | HEIGHT: 64 IN | RESPIRATION RATE: 18 BRPM | OXYGEN SATURATION: 97 %

## 2025-04-14 DIAGNOSIS — G40.814 INTRACTABLE LENNOX-GASTAUT SYNDROME WITHOUT STATUS EPILEPTICUS: ICD-10-CM

## 2025-04-14 DIAGNOSIS — R35.1 NOCTURIA: ICD-10-CM

## 2025-04-14 DIAGNOSIS — F39 MOOD DISORDER: ICD-10-CM

## 2025-04-14 DIAGNOSIS — Z00.00 MEDICARE ANNUAL WELLNESS VISIT, SUBSEQUENT: Primary | ICD-10-CM

## 2025-04-14 DIAGNOSIS — G80.2 SPASTIC HEMIPLEGIC CEREBRAL PALSY: ICD-10-CM

## 2025-04-14 PROBLEM — R62.50 UNSPECIFIED LACK OF EXPECTED NORMAL PHYSIOLOGICAL DEVELOPMENT IN CHILDHOOD: Chronic | Status: ACTIVE | Noted: 2023-01-10

## 2025-04-14 PROBLEM — D72.819 LEUKOPENIA: Status: ACTIVE | Noted: 2023-01-10

## 2025-04-14 PROBLEM — G40.219 LOCALIZATION-RELATED (FOCAL) (PARTIAL) SYMPTOMATIC EPILEPSY AND EPILEPTIC SYNDROMES WITH COMPLEX PARTIAL SEIZURES, INTRACTABLE, WITHOUT STATUS EPILEPTICUS: Status: ACTIVE | Noted: 2024-01-26

## 2025-04-14 PROBLEM — Z96.82 NEUROSTIMULATOR DEVICE IN SITU: Status: ACTIVE | Noted: 2024-01-26

## 2025-04-14 PROBLEM — M81.0 OSTEOPOROSIS: Chronic | Status: ACTIVE | Noted: 2023-01-10

## 2025-04-14 PROBLEM — Z97.8 PRESENCE OF OTHER SPECIFIED DEVICES: Status: ACTIVE | Noted: 2024-01-26

## 2025-04-14 PROBLEM — K21.9 GASTROESOPHAGEAL REFLUX DISEASE: Status: ACTIVE | Noted: 2024-04-25

## 2025-04-14 PROBLEM — E55.9 VITAMIN D DEFICIENCY: Chronic | Status: ACTIVE | Noted: 2023-01-11

## 2025-04-14 PROBLEM — G40.812 LENNOX-GASTAUT SYNDROME: Status: ACTIVE | Noted: 2024-04-25

## 2025-04-14 RX ORDER — MIDAZOLAM 5 MG/.1ML
5 SPRAY NASAL DAILY PRN
Qty: 1 EACH | Refills: 1 | Status: CANCELLED | OUTPATIENT
Start: 2025-04-14

## 2025-04-14 RX ORDER — OXYCODONE AND ACETAMINOPHEN 5; 325 MG/1; MG/1
1 TABLET ORAL
COMMUNITY
Start: 2024-09-24

## 2025-04-14 RX ORDER — MIDAZOLAM 5 MG/.1ML
5 SPRAY NASAL DAILY PRN
Qty: 1 EACH | Refills: 1 | Status: SHIPPED | OUTPATIENT
Start: 2025-04-14

## 2025-04-14 RX ORDER — TRAMADOL HYDROCHLORIDE 50 MG/1
50 TABLET ORAL EVERY 6 HOURS PRN
COMMUNITY
Start: 2024-09-24

## 2025-04-14 NOTE — PROGRESS NOTES
Subjective   The ABCs of the Annual Wellness Visit  Medicare Wellness Visit      Sreedhar Raines is a 54 y.o. patient who presents for a Medicare Wellness Visit.    The following portions of the patient's history were reviewed and   updated as appropriate: allergies, current medications, past family history, past medical history, past social history, past surgical history, and problem list.    Compared to one year ago, the patient's physical   health is better.  Compared to one year ago, the patient's mental   health is better.    Recent Hospitalizations:  He was not admitted within the past 365 days at hospital.     Current Medical Providers:  Patient Care Team:  Todd Jiménez DO as PCP - General (Family Medicine)  Chu Olvera MD as Consulting Physician (Neurology)  Ondina Encinas APRN (Nurse Practitioner)    Outpatient Medications Prior to Visit   Medication Sig Dispense Refill    Cholecalciferol 250 MCG (19810 UT) capsule 10,000 Units.      lactulose (CHRONULAC) 10 GM/15ML solution Take 30 mL by mouth 2 (Two) Times a Day As Needed (Constipation). 473 mL 1    lamoTRIgine (LaMICtal) 100 MG tablet TAKE 2.5 TABLETS BY MOUTH TWICE A  tablet 3    levocetirizine (XYZAL) 5 MG tablet TAKE 1 TABLET BY MOUTH EVERY EVENING 90 tablet 1    LORazepam (ATIVAN) 1 MG tablet TAKE 1 TABLET BY MOUTH DAILY AS NEEDED FOR ANXIETY 30 tablet 3    montelukast (SINGULAIR) 10 MG tablet TAKE 1 TABLET BY MOUTH EVERY NIGHT 90 tablet 1    Nayzilam 5 MG/0.1ML solution       OLANZapine (zyPREXA) 5 MG tablet TAKE 1 TABLET BY MOUTH EVERY NIGHT 90 tablet 1    omeprazole (priLOSEC) 40 MG capsule TAKE 1 CAPSULE BY MOUTH DAILY 90 capsule 1    polyethylene glycol (MIRALAX) 17 GM/SCOOP powder Take 17 g by mouth Daily. (Patient taking differently: Take 17 g by mouth Daily As Needed.) 850 g 1    tamsulosin (FLOMAX) 0.4 MG capsule 24 hr capsule TAKE 1 CAPSULE BY MOUTH DAILY 90 capsule 1    traMADol (ULTRAM) 50 MG tablet Take 1 tablet  "by mouth Every 6 (Six) Hours As Needed.      zonisamide (ZONEGRAN) 100 MG capsule TAKE 2 CAPSULES BY MOUTH TWICE DAILY 360 capsule 3    oxyCODONE-acetaminophen (PERCOCET) 5-325 MG per tablet Take 1 tablet by mouth. (Patient not taking: Reported on 4/14/2025)      tamsulosin (FLOMAX) 0.4 MG capsule 24 hr capsule TAKE 1 CAPSULE BY MOUTH DAILY 90 capsule 1     No facility-administered medications prior to visit.     Opioid medication/s are on active medication list.  and I have evaluated his active treatment plan and pain score trends (see table).  Vitals:    04/14/25 1540   PainSc: 0-No pain     I have reviewed the chart for potential of high risk medication and harmful drug interactions in the elderly.        Aspirin is not on active medication list.  Aspirin use is not indicated based on review of current medical condition/s. Risk of harm outweighs potential benefits.  .    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  ACP discussion was held with the patient during this visit. Patient has an advance directive (not in EMR), copy requested.            Objective   Vitals:    04/14/25 1540   BP: 122/84   Pulse: 85   Resp: 18   SpO2: 97%   Weight: 70 kg (154 lb 6.4 oz)   Height: 162.6 cm (64\")   PainSc: 0-No pain       Estimated body mass index is " "26.5 kg/m² as calculated from the following:    Height as of this encounter: 162.6 cm (64\").    Weight as of this encounter: 70 kg (154 lb 6.4 oz).    BMI is >= 25 and <30. (Overweight) The following options were offered after discussion;: exercise counseling/recommendations and nutrition counseling/recommendations           Does the patient have evidence of cognitive impairment? Yes                                                                                                Health  Risk Assessment    Smoking Status:  Social History     Tobacco Use   Smoking Status Never    Passive exposure: Never   Smokeless Tobacco Never     Alcohol Consumption:  Social History     Substance and Sexual Activity   Alcohol Use Never       Fall Risk Screen  STEADI Fall Risk Assessment was completed, and patient is at MODERATE risk for falls. Assessment completed on:2025    Depression Screening   Little interest or pleasure in doing things? Not at all   Feeling down, depressed, or hopeless? Not at all   PHQ-2 Total Score 0      Health Habits and Functional and Cognitive Screenin/14/2025     3:44 PM   Functional & Cognitive Status   Do you have difficulty preparing food and eating? Yes   Do you have difficulty bathing yourself, getting dressed or grooming yourself? No   Do you have difficulty using the toilet? No   Do you have difficulty moving around from place to place? No   Do you have trouble with steps or getting out of a bed or a chair? Yes   Current Diet Well Balanced Diet   Dental Exam Up to date   Eye Exam Up to date   Exercise (times per week) 0 times per week   Current Exercises Include No Regular Exercise   Do you need help using the phone?  No   Are you deaf or do you have serious difficulty hearing?  No   Do you need help to go to places out of walking distance? Yes   Do you need help shopping? Yes   Do you need help preparing meals?  Yes   Do you need help with housework?  Yes   Do you need help with " laundry? Yes   Do you need help taking your medications? Yes   Do you need help managing money? Yes   Do you ever drive or ride in a car without wearing a seat belt? No   Have you felt unusual stress, anger or loneliness in the last month? No   Who do you live with? Other   If you need help, do you have trouble finding someone available to you? No   Have you been bothered in the last four weeks by sexual problems? No   Do you have difficulty concentrating, remembering or making decisions? Yes           Age-appropriate Screening Schedule:  Refer to the list below for future screening recommendations based on patient's age, sex and/or medical conditions. Orders for these recommended tests are listed in the plan section. The patient has been provided with a written plan.    Health Maintenance List  Health Maintenance   Topic Date Due    TDAP/TD VACCINES (1 - Tdap) Never done    Pneumococcal Vaccine 50+ (1 of 1 - PCV) Never done    LIPID PANEL  04/10/2025    COVID-19 Vaccine (4 - 2024-25 season) 04/28/2025 (Originally 9/1/2024)    INFLUENZA VACCINE  07/01/2025    ANNUAL WELLNESS VISIT  04/14/2026    COLORECTAL CANCER SCREENING  09/13/2026    HEPATITIS C SCREENING  Completed    ZOSTER VACCINE  Completed                                                                                                                                                CMS Preventative Services Quick Reference  Risk Factors Identified During Encounter  Immunizations Discussed/Encouraged: Tdap    The above risks/problems have been discussed with the patient.  Pertinent information has been shared with the patient in the After Visit Summary.  An After Visit Summary and PPPS were made available to the patient.    Follow Up:   Next Medicare Wellness visit to be scheduled in 1 year.         Additional E&M Note during same encounter follows:  Patient has additional, significant, and separately identifiable condition(s)/problem(s) that require work  "above and beyond the Medicare Wellness Visit     Chief Complaint  Medicare Wellness-subsequent    Subjective   HPI  ANGI is also being seen today for additional medical problem/s.  Here w/ his mother who helps provide hx.    Needs refill of nazilam prn nasal spray for seizures.  Continues to f/u with neuro for seizure management, now having 2 / month.   Mood is improved w/ nightly zyprexa.    He is having night time urinary incontinence.  Waking up and having to change pajamas.    Has f/u with urology next week.        Objective   Vital Signs:  /84   Pulse 85   Resp 18   Ht 162.6 cm (64\")   Wt 70 kg (154 lb 6.4 oz)   SpO2 97%   BMI 26.50 kg/m²     GEN: In no acute distress, non toxic appearing  HEENT: Pupils equal and reactive to light, sclera clear. Mucous membranes moist. Oropharynx without erythema or exudate. No cervical or submandibular lymphadenopathy.  Bilateral TM's wnl.    CV: Regular rate and rhythm, no murmurs, 2+ peripheral pulses, No extremity edema.   RESP: Lungs clear to auscultation anteriorly and posteriorly in all lung fields bilaterally.  NEURO: AAO to person, place, and time. CN 2-12 intact grossly.   PSYCH: Affect normal, insight fair               Assessment and Plan      Medicare annual wellness visit, subsequent  Overall reassuring exam.  BP at goal today.  Check blood work as below.  Keep f/u with urology for PSA.  Next colonoscopy due in 2026.   Encourage regular exercise.  Encourage healthy diet w/ plenty of fruits, vegetables, water intake.   Next wellness in 1 yr, f/u 6 months for chronic conditions.    Orders:    Comprehensive Metabolic Panel    Hemoglobin A1c    Lipid Panel    TSH    Mood disorder  Continue nightly zyprexa 5 mg w/ prn ativan.         Spastic hemiplegic cerebral palsy  Continue f/u with neuro       Intractable Lennox-Gastaut syndrome without status epilepticus  Continue f/u with neuro       Nocturia  Discuss with urology.  Consider oxybutynin.  Discussed " limiting fluid intake after dinner.  Continue flomax 0.4 mg daily.              Follow Up   Return in about 6 months (around 10/14/2025) for chronic conditions f/u - 30 mins please .  Patient was given instructions and counseling regarding his condition or for health maintenance advice. Please see specific information pulled into the AVS if appropriate.

## 2025-04-17 ENCOUNTER — LAB (OUTPATIENT)
Dept: FAMILY MEDICINE CLINIC | Facility: CLINIC | Age: 55
End: 2025-04-17
Payer: MEDICARE

## 2025-04-17 PROCEDURE — 80061 LIPID PANEL: CPT | Performed by: STUDENT IN AN ORGANIZED HEALTH CARE EDUCATION/TRAINING PROGRAM

## 2025-04-17 PROCEDURE — 80053 COMPREHEN METABOLIC PANEL: CPT | Performed by: STUDENT IN AN ORGANIZED HEALTH CARE EDUCATION/TRAINING PROGRAM

## 2025-04-17 PROCEDURE — 83036 HEMOGLOBIN GLYCOSYLATED A1C: CPT | Performed by: STUDENT IN AN ORGANIZED HEALTH CARE EDUCATION/TRAINING PROGRAM

## 2025-04-17 PROCEDURE — 84443 ASSAY THYROID STIM HORMONE: CPT | Performed by: STUDENT IN AN ORGANIZED HEALTH CARE EDUCATION/TRAINING PROGRAM

## 2025-04-17 PROCEDURE — 36415 COLL VENOUS BLD VENIPUNCTURE: CPT | Performed by: STUDENT IN AN ORGANIZED HEALTH CARE EDUCATION/TRAINING PROGRAM

## 2025-04-18 ENCOUNTER — RESULTS FOLLOW-UP (OUTPATIENT)
Dept: FAMILY MEDICINE CLINIC | Facility: CLINIC | Age: 55
End: 2025-04-18
Payer: MEDICARE

## 2025-04-18 LAB
ALBUMIN SERPL-MCNC: 4.4 G/DL (ref 3.5–5.2)
ALBUMIN/GLOB SERPL: 1.4 G/DL
ALP SERPL-CCNC: 81 U/L (ref 39–117)
ALT SERPL W P-5'-P-CCNC: 24 U/L (ref 1–41)
ANION GAP SERPL CALCULATED.3IONS-SCNC: 9.8 MMOL/L (ref 5–15)
AST SERPL-CCNC: 17 U/L (ref 1–40)
BILIRUB SERPL-MCNC: <0.2 MG/DL (ref 0–1.2)
BUN SERPL-MCNC: 13 MG/DL (ref 6–20)
BUN/CREAT SERPL: 10.3 (ref 7–25)
CALCIUM SPEC-SCNC: 9.5 MG/DL (ref 8.6–10.5)
CHLORIDE SERPL-SCNC: 108 MMOL/L (ref 98–107)
CHOLEST SERPL-MCNC: 208 MG/DL (ref 0–200)
CO2 SERPL-SCNC: 23.2 MMOL/L (ref 22–29)
CREAT SERPL-MCNC: 1.26 MG/DL (ref 0.76–1.27)
EGFRCR SERPLBLD CKD-EPI 2021: 68.2 ML/MIN/1.73
GLOBULIN UR ELPH-MCNC: 3.1 GM/DL
GLUCOSE SERPL-MCNC: 83 MG/DL (ref 65–99)
HBA1C MFR BLD: 5.3 % (ref 4.8–5.6)
HDLC SERPL-MCNC: 45 MG/DL (ref 40–60)
LDLC SERPL CALC-MCNC: 127 MG/DL (ref 0–100)
LDLC/HDLC SERPL: 2.72 {RATIO}
POTASSIUM SERPL-SCNC: 4 MMOL/L (ref 3.5–5.2)
PROT SERPL-MCNC: 7.5 G/DL (ref 6–8.5)
SODIUM SERPL-SCNC: 141 MMOL/L (ref 136–145)
TRIGL SERPL-MCNC: 202 MG/DL (ref 0–150)
TSH SERPL DL<=0.05 MIU/L-ACNC: 1.84 UIU/ML (ref 0.27–4.2)
VLDLC SERPL-MCNC: 36 MG/DL (ref 5–40)

## 2025-04-21 NOTE — PROGRESS NOTES
Hub to share. LM to return call. Please let Kalpesh's mother know his labs looked good: kidney function, liver function, thyroid function and blood sugar all looked good. His cholesterol was slightly elevated so trying to get him some regular exercise along with minimizing fried / greasy / oily foods will be important. Otherwise no changes we need to make at this time.

## 2025-04-21 NOTE — TELEPHONE ENCOUNTER
Name: FRANCISCO J KILGORE    Relationship: Mother    Best Callback Number: [phone]     HUB PROVIDED THE RELAY MESSAGE FROM THE OFFICE   PATIENT VOICED UNDERSTANDING AND HAS NO FURTHER QUESTIONS AT THIS TIME    ADDITIONAL INFORMATION:

## 2025-06-03 DIAGNOSIS — K44.9 HIATAL HERNIA: ICD-10-CM

## 2025-06-03 DIAGNOSIS — J30.89 ENVIRONMENTAL AND SEASONAL ALLERGIES: ICD-10-CM

## 2025-06-03 DIAGNOSIS — N20.0 NEPHROLITHIASIS: ICD-10-CM

## 2025-06-03 DIAGNOSIS — K21.9 GASTROESOPHAGEAL REFLUX DISEASE WITHOUT ESOPHAGITIS: ICD-10-CM

## 2025-06-03 RX ORDER — OMEPRAZOLE 40 MG/1
40 CAPSULE, DELAYED RELEASE ORAL DAILY
Qty: 90 CAPSULE | Refills: 1 | Status: SHIPPED | OUTPATIENT
Start: 2025-06-03

## 2025-06-03 RX ORDER — TAMSULOSIN HYDROCHLORIDE 0.4 MG/1
1 CAPSULE ORAL DAILY
Qty: 90 CAPSULE | Refills: 1 | Status: SHIPPED | OUTPATIENT
Start: 2025-06-03

## 2025-06-03 RX ORDER — MONTELUKAST SODIUM 10 MG/1
10 TABLET ORAL NIGHTLY
Qty: 90 TABLET | Refills: 1 | Status: SHIPPED | OUTPATIENT
Start: 2025-06-03

## (undated) DEVICE — SOL IRRG H2O BG 3000ML STRL

## (undated) DEVICE — CATH URETRL FLXITP POLLACK STD 5F 70CM

## (undated) DEVICE — TRAP WIDEEYE POLYP

## (undated) DEVICE — PAPR PRNT PK SONY W RIBN UPC55

## (undated) DEVICE — PK ENDO GI 50

## (undated) DEVICE — LOU CYSTO: Brand: MEDLINE INDUSTRIES, INC.

## (undated) DEVICE — NITINOL STONE RETRIEVAL BASKET: Brand: ZERO TIP

## (undated) DEVICE — PREP SOL DYNA-HEX CHG LIQ 4% BT 4OZ

## (undated) DEVICE — SYS IRR PUMP SGL ACTN VAC SYR 10CC

## (undated) DEVICE — SYR LUERLOK 20CC BX/50

## (undated) DEVICE — Device

## (undated) DEVICE — GLV SURG SIGNATURE ESSENTIAL PF LTX SZ7

## (undated) DEVICE — PK CYSTO 50

## (undated) DEVICE — KT SURG TURNOVER 050

## (undated) DEVICE — PRT BIOP SEALS

## (undated) DEVICE — SNAR POLYP CAPTIVATOR HEX 27MM 240CM

## (undated) DEVICE — NITINOL WIRE WITH HYDROPHILIC TIP: Brand: SENSOR

## (undated) DEVICE — DUAL LUMEN URETERAL CATHETER

## (undated) DEVICE — GLV SURG SIGNATURE ESSENTIAL PF LTX SZ8.5